# Patient Record
Sex: FEMALE | Race: WHITE | Employment: OTHER | URBAN - METROPOLITAN AREA
[De-identification: names, ages, dates, MRNs, and addresses within clinical notes are randomized per-mention and may not be internally consistent; named-entity substitution may affect disease eponyms.]

---

## 2018-02-26 ENCOUNTER — APPOINTMENT (OUTPATIENT)
Dept: CT IMAGING | Age: 73
DRG: 280 | End: 2018-02-26
Attending: FAMILY MEDICINE
Payer: MEDICARE

## 2018-02-26 ENCOUNTER — APPOINTMENT (OUTPATIENT)
Dept: NUCLEAR MEDICINE | Age: 73
DRG: 280 | End: 2018-02-26
Attending: EMERGENCY MEDICINE
Payer: MEDICARE

## 2018-02-26 ENCOUNTER — APPOINTMENT (OUTPATIENT)
Dept: CARDIAC CATH/INVASIVE PROCEDURES | Age: 73
DRG: 280 | End: 2018-02-26
Payer: MEDICARE

## 2018-02-26 ENCOUNTER — HOSPITAL ENCOUNTER (INPATIENT)
Age: 73
LOS: 3 days | Discharge: HOME | DRG: 280 | End: 2018-03-01
Attending: EMERGENCY MEDICINE | Admitting: FAMILY MEDICINE
Payer: MEDICARE

## 2018-02-26 ENCOUNTER — APPOINTMENT (OUTPATIENT)
Dept: GENERAL RADIOLOGY | Age: 73
DRG: 280 | End: 2018-02-26
Attending: EMERGENCY MEDICINE
Payer: MEDICARE

## 2018-02-26 ENCOUNTER — APPOINTMENT (OUTPATIENT)
Dept: CT IMAGING | Age: 73
DRG: 280 | End: 2018-02-26
Attending: EMERGENCY MEDICINE
Payer: MEDICARE

## 2018-02-26 DIAGNOSIS — I50.9 ACUTE CONGESTIVE HEART FAILURE, UNSPECIFIED CONGESTIVE HEART FAILURE TYPE: Primary | ICD-10-CM

## 2018-02-26 DIAGNOSIS — N28.9 RENAL INSUFFICIENCY: ICD-10-CM

## 2018-02-26 DIAGNOSIS — J96.01 ACUTE RESPIRATORY FAILURE WITH HYPOXIA (HCC): ICD-10-CM

## 2018-02-26 DIAGNOSIS — R77.8 ELEVATED TROPONIN: ICD-10-CM

## 2018-02-26 PROBLEM — R41.82 ALTERED MENTAL STATUS: Status: ACTIVE | Noted: 2018-02-26

## 2018-02-26 LAB
ALBUMIN SERPL-MCNC: 3.4 G/DL (ref 3.5–5)
ALBUMIN/GLOB SERPL: 0.7 {RATIO} (ref 1.1–2.2)
ALP SERPL-CCNC: 138 U/L (ref 45–117)
ALT SERPL-CCNC: 17 U/L (ref 12–78)
AMMONIA PLAS-SCNC: 20 UMOL/L
AMPHET UR QL SCN: NEGATIVE
ANION GAP BLD CALC-SCNC: 14 MMOL/L (ref 5–15)
ANION GAP SERPL CALC-SCNC: 12 MMOL/L (ref 5–15)
APAP SERPL-MCNC: <2 UG/ML (ref 10–30)
APTT PPP: 24.9 SEC (ref 22.1–32)
APTT PPP: 25.6 SEC (ref 22.1–32)
APTT PPP: 37.6 SEC (ref 22.1–32)
ARTERIAL PATENCY WRIST A: YES
AST SERPL-CCNC: 16 U/L (ref 15–37)
ATRIAL RATE: 102 BPM
BARBITURATES UR QL SCN: NEGATIVE
BASE DEFICIT BLD-SCNC: 6 MMOL/L
BASOPHILS # BLD: 0 K/UL (ref 0–0.1)
BASOPHILS # BLD: 0.1 K/UL (ref 0–0.1)
BASOPHILS NFR BLD: 0 % (ref 0–1)
BASOPHILS NFR BLD: 1 % (ref 0–1)
BDY SITE: ABNORMAL
BENZODIAZ UR QL: NEGATIVE
BILIRUB SERPL-MCNC: 0.3 MG/DL (ref 0.2–1)
BNP SERPL-MCNC: 1895 PG/ML (ref 0–125)
BUN BLD-MCNC: 21 MG/DL (ref 9–20)
BUN SERPL-MCNC: 20 MG/DL (ref 6–20)
BUN/CREAT SERPL: 13 (ref 12–20)
CA-I BLD-MCNC: 1.06 MMOL/L (ref 1.12–1.32)
CALCIUM SERPL-MCNC: 8.9 MG/DL (ref 8.5–10.1)
CALCULATED P AXIS, ECG09: 44 DEGREES
CALCULATED R AXIS, ECG10: -6 DEGREES
CALCULATED T AXIS, ECG11: 127 DEGREES
CANNABINOIDS UR QL SCN: NEGATIVE
CHLORIDE BLD-SCNC: 105 MMOL/L (ref 98–107)
CHLORIDE SERPL-SCNC: 104 MMOL/L (ref 97–108)
CHOLEST SERPL-MCNC: 286 MG/DL
CO2 BLD-SCNC: 25 MMOL/L (ref 21–32)
CO2 SERPL-SCNC: 22 MMOL/L (ref 21–32)
COCAINE UR QL SCN: NEGATIVE
COHGB MFR BLD: 1 % (ref 1–2)
CREAT BLD-MCNC: 1.2 MG/DL (ref 0.6–1.3)
CREAT SERPL-MCNC: 1.49 MG/DL (ref 0.55–1.02)
D DIMER PPP FEU-MCNC: 1.86 MG/L FEU (ref 0–0.65)
DIAGNOSIS, 93000: NORMAL
DIFFERENTIAL METHOD BLD: ABNORMAL
DIFFERENTIAL METHOD BLD: ABNORMAL
DRUG SCRN COMMENT,DRGCM: NORMAL
EOSINOPHIL # BLD: 0 K/UL (ref 0–0.4)
EOSINOPHIL # BLD: 0.3 K/UL (ref 0–0.4)
EOSINOPHIL NFR BLD: 0 % (ref 0–7)
EOSINOPHIL NFR BLD: 3 % (ref 0–7)
ERYTHROCYTE [DISTWIDTH] IN BLOOD BY AUTOMATED COUNT: 13.6 % (ref 11.5–14.5)
ERYTHROCYTE [DISTWIDTH] IN BLOOD BY AUTOMATED COUNT: 13.6 % (ref 11.5–14.5)
EST. AVERAGE GLUCOSE BLD GHB EST-MCNC: 137 MG/DL
GAS FLOW.O2 O2 DELIVERY SYS: ABNORMAL L/MIN
GLOBULIN SER CALC-MCNC: 4.8 G/DL (ref 2–4)
GLUCOSE BLD STRIP.AUTO-MCNC: 107 MG/DL (ref 65–100)
GLUCOSE BLD STRIP.AUTO-MCNC: 112 MG/DL (ref 65–100)
GLUCOSE BLD STRIP.AUTO-MCNC: 130 MG/DL (ref 65–100)
GLUCOSE BLD-MCNC: 263 MG/DL (ref 65–100)
GLUCOSE SERPL-MCNC: 263 MG/DL (ref 65–100)
HBA1C MFR BLD: 6.4 % (ref 4.2–6.3)
HCO3 BLD-SCNC: 20.1 MMOL/L (ref 22–26)
HCT VFR BLD AUTO: 36.5 % (ref 35–47)
HCT VFR BLD AUTO: 40.8 % (ref 35–47)
HCT VFR BLD CALC: 37 % (ref 35–47)
HDLC SERPL-MCNC: 76 MG/DL
HDLC SERPL: 3.8 {RATIO} (ref 0–5)
HGB BLD OXIMETRY-MCNC: 12.9 G/DL (ref 14–17)
HGB BLD-MCNC: 12.2 G/DL (ref 11.5–16)
HGB BLD-MCNC: 12.6 GM/DL (ref 11.5–16)
HGB BLD-MCNC: 13.1 G/DL (ref 11.5–16)
IMM GRANULOCYTES # BLD: 0 K/UL (ref 0–0.04)
IMM GRANULOCYTES # BLD: 0 K/UL (ref 0–0.04)
IMM GRANULOCYTES NFR BLD AUTO: 0 % (ref 0–0.5)
IMM GRANULOCYTES NFR BLD AUTO: 0 % (ref 0–0.5)
INR PPP: 1 (ref 0.9–1.1)
LACTATE SERPL-SCNC: 1.3 MMOL/L (ref 0.4–2)
LDLC SERPL CALC-MCNC: 185.4 MG/DL (ref 0–100)
LIPID PROFILE,FLP: ABNORMAL
LYMPHOCYTES # BLD: 1.8 K/UL (ref 0.8–3.5)
LYMPHOCYTES # BLD: 4.4 K/UL (ref 0.8–3.5)
LYMPHOCYTES NFR BLD: 17 % (ref 12–49)
LYMPHOCYTES NFR BLD: 43 % (ref 12–49)
MAGNESIUM SERPL-MCNC: 2 MG/DL (ref 1.6–2.4)
MCH RBC QN AUTO: 31.7 PG (ref 26–34)
MCH RBC QN AUTO: 31.9 PG (ref 26–34)
MCHC RBC AUTO-ENTMCNC: 32.1 G/DL (ref 30–36.5)
MCHC RBC AUTO-ENTMCNC: 33.4 G/DL (ref 30–36.5)
MCV RBC AUTO: 95.5 FL (ref 80–99)
MCV RBC AUTO: 98.8 FL (ref 80–99)
METHADONE UR QL: NEGATIVE
METHGB MFR BLD: 0.4 % (ref 0–1.4)
MONOCYTES # BLD: 0.5 K/UL (ref 0–1)
MONOCYTES # BLD: 0.6 K/UL (ref 0–1)
MONOCYTES NFR BLD: 5 % (ref 5–13)
MONOCYTES NFR BLD: 6 % (ref 5–13)
NEUTS SEG # BLD: 4.8 K/UL (ref 1.8–8)
NEUTS SEG # BLD: 7.9 K/UL (ref 1.8–8)
NEUTS SEG NFR BLD: 48 % (ref 32–75)
NEUTS SEG NFR BLD: 76 % (ref 32–75)
NRBC # BLD: 0 K/UL (ref 0–0.01)
NRBC # BLD: 0 K/UL (ref 0–0.01)
NRBC BLD-RTO: 0 PER 100 WBC
NRBC BLD-RTO: 0 PER 100 WBC
O2/TOTAL GAS SETTING VFR VENT: 40 %
OPIATES UR QL: NEGATIVE
OXYHGB MFR BLD: 81.2 % (ref 94–97)
P-R INTERVAL, ECG05: 152 MS
PCO2 BLD: 38.5 MMHG (ref 35–45)
PCP UR QL: NEGATIVE
PEEP RESPIRATORY: 6 CMH2O
PH BLD: 7.33 [PH] (ref 7.35–7.45)
PIP ISTAT,IPIP: 15
PLATELET # BLD AUTO: 317 K/UL (ref 150–400)
PLATELET # BLD AUTO: 332 K/UL (ref 150–400)
PMV BLD AUTO: 11 FL (ref 8.9–12.9)
PMV BLD AUTO: 11.1 FL (ref 8.9–12.9)
PO2 BLD: 65 MMHG (ref 80–100)
POTASSIUM BLD-SCNC: 4.5 MMOL/L (ref 3.5–5.1)
POTASSIUM SERPL-SCNC: 3.4 MMOL/L (ref 3.5–5.1)
PRESSURE SUPPORT SETTING VENT: 8 CMH2O
PROT SERPL-MCNC: 8.2 G/DL (ref 6.4–8.2)
PROTHROMBIN TIME: 10.4 SEC (ref 9–11.1)
Q-T INTERVAL, ECG07: 408 MS
QRS DURATION, ECG06: 156 MS
QTC CALCULATION (BEZET), ECG08: 531 MS
RBC # BLD AUTO: 3.82 M/UL (ref 3.8–5.2)
RBC # BLD AUTO: 4.13 M/UL (ref 3.8–5.2)
SALICYLATES SERPL-MCNC: <1.7 MG/DL (ref 2.8–20)
SAO2 % BLD: 82 % (ref 95–99)
SAO2 % BLD: 91 % (ref 92–97)
SERVICE CMNT-IMP: ABNORMAL
SODIUM BLD-SCNC: 139 MMOL/L (ref 136–145)
SODIUM SERPL-SCNC: 138 MMOL/L (ref 136–145)
SPECIMEN TYPE: ABNORMAL
SPONTANEOUS TIMED, IST: YES
T4 SERPL-MCNC: 9.8 UG/DL (ref 4.8–13.9)
THERAPEUTIC RANGE,PTTT: ABNORMAL SECS (ref 58–77)
THERAPEUTIC RANGE,PTTT: NORMAL SECS (ref 58–77)
THERAPEUTIC RANGE,PTTT: NORMAL SECS (ref 58–77)
TOTAL RESP. RATE, ITRR: 35
TRIGL SERPL-MCNC: 123 MG/DL (ref ?–150)
TROPONIN I SERPL-MCNC: 0.1 NG/ML
TROPONIN I SERPL-MCNC: 1.28 NG/ML
TROPONIN I SERPL-MCNC: 2.46 NG/ML
TSH SERPL DL<=0.05 MIU/L-ACNC: 5.36 UIU/ML (ref 0.36–3.74)
VENTRICULAR RATE, ECG03: 102 BPM
VLDLC SERPL CALC-MCNC: 24.6 MG/DL
WBC # BLD AUTO: 10.1 K/UL (ref 3.6–11)
WBC # BLD AUTO: 10.3 K/UL (ref 3.6–11)

## 2018-02-26 PROCEDURE — 74011250637 HC RX REV CODE- 250/637: Performed by: NURSE PRACTITIONER

## 2018-02-26 PROCEDURE — 82375 ASSAY CARBOXYHB QUANT: CPT | Performed by: FAMILY MEDICINE

## 2018-02-26 PROCEDURE — 85379 FIBRIN DEGRADATION QUANT: CPT | Performed by: EMERGENCY MEDICINE

## 2018-02-26 PROCEDURE — 94660 CPAP INITIATION&MGMT: CPT

## 2018-02-26 PROCEDURE — 74011250637 HC RX REV CODE- 250/637: Performed by: EMERGENCY MEDICINE

## 2018-02-26 PROCEDURE — 84443 ASSAY THYROID STIM HORMONE: CPT | Performed by: FAMILY MEDICINE

## 2018-02-26 PROCEDURE — 83735 ASSAY OF MAGNESIUM: CPT | Performed by: FAMILY MEDICINE

## 2018-02-26 PROCEDURE — 83605 ASSAY OF LACTIC ACID: CPT | Performed by: FAMILY MEDICINE

## 2018-02-26 PROCEDURE — 99283 EMERGENCY DEPT VISIT LOW MDM: CPT

## 2018-02-26 PROCEDURE — 74011250637 HC RX REV CODE- 250/637: Performed by: FAMILY MEDICINE

## 2018-02-26 PROCEDURE — 51702 INSERT TEMP BLADDER CATH: CPT

## 2018-02-26 PROCEDURE — 93970 EXTREMITY STUDY: CPT

## 2018-02-26 PROCEDURE — 93005 ELECTROCARDIOGRAM TRACING: CPT

## 2018-02-26 PROCEDURE — 80061 LIPID PANEL: CPT | Performed by: FAMILY MEDICINE

## 2018-02-26 PROCEDURE — 80307 DRUG TEST PRSMV CHEM ANLYZR: CPT | Performed by: FAMILY MEDICINE

## 2018-02-26 PROCEDURE — 74011000250 HC RX REV CODE- 250: Performed by: EMERGENCY MEDICINE

## 2018-02-26 PROCEDURE — 96375 TX/PRO/DX INJ NEW DRUG ADDON: CPT

## 2018-02-26 PROCEDURE — 80047 BASIC METABLC PNL IONIZED CA: CPT

## 2018-02-26 PROCEDURE — 71045 X-RAY EXAM CHEST 1 VIEW: CPT

## 2018-02-26 PROCEDURE — 80307 DRUG TEST PRSMV CHEM ANLYZR: CPT | Performed by: EMERGENCY MEDICINE

## 2018-02-26 PROCEDURE — 85025 COMPLETE CBC W/AUTO DIFF WBC: CPT | Performed by: EMERGENCY MEDICINE

## 2018-02-26 PROCEDURE — 96374 THER/PROPH/DIAG INJ IV PUSH: CPT

## 2018-02-26 PROCEDURE — 74011250636 HC RX REV CODE- 250/636: Performed by: INTERNAL MEDICINE

## 2018-02-26 PROCEDURE — 85730 THROMBOPLASTIN TIME PARTIAL: CPT | Performed by: NURSE PRACTITIONER

## 2018-02-26 PROCEDURE — 74011250636 HC RX REV CODE- 250/636: Performed by: NURSE PRACTITIONER

## 2018-02-26 PROCEDURE — 82962 GLUCOSE BLOOD TEST: CPT

## 2018-02-26 PROCEDURE — A9540 TC99M MAA: HCPCS

## 2018-02-26 PROCEDURE — 83880 ASSAY OF NATRIURETIC PEPTIDE: CPT | Performed by: EMERGENCY MEDICINE

## 2018-02-26 PROCEDURE — 70450 CT HEAD/BRAIN W/O DYE: CPT

## 2018-02-26 PROCEDURE — 82140 ASSAY OF AMMONIA: CPT | Performed by: FAMILY MEDICINE

## 2018-02-26 PROCEDURE — 74011250636 HC RX REV CODE- 250/636: Performed by: EMERGENCY MEDICINE

## 2018-02-26 PROCEDURE — 74011250636 HC RX REV CODE- 250/636: Performed by: SPECIALIST

## 2018-02-26 PROCEDURE — 80053 COMPREHEN METABOLIC PANEL: CPT | Performed by: EMERGENCY MEDICINE

## 2018-02-26 PROCEDURE — 85610 PROTHROMBIN TIME: CPT | Performed by: EMERGENCY MEDICINE

## 2018-02-26 PROCEDURE — 93306 TTE W/DOPPLER COMPLETE: CPT

## 2018-02-26 PROCEDURE — 65660000000 HC RM CCU STEPDOWN

## 2018-02-26 PROCEDURE — 77030005534 HC CATH URETH FOL TEMP SENS SIMS -B

## 2018-02-26 PROCEDURE — 84436 ASSAY OF TOTAL THYROXINE: CPT | Performed by: FAMILY MEDICINE

## 2018-02-26 PROCEDURE — 82803 BLOOD GASES ANY COMBINATION: CPT

## 2018-02-26 PROCEDURE — 77030013033 HC MSK BPAP/CPAP MMKA -B

## 2018-02-26 PROCEDURE — 36415 COLL VENOUS BLD VENIPUNCTURE: CPT | Performed by: NURSE PRACTITIONER

## 2018-02-26 PROCEDURE — 36600 WITHDRAWAL OF ARTERIAL BLOOD: CPT

## 2018-02-26 PROCEDURE — 84484 ASSAY OF TROPONIN QUANT: CPT | Performed by: EMERGENCY MEDICINE

## 2018-02-26 PROCEDURE — 85730 THROMBOPLASTIN TIME PARTIAL: CPT | Performed by: EMERGENCY MEDICINE

## 2018-02-26 PROCEDURE — 83036 HEMOGLOBIN GLYCOSYLATED A1C: CPT | Performed by: EMERGENCY MEDICINE

## 2018-02-26 RX ORDER — SODIUM CHLORIDE 0.9 % (FLUSH) 0.9 %
5-10 SYRINGE (ML) INJECTION AS NEEDED
Status: DISCONTINUED | OUTPATIENT
Start: 2018-02-26 | End: 2018-03-01 | Stop reason: HOSPADM

## 2018-02-26 RX ORDER — INSULIN LISPRO 100 [IU]/ML
INJECTION, SOLUTION INTRAVENOUS; SUBCUTANEOUS
Status: DISCONTINUED | OUTPATIENT
Start: 2018-02-26 | End: 2018-03-01 | Stop reason: HOSPADM

## 2018-02-26 RX ORDER — PRAVASTATIN SODIUM 20 MG/1
40 TABLET ORAL
Status: DISCONTINUED | OUTPATIENT
Start: 2018-02-26 | End: 2018-03-01 | Stop reason: HOSPADM

## 2018-02-26 RX ORDER — DEXTROSE 50 % IN WATER (D50W) INTRAVENOUS SYRINGE
12.5-25 AS NEEDED
Status: DISCONTINUED | OUTPATIENT
Start: 2018-02-26 | End: 2018-03-01 | Stop reason: HOSPADM

## 2018-02-26 RX ORDER — FENTANYL CITRATE 50 UG/ML
25-200 INJECTION, SOLUTION INTRAMUSCULAR; INTRAVENOUS
Status: DISCONTINUED | OUTPATIENT
Start: 2018-02-26 | End: 2018-02-26 | Stop reason: HOSPADM

## 2018-02-26 RX ORDER — SODIUM CHLORIDE 9 MG/ML
75 INJECTION, SOLUTION INTRAVENOUS CONTINUOUS
Status: DISCONTINUED | OUTPATIENT
Start: 2018-02-26 | End: 2018-02-26

## 2018-02-26 RX ORDER — MAGNESIUM SULFATE 100 %
4 CRYSTALS MISCELLANEOUS AS NEEDED
Status: DISCONTINUED | OUTPATIENT
Start: 2018-02-26 | End: 2018-03-01 | Stop reason: HOSPADM

## 2018-02-26 RX ORDER — METOPROLOL TARTRATE 25 MG/1
25 TABLET, FILM COATED ORAL EVERY 12 HOURS
Status: DISCONTINUED | OUTPATIENT
Start: 2018-02-26 | End: 2018-02-27

## 2018-02-26 RX ORDER — NITROGLYCERIN 0.4 MG/1
0.4 TABLET SUBLINGUAL
Status: DISCONTINUED | OUTPATIENT
Start: 2018-02-26 | End: 2018-02-26 | Stop reason: HOSPADM

## 2018-02-26 RX ORDER — SODIUM CHLORIDE 0.9 % (FLUSH) 0.9 %
5-10 SYRINGE (ML) INJECTION EVERY 8 HOURS
Status: DISCONTINUED | OUTPATIENT
Start: 2018-02-26 | End: 2018-03-01 | Stop reason: HOSPADM

## 2018-02-26 RX ORDER — POTASSIUM CHLORIDE 750 MG/1
40 TABLET, FILM COATED, EXTENDED RELEASE ORAL
Status: COMPLETED | OUTPATIENT
Start: 2018-02-26 | End: 2018-02-26

## 2018-02-26 RX ORDER — GUAIFENESIN 100 MG/5ML
162 LIQUID (ML) ORAL
Status: COMPLETED | OUTPATIENT
Start: 2018-02-26 | End: 2018-02-26

## 2018-02-26 RX ORDER — HEPARIN SODIUM 5000 [USP'U]/ML
4000 INJECTION, SOLUTION INTRAVENOUS; SUBCUTANEOUS ONCE
Status: COMPLETED | OUTPATIENT
Start: 2018-02-27 | End: 2018-02-27

## 2018-02-26 RX ORDER — SODIUM CHLORIDE 0.9 % (FLUSH) 0.9 %
10 SYRINGE (ML) INJECTION AS NEEDED
Status: DISCONTINUED | OUTPATIENT
Start: 2018-02-26 | End: 2018-02-26 | Stop reason: HOSPADM

## 2018-02-26 RX ORDER — FUROSEMIDE 10 MG/ML
40 INJECTION INTRAMUSCULAR; INTRAVENOUS
Status: COMPLETED | OUTPATIENT
Start: 2018-02-26 | End: 2018-02-26

## 2018-02-26 RX ORDER — HEPARIN SODIUM 10000 [USP'U]/100ML
11-25 INJECTION, SOLUTION INTRAVENOUS
Status: DISCONTINUED | OUTPATIENT
Start: 2018-02-26 | End: 2018-02-27

## 2018-02-26 RX ORDER — MIDAZOLAM HYDROCHLORIDE 1 MG/ML
.5-1 INJECTION, SOLUTION INTRAMUSCULAR; INTRAVENOUS
Status: DISCONTINUED | OUTPATIENT
Start: 2018-02-26 | End: 2018-02-26 | Stop reason: HOSPADM

## 2018-02-26 RX ORDER — HEPARIN SODIUM 200 [USP'U]/100ML
1000 INJECTION, SOLUTION INTRAVENOUS CONTINUOUS
Status: DISCONTINUED | OUTPATIENT
Start: 2018-02-26 | End: 2018-02-26

## 2018-02-26 RX ORDER — SODIUM CHLORIDE 9 MG/ML
1.5 INJECTION, SOLUTION INTRAVENOUS CONTINUOUS
Status: DISCONTINUED | OUTPATIENT
Start: 2018-02-26 | End: 2018-02-26

## 2018-02-26 RX ORDER — NITROGLYCERIN 20 MG/100ML
0-20 INJECTION INTRAVENOUS
Status: DISCONTINUED | OUTPATIENT
Start: 2018-02-26 | End: 2018-02-26

## 2018-02-26 RX ORDER — SODIUM CHLORIDE 9 MG/ML
3 INJECTION, SOLUTION INTRAVENOUS CONTINUOUS
Status: DISCONTINUED | OUTPATIENT
Start: 2018-02-26 | End: 2018-02-26

## 2018-02-26 RX ORDER — ASPIRIN 81 MG/1
81 TABLET ORAL DAILY
Status: DISCONTINUED | OUTPATIENT
Start: 2018-02-27 | End: 2018-03-01 | Stop reason: HOSPADM

## 2018-02-26 RX ORDER — FUROSEMIDE 10 MG/ML
40 INJECTION INTRAMUSCULAR; INTRAVENOUS 2 TIMES DAILY
Status: DISCONTINUED | OUTPATIENT
Start: 2018-02-26 | End: 2018-03-01

## 2018-02-26 RX ORDER — ATROPINE SULFATE 0.1 MG/ML
1 INJECTION INTRAVENOUS AS NEEDED
Status: DISCONTINUED | OUTPATIENT
Start: 2018-02-26 | End: 2018-02-26 | Stop reason: HOSPADM

## 2018-02-26 RX ORDER — LIDOCAINE HYDROCHLORIDE 10 MG/ML
10-30 INJECTION INFILTRATION; PERINEURAL
Status: DISCONTINUED | OUTPATIENT
Start: 2018-02-26 | End: 2018-02-26 | Stop reason: HOSPADM

## 2018-02-26 RX ORDER — HEPARIN SODIUM 1000 [USP'U]/ML
1000-5000 INJECTION, SOLUTION INTRAVENOUS; SUBCUTANEOUS
Status: DISCONTINUED | OUTPATIENT
Start: 2018-02-26 | End: 2018-02-26 | Stop reason: HOSPADM

## 2018-02-26 RX ADMIN — HEPARIN SODIUM AND DEXTROSE 11 UNITS/KG/HR: 10000; 5 INJECTION INTRAVENOUS at 14:18

## 2018-02-26 RX ADMIN — Medication 10 ML: at 07:22

## 2018-02-26 RX ADMIN — NITROGLYCERIN 10 MCG/MIN: 20 INJECTION INTRAVENOUS at 01:55

## 2018-02-26 RX ADMIN — PRAVASTATIN SODIUM 40 MG: 40 TABLET ORAL at 21:30

## 2018-02-26 RX ADMIN — FUROSEMIDE 40 MG: 10 INJECTION, SOLUTION INTRAMUSCULAR; INTRAVENOUS at 21:37

## 2018-02-26 RX ADMIN — METOPROLOL TARTRATE 25 MG: 25 TABLET ORAL at 10:14

## 2018-02-26 RX ADMIN — SODIUM CHLORIDE 75 ML/HR: 900 INJECTION, SOLUTION INTRAVENOUS at 10:11

## 2018-02-26 RX ADMIN — ASPIRIN 81 MG 162 MG: 81 TABLET ORAL at 07:21

## 2018-02-26 RX ADMIN — FUROSEMIDE 40 MG: 10 INJECTION, SOLUTION INTRAMUSCULAR; INTRAVENOUS at 14:28

## 2018-02-26 RX ADMIN — METOPROLOL TARTRATE 25 MG: 25 TABLET ORAL at 21:31

## 2018-02-26 RX ADMIN — FUROSEMIDE 40 MG: 10 INJECTION, SOLUTION INTRAMUSCULAR; INTRAVENOUS at 01:31

## 2018-02-26 RX ADMIN — POTASSIUM CHLORIDE 40 MEQ: 750 TABLET, EXTENDED RELEASE ORAL at 06:12

## 2018-02-26 RX ADMIN — SODIUM CHLORIDE 3 ML/KG/HR: 900 INJECTION, SOLUTION INTRAVENOUS at 09:55

## 2018-02-26 NOTE — CDMP QUERY
The medical record has dx. Of Altered Mental Status, Can you please clarify if this patient is (was) being treated/managed for:     =>  Acute Metabolic encephalopathy in the setting of Acute Resp. Failure, Metabolic Acidosis & NSTEMi, requiring Close monitoring, CT of head, Bipap, Labs, IV Nitro  => Other explanation of clinical findings  => Unable to determine (no explanation for clinical findings)    The medical record reflects the following clinical findings, treatment, and risk factors. Risk Factors:  NSTEMI, Resp. Failure, Metabolic Acidosis  Clinical Indicators:  Upon arrival to ER pt initally only responsive to stimulit, and gripping hands, tx with Bipap and later notes documents \"Pt much more alert at this time\"  Treatment: IV Nitro, BIpap, Ct head, Labs,     Please clarify and document your clinical opinion in the progress notes and discharge summary including the definitive and/or presumptive diagnosis, (suspected or probable), related to the above clinical findings. Please include clinical findings supporting your diagnosis.       Thank you,  Chip Nuha, BSN, RN, [address]  [phone]

## 2018-02-26 NOTE — PROGRESS NOTES
Stopped by to complete six minute walk with patient but patient was down in Cath Lab. Will check back later.

## 2018-02-26 NOTE — IP AVS SNAPSHOT
[address] [facility] [address] [place] [address] 
[phone] Patient: Te Fragoso MRN: [id number] WLA:[birth year]8 A check luke indicates which time of day the medication should be taken. My Medications START taking these medications Instructions Each Dose to Equal  
 Morning Noon Evening Bedtime  
 aspirin delayed-release 81 mg tablet Start taking on:  3/2/2018 Your last dose was: Your next dose is: Take 1 Tab by mouth daily. 81 mg  
    
   
   
   
  
 furosemide 40 mg tablet Commonly known as:  LASIX Your last dose was: Your next dose is: Take 1 Tab by mouth two (2) times a day. Indications: Pulmonary Edema due to Chronic Heart Failure 40 mg  
    
   
   
   
  
 losartan 25 mg tablet Commonly known as:  COZAAR Start taking on:  3/2/2018 Your last dose was: Your next dose is: Take 0.5 Tabs by mouth daily. 12.5 mg  
    
   
   
   
  
 metoprolol succinate 25 mg XL tablet Commonly known as:  TOPROL-XL Start taking on:  3/2/2018 Your last dose was: Your next dose is: Take 1 Tab by mouth daily for 30 days. 25 mg  
    
   
   
   
  
 potassium chloride SR 20 mEq tablet Commonly known as:  K-TAB Your last dose was: Your next dose is: Take 1 Tab by mouth daily. Indications: hypokalemia prevention 20 mEq  
    
   
   
   
  
 pravastatin 40 mg tablet Commonly known as:  PRAVACHOL Your last dose was: Your next dose is: Take 1 Tab by mouth nightly for 30 days. Indications: mixed hyperlipidemia 40 mg Where to Get Your Medications Information on where to get these meds will be given to you by the nurse or doctor. ! Ask your nurse or doctor about these medications  
  aspirin delayed-release 81 mg tablet furosemide 40 mg tablet  
 losartan 25 mg tablet  
 metoprolol succinate 25 mg XL tablet  
 potassium chloride SR 20 mEq tablet  
 pravastatin 40 mg tablet

## 2018-02-26 NOTE — PROGRESS NOTES
Hospitalist Progress Note  Ellie Oro NP  Answering service: [phone] from in house phone  Cell: [phone]      Date of Service:  2018  NAME:  Leonidas Raya  :    MRN:  [id number]      Admission Summary:   18-year-old white female with no significant past medical history presented to the Emergency Department via EMS from home with reported acute respiratory distress, altered mental status. Patient reports that she became acutely short of breath, which was severe, constant without specific alleviating factors. She notes that she has not seen a \"doctor\" in 9 years since the death of her . Troponin up to 2.46. Echo showing EF of 15-20%. Interval history / Subjective:     Patient resting in bed. Reports breathing is much better now. No chest pain. BLE edema improved. Cardiology unable to do cath today d/t volume overload.  Plan is to diuresis today and do cath tomorrow afternoon at 3:45 pm.      Assessment & Plan:     Acute Hypoxic respiratory failure   -requiring bipap on admission, currently on O2 via nasal cannula  -likely from volume overload.   -monitor o2 sat     Acute Systolic CHF NYHA class  -echo showing EF 15-20%, elevated BNP  -started on BB  -continue IV diuretic   -hold on ACE for now d/t GARCIA   -cardiology following   -strict I&O, HF educator    GARCIA   -unsure of baseline likely from uncontrolled HTN   -avoid nephrotoxic meds   -repeat labs in am     Uncontrolled HTN   -started on BB and diuretic   -monitor, may need further adjustments     NSTEMI   -troponin up to 2.46, EKG no acute changes  -cardiology following   -BB, heparin drip, statin  -cath tomorrow     Type 2 DM- new dx   -hgb a1c 6.4  -ssi, accuchecks   -DTC consult      Hypokalemia   -replenish  -monitor    Hypercholesterolemia   -CHol and LDL elevated   -started on statin    Elevated TSH   -tsh elevated, t4 normal  -will need labs repeats with a pcp in 4-6 weeks     Code status: Full  DVT prophylaxis: heparin    Care Plan discussed with: Patient/Family and Nurse  Disposition: Home w/Family and TBD     Hospital Problems  Date Reviewed: 2/26/2018          Codes Class Noted POA    Altered mental status ICD-10-CM: R41.82  ICD-9-CM: 780.97  2/26/2018 Unknown        Elevated troponin ICD-10-CM: R74.8  ICD-9-CM: 790.6  2/26/2018 Unknown        * (Principal)Acute respiratory failure with hypoxia [facility]) ICD-10-CM: J96.01  ICD-9-CM: 518.81  2/26/2018 Unknown                Review of Systems:   A comprehensive review of systems was negative except for that written in the HPI. Vital Signs:    Last 24hrs VS reviewed since prior progress note. Most recent are:  Visit Vitals    /80 (BP 1 Location: Left arm)    Pulse 75    Temp 98.5 °F (36.9 °C)    Resp 20    Wt 90.6 kg (199 lb 11.2 oz)    SpO2 96%    Breastfeeding No         Intake/Output Summary (Last 24 hours) at 02/26/18 1606  Last data filed at 02/26/18 1601   Gross per 24 hour   Intake                0 ml   Output             3925 ml   Net            -3925 ml        Physical Examination:             Constitutional:  No acute distress, cooperative, pleasant    ENT:  Oral mucous moist, oropharynx benign. Resp:  CTA bilaterally. No wheezing/rhonchi/rales. Crackles in BLL. No accessory muscle use. O2 via NC   CV:  Regular rhythm, normal rate, no murmurs, gallops, rubs    GI:  Soft, non distended, non tender. normoactive bowel sounds,      Musculoskeletal:  trace BLE edema, warm, 2+ pulses throughout    Neurologic:  Moves all extremities. AAOx3, CN II-XII reviewed     Psych:  Good insight, Not anxious nor agitated.    Lines: gomez catheter       Data Review:    Review and/or order of clinical lab test  Review and/or order of tests in the radiology section of CPT  Review and/or order of tests in the medicine section of CPT      Labs:     Recent Labs      02/26/18   1259  02/26/18   0127   WBC 10.3  10.1   HGB  12.2  13.1   HCT  36.5  40.8   PLT  317  332     Recent Labs      02/26/18   0613  02/26/18 0127   NA   --   138   K   --   3.4*   CL   --   104   CO2   --   22   BUN   --   20   CREA   --   1.49*   GLU   --   263*   CA   --   8.9   MG  2.0   --      Recent Labs      02/26/18 0127   SGOT  16   ALT  17   AP  138*   TBILI  0.3   TP  8.2   ALB  3.4*   GLOB  4.8*     Recent Labs      02/26/18   1259  02/26/18 0127   INR   --   1.0   PTP   --   10.4   APTT  25.6  24.9      No results for input(s): FE, TIBC, PSAT, FERR in the last 72 hours. No results found for: FOL, RBCF   No results for input(s): PH, PCO2, PO2 in the last 72 hours.   Recent Labs      02/26/18   1259  02/26/18 0613 02/26/18 0127   TROIQ  2.46*  1.28*  0.10*     Lab Results   Component Value Date/Time    Cholesterol, total 286 (H) 02/26/2018 01:27 AM    HDL Cholesterol 76 02/26/2018 01:27 AM    LDL, calculated 185.4 (H) 02/26/2018 01:27 AM    Triglyceride 123 02/26/2018 01:27 AM    CHOL/HDL Ratio 3.8 02/26/2018 01:27 AM     Lab Results   Component Value Date/Time    Glucose (POC) 112 (H) 02/26/2018 07:25 AM    Glucose (POC) 263 (H) 02/26/2018 01:50 AM     No results found for: COLOR, APPRN, SPGRU, REFSG, KAZ, PROTU, GLUCU, KETU, BILU, UROU, ROMEL, LEUKU, GLUKE, EPSU, BACTU, WBCU, RBCU, CASTS, UCRY      Medications Reviewed:     Current Facility-Administered Medications   Medication Dose Route Frequency    sodium chloride (NS) flush 5-10 mL  5-10 mL IntraVENous Q8H    sodium chloride (NS) flush 5-10 mL  5-10 mL IntraVENous PRN    glucose chewable tablet 16 g  4 Tab Oral PRN    dextrose (D50W) injection syrg 12.5-25 g  12.5-25 g IntraVENous PRN    glucagon (GLUCAGEN) injection 1 mg  1 mg IntraMUSCular PRN    insulin lispro (HUMALOG) injection   SubCUTAneous AC&HS    pravastatin (PRAVACHOL) tablet 40 mg  40 mg Oral QHS    [START ON 2/27/2018] aspirin delayed-release tablet 81 mg  81 mg Oral DAILY    metoprolol tartrate (LOPRESSOR) tablet 25 mg  25 mg Oral Q12H    furosemide (LASIX) injection 40 mg  40 mg IntraVENous BID    heparin 25,000 units in D5W 250 ml infusion  11-25 Units/kg/hr IntraVENous TITRATE     ______________________________________________________________________  EXPECTED LENGTH OF STAY: - - -  ACTUAL LENGTH OF STAY:          0                 Nieves Mcgowan NP

## 2018-02-26 NOTE — PROCEDURES
[facility]  *** FINAL REPORT ***    Name: Yue Teran  MRN: [id number]    Inpatient  : 16 Aug 1945  HIS Order #: [id number]  [id number] [facility] Visit #: [id number]  Date: 2018    TYPE OF TEST: Peripheral Venous Testing    REASON FOR TEST  Edema    Right Leg:-  Deep venous thrombosis:           No  Superficial venous thrombosis:    No  Deep venous insufficiency:        Not examined  Superficial venous insufficiency: Not examined    Left Leg:-  Deep venous thrombosis:           No  Superficial venous thrombosis:    No  Deep venous insufficiency:        Not examined  Superficial venous insufficiency: Not examined      INTERPRETATION/FINDINGS  PROCEDURE:  Color duplex ultrasound imaging of lower extremity veins. FINDINGS:       Right: The common femoral, deep femoral, femoral, popliteal,  posterior tibial, peroneal, and great saphenous are patent and without   evidence of thrombus;  each is fully compressible and there is no  narrowing of the flow channel on color Doppler imaging. Phasic flow  is observed in the common femoral vein. Left:   The common femoral, deep femoral, femoral, popliteal,  posterior tibial, peroneal, and great saphenous are patent and without   evidence of thrombus;  each is fully compressible and there is no  narrowing of the flow channel on color Doppler imaging. Phasic flow  is observed in the common femoral vein. IMPRESSION:  No evidence of right or left lower extremity vein  thrombosis. ADDITIONAL COMMENTS    I have personally reviewed the data relevant to the interpretation of  this  study. TECHNOLOGIST: Yeni Reradon.  Landry Roca  Signed: 2018 09:57 AM    PHYSICIAN: Damion Purcell MD  Signed: 2018 11:10 AM

## 2018-02-26 NOTE — H&P
[address] [facility]  ACUTE CARE HISTORY AND PHYSICAL    Maria Guadalupe Thayer  MR#: [id number]  : 1945  ACCOUNT #: [de-identified]   DATE OF SERVICE: 2018    CHIEF COMPLAINT:  Shortness of breath. HISTORY OF PRESENT ILLNESS:  This 81-year-old white female with no significant past medical history presented to the Emergency Department via EMS from home with reported acute respiratory distress, altered mental status. Patient reports that she became acutely short of breath, which was severe, constant without specific alleviating factors. She reportedly was able to apparently call EMS who responded to the scene noted the patient had called as she was gasping for air. She reportedly was ambu bag ventilated at the scene, administered oxygen. She reportedly was minimally responsive upon EMS arrival.  She was reportedly given 2 doses of nitroglycerin. There was reportedly concern for fluid overload. She remained short of breath. On arrival in the Emergency Department, initial recorded vital signs were blood pressure 184/116, heart rate of 104, respiratory rate of 32, O2 saturation 94%. She was placed on BiPAP. Patient underwent a workup including chest x-ray portable showing mild cardiomegaly and pulmonary edema. A 12-lead EKG shows sinus tachycardia, left bundle branch block and 104 beats a minute. Patient had an elevated proBNP at [address]. Troponin 0.10. Blood glucose equaled 263. The patient has no known history of diabetes mellitus. She notes that she has not seen a \"doctor\" in 9 years since the death of her . Gomez catheter had been placed per the ED and the patient had started diuresis. Patient is now seen for admission to the hospitalist service and continued evaluation and treatments.   She does not report any recent falls, trauma or injury, headache, neck pain, back pain, chest pain, palpitations, abdominal pain, nausea, vomiting, diarrhea, melena, dysuria, hematuria, calf pain, swelling, edema, fever, chills, rash, other constitutional symptoms except for those mentioned. She does not report having any recent respiratory symptoms prior to onset of shortness of breath tonight. She does not report any sick contacts. She is not reporting any recent prolonged travel, immobilization or surgeries. She, however, has reported exposure to an open gas burning heater in her home. PAST MEDICAL HISTORY:  None reported. PAST SURGICAL HISTORY:  None reported. MEDICATIONS:  None. ALLERGIES:  NO KNOWN DRUG ALLERGIES. SOCIAL HISTORY:  She denies smoking, alcohol or illicit drugs. . FAMILY HISTORY:  Myocardial infarction in father, paternal uncles. CHF in mother . REVIEW OF SYSTEMS:  All systems reviewed. Pertinent positives were as in HPI, otherwise negative. PHYSICAL EXAMINATION:  VITAL SIGNS:  Temperature 98.6 degrees Fahrenheit, blood pressure 151/83, heart rate of 97, respiratory rate of 17, O2 saturation 94% on 2 liters O2 nasal cannula, recorded weight 199 pounds (90.6 kg). GENERAL:  Overweight female in no acute respiratory distress. PSYCHIATRIC:  Patient is awake, alert and oriented x3. NEUROLOGIC:  GCS of 15. Moves extremities x4. Sensation grossly intact without slurred speech, facial droop. No pronator drift. HEENT:  Normocephalic, atraumatic. PERRLA is intact. Sclerae anicteric, conjunctivae not injected. Nares are patent, throat clear. Tongue is midline, nonedematous. NECK:  Supple, without lymphadenopathy, JVD, carotid bruits, thyromegaly. LYMPH:  Negative cervical or supraclavicular adenopathy. LUNGS:  Bilateral rales. HEART:  Regular rate and rhythm, normal S1, S2, without murmurs, rubs or gallops. ABDOMEN:  Soft, nontender, nondistended. Normoactive bowel sounds. No rebound, guarding, rigidity. No auscultated abdominal bruits. No pulsatile mass. BACK:  No CVA tenderness.   No step-off  MUSCULOSKELETAL:  No acute palpable bony deformity. Negative for calf tenderness. VASCULAR:  2+ radial, 1+ dorsalis pedis pulses without cyanosis, clubbing. Nonpitting edema bilateral lower extremities. SKIN:  Warm and dry. LABORATORIES:  I reviewed as follows:  Sodium 134, potassium 3.4, chloride 104, CO2 of 22, BUN of 20, creatinine 1.49, glucose 263, anion gap of 12, calcium is 8.9, GFR 34, total bilirubin 0.3, total protein 8.2, albumin 3.4, ALT 17, AST is 16, alkaline phosphatase 138, troponin I 0.10. ProBNP 1895. WBC 10.1, hemoglobin 13.1, hematocrit of 40.8, platelets 585, neutrophils of 48%. INR 1.0, PT of 10.4, PTT 24.9. D-dimer 1.86. ABG:  pH 7.326, pCO2 38.5, pO2 of 65, bicarbonate 20.1, base deficit -6, SaO2 of 91% on BiPAP, FiO2 of 40%, IPAP 15, EPAP 6. D-dimer 1.86. Chest x-ray, portable, mild cardiomegaly and pulmonary edema. 12- lead EKG:  Sinus tachycardia, left bundle branch block, 104 beats a minute. IMPRESSION AND PLAN:  1. Acute hypoxic respiratory failure. Admit patient to telemetry. The patient was on BiPAP. ED has already transitioned the patient to O2 via nasal cannula. She is currently oxygenating borderline at 94% on the same. Will be able to titrate O2 to keep a goal greater than 94%. Monitor closely with continuous pulse oximetry. 2.  Acute CHF -- unspecified. Awaiting results of the 2D echocardiogram, which will be performed today. Consult with cardiology, placed on strict I's and O's, daily weights. Continue on Lasix for diuresis. 3.  Elevated troponin. Currently, has no chest pain. Repeat serial troponin levels q.6 hours. Continue telemetry monitoring. Check 2D echocardiogram.  4.  Type 2 diabetes mellitus with hypoglycemia -- new onset. We will order hemoglobin A1c level. Place on Humalog insulin correction coverage, scheduled Accu-Cheks. 5.  Elevated creatinine. Uncertain of her baseline renal function.   Patient reportedly has not had any recent evaluation by physician/medical provider. We will continue with workup as noted above. 6.  Acute hypokalemia. Provide potassium replacement. Repeat potassium levels post-replacement. 7.  Lower extremity edema. Order venous duplex of bilateral lower extremities. 8.  Metabolic acidosis as noted from ABG which showed uncompensated primary metabolic acidosis. Have to monitor closely. The patient is currently in acute CHF. No aggressive IV fluids have been administered. 9.  Hypertensive urgency. The patient has been started on nitroglycerin IV infusion per the ED. May continue on the same. 10.  Tachycardia, currently resolved. 11.  Left bundle branch block. No other comparison EKG is available for review. Consult with cardiologist.  12.  Altered mental status. The etiology in this setting was unknown. However, I placed orders for additional work. I would also like to check for carbon monoxide level. She has an open heater in her home. Continue workup for the same. 13.  VT prophylaxis. SCDs to lower extremities. MD PIPO Rivera / Lulu Bravo  D: 02/26/2018 04:55     T: 02/26/2018 05:50  JOB #: [id number]

## 2018-02-26 NOTE — NURSE NAVIGATOR
Chart reviewed by Heart Failure Nurse Navigator. Heart Failure database completed. EF 15/20%. ACEi/ARB: will need documentation of contraindication or specific reason no ACE/ARB prescribed. BB: will need documentation of contraindication or specific reason no beta blocker prescribed. Ángel Lang CRT Not indicated  NYHA Functional Class Requested via provider message on [employer]. Heart Failure Teach Back in Patient Education. Heart Failure Avoiding Triggers on Discharge Instructions. Outpatient nurse navigator notified of admission.

## 2018-02-26 NOTE — ED TRIAGE NOTES
TRIAGE NOTE: Pt arrives via EMS from home. Pt called 911 for resp distress that began tonight. Pt found agonal by ems, arrives with BVM to full o2. Pt hypertensive, received 2 NTG via EMS. MD and respiratory at bedside.

## 2018-02-26 NOTE — CARDIO/PULMONARY
Cardiac Rehab: MI education folder, with catheterization brochure, to bedside of Diana Levi. Met with the pt. and her sister to provide education. Cardiac Cath was cancelled for today. Pt. is aware of her weak heart muscle and HF diagnosis but did not provide HF education at this encounter. Diana Levi has not seen a physician in 8 years. Educated using teach back method. Reviewed MI diagnosis definition and purpose of intervention. Discussed risk factors for CAD to include the following: family history, elevated BMI, hyperlipidemia, hypertension, diabetes, stress, and smoking. Discussed Heart Healthy/Low Sodium (2000 mg) diet. Reviewed the importance of medication compliance, the purpose of metoprolol, and potential side effects. Discussed follow up appointments with cardiologist,  and what to report to physician after discharge. Emphasized the value of cardiac rehab. Discussed Cardiac Rehab Program format, benefits, and encouraged enrollment to assist with risk modification and management. Will continue to follow and educate. Diana Levi verbalized understanding with questions answered.   Joseph Biggs RN

## 2018-02-26 NOTE — DIABETES MGMT
DTC Consult Note    Recommendations/ Comments: Reviewed pre-diabetes numbers and suggested lifestyle changes. Encouraged patient to follow up with PCP in 3 months for repeat A1C. Current hospital DM medication: Lispro correction, normal scale    Consult received for:       [x]             New diagnosis    Chart reviewed and initial evaluation complete on Caro Grace. Patient is a 71 y.o. female with newly diagnosed pre-diabetes. Assessed and instructed patient on the following:   ·  interpretation of lab results, blood sugar goals, complications of diabetes mellitus and nutrition    Encouraged the following:   · increased exercise once allowed, dietary modifications: eliminate juices    Provided patient with the following: [x]             Pre-Diabetes education materials               [x]             Outpatient DTC contact number    Discussed with patient and/or family need for follow up appointment for diabetes management after discharge. A1c:   Lab Results   Component Value Date/Time    Hemoglobin A1c 6.4 (H) 02/26/2018 01:27 AM       Recent Glucose Results:   Lab Results   Component Value Date/Time     (H) 02/26/2018 01:27 AM    GLUCPOC 112 (H) 02/26/2018 07:25 AM    GLUCPOC 263 (H) 02/26/2018 01:50 AM        Lab Results   Component Value Date/Time    Creatinine 1.49 (H) 02/26/2018 01:27 AM     CrCl cannot be calculated (Unknown ideal weight. ). Active Orders   Diet    DIET CARDIAC Regular; 2 GM NA (House Low NA); Consistent Carb 1800kcal    DIET NPO Except Meds        PO intake: No data found. Will continue to follow as needed. Thank you.   Alysha Katz, MS, RN, CDE

## 2018-02-26 NOTE — PROGRESS NOTES
Clinical Care Lead Arrival Summary        Name: Te Fragoso  MRN: [id number]  Date: 2018 7:00 AM  Admission Date: 2018  : 1945  Situation:  18 ER ADMIT FOR CHF/SOB    Background:   NOT SEEN BY DOCTOR FOR 9 YEARS      Altered mental status ICD-10-CM: R41.82  ICD-9-CM: 780.97   2018 - Present Unknown         Entered by Mike Quinteros MD       Elevated troponin ICD-10-CM: R74.8  ICD-9-CM: 790.6   2018 - Present Unknown        Entered by Mike Quinteros MD       Acute respiratory failure with hypoxia ([facility] [address] ICD-10-CM: J96.01  ICD-9-CM: 518.81            The Beta blocker the patient is taking is [unfilled], NONE          STAND: ON ADMISSION  Voice quality/secretions:    Hx of dysphagia:    O2 sat:    Alertness:      Flu vaccination received for current season: SCREEN       VTE Documentation-MECHANICAL  VTE Risk Assessment    Mechanical VTE orders:    Venous Foot Pump:    Graduated Compression Stockings:    Sequential Compression Devices:    Patient Refused VTE:        Diet: DIET NPO              Assessment:    Lines/Drains/Airways:   Peripheral IV 18 Left Arm (Active)       Peripheral IV 18 Right Antecubital (Active)       Urinary Catheter 18 Gomez - Temperature (Active)   Urine Output (mL) 1600 ml 2018  6:19 AM       Last 3 Weights:  Last 3 Recorded Weights in this Encounter    18 0201   Weight: 90.6 kg (199 lb 11.2 oz)     STANDING DAILY WEIGHT WHILE ON TELEMETRY    Recommendations: HEART FAILURE PATHWAY  Consult Orders: )IP CONSULT TO HOSPITALIST  IP CONSULT TO CARDIOLOGY  Recent orders:  XR CHEST PORT  NM LUNG VENT/PERF  CT HEAD WO CONT  DUPLEX LOWER EXT VENOUS BILAT  MEASURE RECTAL TEMPERATURE  NURSING-MISCELLANEOUS:  NURSING-MISCELLANEOUS:  NURSING-MISCELLANEOUS:  NURSING-MISCELLANEOUS:  NURSING-MISCELLANEOUS:  NURSING-MISCELLANEOUS:  NURSING-MISCELLANEOUS:  [address] ASSESSMENT  NOTIFY PROVIDER:  STATUS  NOTIFY PROVIDER: VITAL SIGNS CHANGES  INTAKE AND OUTPUT  BLADDER CHECKS  APPLY/MAINTAIN SEQUENTIAL COMPRESSION DEVICE  IP CONSULT TO HOSPITALIST  INITIAL PHYSICIAN ORDER: INPATIENT  IP CONSULT TO DIABETES MANAGEMENT  TRANSFER PATIENT  IP CONSULT TO CARDIOLOGY  SALINE LOCK IV  FULL CODE  DIET NPO    Core Measures Compliant   CHF:YES   Pneumonia:UNKNOWN   Vaccines:SCREEN   SCIP:NA-MEDICAL PATIENT   Gomez:NO ORDER  [facility]  AMI:UNKNOWN

## 2018-02-26 NOTE — ED NOTES
0345: Assumed care of patient from \[facility]\"". Patient resting comfortably in bed. Transitioned to NC by prior RN as patient has had fair amount of diuresis at this time. Patient very alert, with even respirations, sats 95-97%. Will transport patient to nuclear medicine on telemetry & 02 via NC. Dr. Clark Hand at bedside evaluating. 8444: Patient in nuc med with primary RN on NC 2L and monitor. Tolerating well.    0500: Patient returned from nuclear medicine, transport uneventful. Denies needs. VSS.    0600: Patient and present family updated on plan of care and treatment, verbalized understanding. No questions or needs expressed at this time. 0700: Dr. Carol Rocha notified of repeat critical troponin, cardiology consult paged. Patient with no CP at this time or during this visit.     Galen Matos: Report attempted. 2806: Report attempted. 1142: Patient left department for transportation to inpatient bed. Patient's VS at the time of transfer were /66, 95% on 2L, denies pain at this time, 98.6 orally, HR 85 sinus rhythm on the monitor. Patient was alert and oriented x 4 and denies further needs at time of transfer. TRANSFER - OUT REPORT:    Verbal report given to [facility] RN(name) on Reji Los  being transferred to [facility](unit) for routine progression of care       Report consisted of patients Situation, Background, Assessment and   Recommendations(SBAR). Information from the following report(s) SBAR, Kardex, ED Summary, [place] and Recent Results was reviewed with the receiving nurse. Opportunity for questions and clarification was provided.

## 2018-02-26 NOTE — PROGRESS NOTES
TRANSFER - IN REPORT:    Verbal report received from Kourtney(name) on Evita Holder  being received from Cath Lab(unit) for routine progression of care      Report consisted of patients Situation, Background, Assessment and   Recommendations(SBAR). Information from the following report(s) SBAR was reviewed with the receiving nurse. Opportunity for questions and clarification was provided. Assessment completed upon patients arrival to unit and care assumed.

## 2018-02-26 NOTE — CONSULTS
CARDIOLOGY CONSULT NOTE     [facility] [address], [address] [address], [facility]   [phone] fax [phone]    Name: Sandra Jan [birth year] [id number]  2/26/2018 7:41 AM     Assessment/Plan:      1. Acute respiratory failure - CXR with cardiomegaly and mild pulmonary edema, pro-BNP 1895, given Lasix 40mg IV once on arrival, off BiPAP now, D-dimer 8.6 but VQ scan with low probability for PE, troponin trending up, see treatment for NSTEMI as below, stat TTE ordered   2. NSTEMI - initial troponin 0.10 and now troponin 1.28, will repeat troponin at 9am, 12 lead ECG with NSR with LBBB, no previous ECG available for review, NPO for cardiac cath at 1045am today with Dr. Cait Kumari  -Discussed risks and benefits of cardiac cath +/- PCI and patient agrees to proceed  -Discussed risk is 1 in 100 for bleeding, prolonged hospital stay, groin complications, infection, tear in cardiac vessel, tamponade or deterioration in kidney function for patients with baseline kidney dysfunction prior to procedure  -Discussed risk is 1 in 1,000 of heart attack, stroke or death  -continue ASA 81mg daily (received ASA 162mg on arrival), will begin metoprolol 25mg BID and pravastatin 40mg at bedtime, no heparin/Lovenox due to impending cardiac cath  -off IV NTG infusion since 2am  -stat TTE ordered  3. Dyslipidemia - , , , HDL 76, will begin pravastatin 40mg at bedtime, will need fasting lipids and CMP checked in 6-8 weeks, taking Fish Oil PTA  4. HTN - /116 on arrival, initially on IV NTG infusion but that has been off since 2am, BP better controlled now, starting metoprolol 25mg BID as above  5. DM Type 2 - Hgb A1C 6.4%, management per IM  6. Elevated TSH 5.3 - will check T4 now  7. LBBB - initial ECG with LBBB, no previous ECG available for comparison, will repeat ECG now  8. Hypokalemia - K 3.4, got KCL 40meq PO once, recheck in AM  9.   GARCIA - creatinine 1.4, unsure of baseline, may have chronic renal insufficiency    Fam Hx: + early CAD, father passed from MI at age 62, mother passed at age 80 after MI with CHF, two brothers with CABG (age unknown)  Soc Hx: no tobacco use, no etoh use    Admit Date: 2/26/2018     Admit Diagnosis: Altered mental status;Acute respiratory failure with hypoxi*  [address], MD     Attending Provider: Ajay Chiu MD  Primary Cardiologist: None  Consulting Cardiologist: Dr. Gautam Boot: elevated troponin  Requesting Physician: Dr. Po Mercado    Subjective: Diana Levi is a 71 y.o. female admitted for acute respiratory failure. She presented from home to Emergency Department via EMS for acute respiratory distress and altered mental status. Patient reports that she became acutely short of breath while lying down in bed reading. Dyspnea was severe, constant without specific alleviating factors. She called EMS and when they arrived on scene she was gasping for air. She reportedly was ambu bag ventilated at the scene, administered oxygen. She was minimally responsive and during transit was given NTG SL tabs x 2. She denied any chest pain. On arrival in the Emergency Department, her blood pressure was 184/116 and ECG showed sinus tachycardia with LBBB. No ECG available for comparison. She was initially placed on BiPAP but is now on 2 lpm via nasal cannuula. CXR showed mild cardiomegaly and pulmonary edema and her proBNP was 1895. Troponin initially 0.10 but now 1.28. She was given ASA 162mg, Lasix 40mg IV and KCL 40meq PO. Patient had not seen MD in 9 years and aware of any past medical history. She reported having a brief episode of dyspnea at rest while lying in bed reading two days ago that was less severe. She denies any recent chest pain, palpitations, dizziness, syncope, LE edema. Denies episodes of PND or orthopnea.   She had a stress test about 9 years ago but no medical care since that time. Review of Symptoms:  A comprehensive review of systems was negative except for that written in the HPI. Previous treatment/evaluation includes none . Cardiac risk factors: family history of early CAD present on admission, patient not seen by MD in over 9 years     No past medical history on file. No past surgical history on file. Current Facility-Administered Medications   Medication Dose Route Frequency    sodium chloride (NS) flush 5-10 mL  5-10 mL IntraVENous Q8H    sodium chloride (NS) flush 5-10 mL  5-10 mL IntraVENous PRN    glucose chewable tablet 16 g  4 Tab Oral PRN    dextrose (D50W) injection syrg 12.5-25 g  12.5-25 g IntraVENous PRN    glucagon (GLUCAGEN) injection 1 mg  1 mg IntraMUSCular PRN    insulin lispro (HUMALOG) injection   SubCUTAneous AC&HS    pravastatin (PRAVACHOL) tablet 40 mg  40 mg Oral QHS    [START ON 2/27/2018] aspirin delayed-release tablet 81 mg  81 mg Oral DAILY    metoprolol tartrate (LOPRESSOR) tablet 25 mg  25 mg Oral Q12H    furosemide (LASIX) injection 40 mg  40 mg IntraVENous BID    heparin 25,000 units in D5W 250 ml infusion  11-25 Units/kg/hr IntraVENous TITRATE       No Known Allergies   No family history on file.    Social History     Social History    Marital status: SINGLE     Spouse name: N/A    Number of children: N/A    Years of education: N/A     Social History Main Topics    Smoking status: Not on file    Smokeless tobacco: Not on file    Alcohol use Not on file    Drug use: Not on file    Sexual activity: Not on file     Other Topics Concern    Not on file     Social History Narrative          Objective:      Physical Exam  Vitals:    02/26/18 0600 02/26/18 0700 02/26/18 0815 02/26/18 0957   BP: 146/67 147/66  170/86   Pulse: 86 85 85 89   Resp: 20 22 20 23   Temp: 98.6 °F (37 °C) 98.6 °F (37 °C) 97.8 °F (36.6 °C) 98.7 °F (37.1 °C)   SpO2: 93% 95% 96% 100%   Weight:           General:  Alert, cooperative, no distress, appears stated age. Eyes:  Conjunctivae/corneas clear. Ears:  Normal external ear canals both ears. Nose: Nares normal.    Mouth/Throat: Moist mucous membranes. Neck: Supple, symmetrical, trachea midline, no carotid bruit and no JVD. Back:   Symmetric, no curvature. ROM normal.    Lungs:   Crackles in bases bilaterally. Heart:  Tachycardic, regular rhythm, S1, S2 normal, no murmur, click, rub or gallop. Abdomen:   Soft, non-tender. Bowel sounds normal. No masses,  No organomegaly. Extremities: Extremities normal, atraumatic, no cyanosis or edema. Vascular: 2+ and symmetric all extremities. Skin: Skin color normal. No rashes or lesions   Lymph nodes: Not assessed   Neurologic: CNII-XII intact. Normal strength throughout. Telemetry: sinus tachycardia with LBBB  ECG: sinus tachycardia VR 104bpm, LBBB    Data Review:     Recent Labs      02/26/18   1259  02/26/18   0613  02/26/18   0127   TROIQ  2.46*  1.28*  0.10*     Recent Labs      02/26/18   0127   NA  138   K  3.4*   CL  104   CO2  22   BUN  20   CREA  1.49*   GLU  263*   CA  8.9     Recent Labs      02/26/18   1259  02/26/18   0127   WBC  10.3  10.1   HGB  12.2  13.1   HCT  36.5  40.8   PLT  317  332     Recent Labs      02/26/18   0127   PTP  10.4   INR  1.0   SGOT  16   AP  138*     Recent Labs      02/26/18   0127   CHOL  286*   LDLC  185.4*     Recent Labs      02/26/18   0127   TSH  5.36*     Thank you very much for this referral. I appreciate the opportunity to participate in this patient's care. I will follow along with above stated plan.     Diya Capellan MD  [facility] [address] [address], [address] [facility] [address]  [facility]  [phone]    Gasper Emerson MD

## 2018-02-26 NOTE — ED PROVIDER NOTES
HPI Comments: 71 y.o. Female who presents from home via EMS with chief complaint of respiratory distress. Per EMS, pt called \"gasping for air\". They were dispatched based on that symptom. On arrival, pt found in severe respiratory distress and minimally responsive. They began bagging the patient \"because she was full of fluid\". En route, she began responding via hand . She was given two doses of Nitro. While in ED, pt states that she is short of breath. There are no other acute medical concerns at this time. Full history, PMHx, physical exam, and ROS unable to be obtained due to:  Severe respiratory distress. PCP: No primary care provider on file. Note written by Silvana Turner, as dictated by Macy Bravo MD 1:30 AM    The history is provided by the EMS personnel. No  was used. No past medical history on file. No past surgical history on file. No family history on file. Social History     Social History    Marital status: N/A     Spouse name: N/A    Number of children: N/A    Years of education: N/A     Occupational History    Not on file. Social History Main Topics    Smoking status: Not on file    Smokeless tobacco: Not on file    Alcohol use Not on file    Drug use: Not on file    Sexual activity: Not on file     Other Topics Concern    Not on file     Social History Narrative         ALLERGIES: Review of patient's allergies indicates no known allergies. Review of Systems   Unable to perform ROS: Severe respiratory distress       Vitals:    02/26/18 0126   BP: (!) 184/116   Pulse: (!) 104   Resp: (!) 32   SpO2: 94%            Physical Exam   Constitutional: She appears well-developed and well-nourished. HENT:   Head: Normocephalic and atraumatic. Mouth/Throat: Oropharynx is clear and moist.   Eyes: Conjunctivae are normal.   Neck: Neck supple. Cardiovascular: Normal rate, regular rhythm and normal heart sounds. Pulmonary/Chest: Effort normal. She has rales in the right upper field, the right middle field, the left upper field, the left middle field and the left lower field. Abdominal: Soft. Bowel sounds are normal. There is no tenderness. Musculoskeletal: She exhibits no edema or tenderness. Neurological:   Responsive to stimuli   hands   Skin: Skin is warm and dry. Nursing note and vitals reviewed. Note written by Silvana Suarez, as dictated by Kimberli Koroma MD 1:38 AM    MDM  Number of Diagnoses or Management Options  Critical Care  Total time providing critical care: 30-74 minutes (40 minutes)        ED Course       Procedures    PROGRESS NOTE:  2:31 AM  Pt much more alert at this time. She denies any chest. No gross deficits on Neuro exam.    CONSULT NOTE:  2:38 AM Kimberli Koroma MD spoke with Dr. Abe Abraham, Consult for Cardiology. Discussed available diagnostic tests and clinical findings. Dr. Abe Abraham recommends obtaining a VQ scan. ED EKG interpretation:  Rhythm: sinus tachycardia; and regular . Rate (approx.): 102; Axis: normal; P wave: normal; QRS interval: LBBB; ST/T wave: non-specific changes; This EKG was interpreted by Kimberli Koroma MD,ED Provider. 7:06 AM  CONSULT:  Dr. Judge Omer - cards - will come to see. A/P:  1. CHF - Lasix given. 2. Acute respiratory failure - suspect flash pulmonary edema  3. Renal insufficiency  4.  NSTEMI

## 2018-02-26 NOTE — IP AVS SNAPSHOT
2700 [facility] [address] [place] [address] 
[phone] Patient: Miranda Naqvi MRN: [id number] XOK:[id number] About your hospitalization You were admitted on:  February 26, 2018 You last received care in the:  [facility] 5 OBSERVATION You were discharged on:  March 1, 2018 Why you were hospitalized Your primary diagnosis was:  Acute Respiratory Failure With Hypoxia (Hcc) Your diagnoses also included:  Acute Metabolic Encephalopathy, Nstemi (Non-St Elevated Myocardial Infarction) (Hcc), Acute Systolic Heart Failure (Hcc), Hypokalemia, Pre-Diabetes, Htn (Hypertension), Hypercholesteremia Follow-up Information Follow up With Details Comments Contact Info Godfrey Yan PA-C On 3/5/2018 3:20 PM  [address] [place] 
[phone] Eric Villavicencio NP On 3/21/2018 2:20 PM [address] [place] 
[phone] Macario Marcial MD On 4/17/2018 8:20 AM [address] [place] 
[phone] [address] [place] [address]  Referred for one time home health nursing visit. [address] [facility] [address] 
[phone] Sully Siu MD In 1 week hospital follow up 8864 225 [facility] [address] [place] 
[phone] Discharge Orders None A check luke indicates which time of day the medication should be taken. My Medications START taking these medications Instructions Each Dose to Equal  
 Morning Noon Evening Bedtime  
 aspirin delayed-release 81 mg tablet Start taking on:  3/2/2018 Your last dose was: Your next dose is: Take 1 Tab by mouth daily. 81 mg  
    
   
   
   
  
 furosemide 40 mg tablet Commonly known as:  LASIX Your last dose was: Your next dose is: Take 1 Tab by mouth two (2) times a day.  Indications: Pulmonary Edema due to Chronic Heart Failure 40 mg  
    
   
   
   
  
 losartan 25 mg tablet Commonly known as:  COZAAR Start taking on:  3/2/2018 Your last dose was: Your next dose is: Take 0.5 Tabs by mouth daily. 12.5 mg  
    
   
   
   
  
 metoprolol succinate 25 mg XL tablet Commonly known as:  TOPROL-XL Start taking on:  3/2/2018 Your last dose was: Your next dose is: Take 1 Tab by mouth daily for 30 days. 25 mg  
    
   
   
   
  
 potassium chloride SR 20 mEq tablet Commonly known as:  K-TAB Your last dose was: Your next dose is: Take 1 Tab by mouth daily. Indications: hypokalemia prevention 20 mEq  
    
   
   
   
  
 pravastatin 40 mg tablet Commonly known as:  PRAVACHOL Your last dose was: Your next dose is: Take 1 Tab by mouth nightly for 30 days. Indications: mixed hyperlipidemia 40 mg Where to Get Your Medications Information on where to get these meds will be given to you by the nurse or doctor. ! Ask your nurse or doctor about these medications  
  aspirin delayed-release 81 mg tablet  
 furosemide 40 mg tablet  
 losartan 25 mg tablet  
 metoprolol succinate 25 mg XL tablet  
 potassium chloride SR 20 mEq tablet  
 pravastatin 40 mg tablet Discharge Instructions Discharge Instructions PATIENT ID: Jerson Juarez MRN: [id number] YOB: 1945 DATE OF ADMISSION: 2/26/2018  1:24 AM   
DATE OF DISCHARGE: 3/1/2018 PRIMARY CARE PROVIDER: Mp Braun MD  
 
ATTENDING PHYSICIAN: Jovany Conley MD 
DISCHARGING PROVIDER: Oli Cabrera NP To contact this individual call [phone] and ask the  to page. If unavailable ask to be transferred the Adult Hospitalist Department.  
 
DISCHARGE DIAGNOSES  NSTEMI, Acute Systolic Heart Failure, Hypertension, Pre-Diabetes, Acute Respiratory failure, Hypercholesterolemia CONSULTATIONS: IP CONSULT TO HOSPITALIST 
IP CONSULT TO CARDIOLOGY PROCEDURES/SURGERIES: * No surgery found * PENDING TEST RESULTS:  
At the time of discharge the following test results are still pending: none FOLLOW UP APPOINTMENTS:  
Follow-up Information Follow up With Details Comments Contact Info Carolann Villatoro PA-C On 3/5/2018 3:20 PM  [address] [facility] 57 
[phone] Mauricio Leggett NP On 3/21/2018 2:20 PM [address] [facility] 57 
[phone] Kiah Alfaro MD On 4/17/2018 8:20 AM [address] [facility] 57 
[phone] [address] [place] [address]  Referred for one time home health nursing visit. 7007 [facility] [address] 
[phone] Erasto Fernandez MD In 1 week hospital follow up [address] [place] [address] [address] [facility] 
[phone] ADDITIONAL CARE RECOMMENDATIONS:  
1. You have been prescribed the following new medications: 
-Metoprolol and Cozaar these are blood pressure medications.  
-Lasix this is a fluid pill to help keep the fluid off your lungs. This medication can make your Potassium low therefore recommending taking a supplement daily 
-Pravastatin this is cholesterol lowering medication. See below for more information to include common side effects. 2. Follow up with cardiology as scheduled above 3. See below for pre-diabetes education as your hgb a1c was 6.4. Recommending trying lifestyle changes and exercise and getting your level rechecked in 3 months. Also recommending you have your thyroid level rechecked in 6 weeks as your tsh was elevated and T4 was normal. This was likely from acute illness. DIET: Cardiac ACTIVITY: as tolerated WOUND CARE: keep your groin site clean and dry. EQUIPMENT needed: none DISCHARGE MEDICATIONS: 
 Losartan (Cozaar) - (By mouth) Why this medicine is used:  
Treats high blood pressure. Reduces the risk of stroke in patients with high blood pressure and an enlarged heart. Contact a nurse or doctor right away if you have: · Sweating, trembling, shakiness, increased hunger · Change in how much or how often you urinate · Lightheadedness, dizziness, fainting Common side effects: 
· Diarrhea · Tiredness © 2017 [facility] Information is for End User's use only and may not be sold, redistributed or otherwise used for commercial purposes. Metoprolol (Lopressor, Toprol XL) - (By mouth) Why this medicine is used:  
Treats high blood pressure, chest pain, and heart failure. Contact a nurse or doctor right away if you have: · Lightheadedness, dizziness, fainting · Rapid weight gain; swelling in your hands, ankles, or feet Common side effects: · Mild dizziness · Tiredness © 2017 [facility] Information is for End User's use only and may not be sold, redistributed or otherwise used for commercial purposes. Pravastatin (Pravachol) - (By mouth) Why this medicine is used:  
Treats high cholesterol and triglyceride levels. May reduce the risk of heart attack, stroke, and related health conditions. Contact a nurse or doctor right away if you have: · Dark urine or pale stools, diarrhea, nausea, vomiting, loss of appetite · Yellow skin or eyes · Pain in your upper stomach · Muscle pain, tenderness, weakness · Unusual tiredness Common side effects: 
· Headache © 2017 [facility] Information is for End User's use only and may not be sold, redistributed or otherwise used for commercial purposes. Furosemide (Lasix) - (By mouth) Why this medicine is used:  
Treats fluid retention and high blood pressure. Contact a nurse or doctor right away if you have: · Blistering, peeling, red skin rash · Lightheadedness, dizziness, fainting · Dry mouth, increased thirst, muscle cramps, nausea or vomiting · Uneven heartbeat · Hearing loss, ringing in the ears Common side effects: 
· Loss of appetite, stomach cramps © 2017 [facility] Information is for End User's use only and may not be sold, redistributed or otherwise used for commercial purposes. See Medication Reconciliation Form · It is important that you take the medication exactly as they are prescribed. · Keep your medication in the bottles provided by the pharmacist and keep a list of the medication names, dosages, and times to be taken in your wallet. · Do not take other medications without consulting your doctor. NOTIFY YOUR PHYSICIAN FOR ANY OF THE FOLLOWING:  
Fever over 101 degrees for 24 hours. Chest pain, shortness of breath, fever, chills, nausea, vomiting, diarrhea, change in mentation, falling, weakness, bleeding. Severe pain or pain not relieved by medications. Or, any other signs or symptoms that you may have questions about. DISPOSITION: 
X  Home [address] [place] [address] visit OT  PT  [place]  RN  
  
 SNF/Inpatient Rehab/LTAC Independent/assisted living Hospice Other: PROBLEM LIST Updated: Yes X Signed:  
Giovanna Ramsay NP 
3/1/2018 
8:41 AM 
 
Panda Security Activation Thank you for requesting access to Panda Security. Please follow the instructions below to securely access and download your online medical record. Panda Security allows you to send messages to your doctor, view your test results, renew your prescriptions, schedule appointments, and more. How Do I Sign Up? 1. In your internet browser, go to [email or web address] 
2. Click on the First Time User? Click Here link in the Sign In box. You will be redirect to the New Member Sign Up page. 3. Enter your Panda Security Access Code exactly as it appears below. You will not need to use this code after youve completed the sign-up process.  If you do not sign up before the expiration date, you must request a new code. Archiverâ€™s Access Code: [email or web address] Expires: 2018 12:34 PM (This is the date your Archiverâ€™s access code will ) 4. Enter the last four digits of your Social Security Number (xxxx) and Date of Birth (mm/dd/yyyy) as indicated and click Submit. You will be taken to the next sign-up page. 5. Create a Archiverâ€™s ID. This will be your Archiverâ€™s login ID and cannot be changed, so think of one that is secure and easy to remember. 6. Create a Archiverâ€™s password. You can change your password at any time. 7. Enter your Password Reset Question and Answer. This can be used at a later time if you forget your password. 8. Enter your e-mail address. You will receive e-mail notification when new information is available in [address] 9. Click Sign Up. You can now view and download portions of your medical record. 10. Click the Download Summary menu link to download a portable copy of your medical information. Additional Information If you have questions, please visit the Frequently Asked Questions section of the Archiverâ€™s website at [email or web address] Remember, Archiverâ€™s is NOT to be used for urgent needs. For medical emergencies, dial 911. Archiverâ€™s Announcement We are excited to announce that we are making your provider's discharge notes available to you in Archiverâ€™s. You will see these notes when they are completed and signed by the physician that discharged you from your recent hospital stay. If you have any questions or concerns about any information you see in Archiverâ€™s, please call the Health Information Department where you were seen or reach out to your Primary Care Provider for more information about your plan of care. Introducing [facility]!    
 Talia Singh introduces Archiverâ€™s patient portal. Now you can access parts of your medical record, email your doctor's office, and request medication refills online. 1. In your internet browser, go to [email or web address]. [employer] 2. Click on the First Time User? Click Here link in the Sign In box. You will see the New Member Sign Up page. 3. Enter your Credible Access Code exactly as it appears below. You will not need to use this code after youve completed the sign-up process. If you do not sign up before the expiration date, you must request a new code. · Credible Access Code: [email or web address] Expires: 5/27/2018 12:34 PM 
 
4. Enter the last four digits of your Social Security Number (xxxx) and Date of Birth (mm/dd/yyyy) as indicated and click Submit. You will be taken to the next sign-up page. 5. Create a Credible ID. This will be your Credible login ID and cannot be changed, so think of one that is secure and easy to remember. 6. Create a Credible password. You can change your password at any time. 7. Enter your Password Reset Question and Answer. This can be used at a later time if you forget your password. 8. Enter your e-mail address. You will receive e-mail notification when new information is available in [address] 9. Click Sign Up. You can now view and download portions of your medical record. 10. Click the Download Summary menu link to download a portable copy of your medical information. If you have questions, please visit the Frequently Asked Questions section of the Credible website. Remember, Credible is NOT to be used for urgent needs. For medical emergencies, dial 911. Now available from your iPhone and Android! Providers Seen During Your Hospitalization Provider Specialty Primary office phone Darlene Peterson MD Emergency Medicine [phone] Homa Sims MD Family Practice [phone] Oleksandr Holbrook MD Hospitalist [phone] Mercedes Vaughn MD Internal Medicine [phone] Your Primary Care Physician (PCP) Primary Care Physician Office Phone Office Fax Sarah Hernandez [phone] You are allergic to the following No active allergies Recent Documentation Height Weight Breastfeeding? BMI  
  
 1.676 m 78.6 kg No 27.96 kg/m2 Emergency Contacts Name Discharge Info Relation Home Work Mobile Nura Desai [1] Sister [23] [phone] Patient Belongings The following personal items are in your possession at time of discharge: 
                             
 
  
  
Discharge Instructions Attachments/References HEART FAILURE: AVOIDING TRIGGERS (ENGLISH) PREDIABETES (ENGLISH) Patient Handouts Avoiding Triggers With Heart Failure: Care Instructions Your Care Instructions Triggers are anything that make your heart failure flare up. A flare-up is also called \"sudden heart failure\" or \"acute heart failure. \" When you have a flare-up, fluid builds up in your lungs, and you have problems breathing. You might need to go to the hospital. By watching for changes in your condition and avoiding triggers, you can prevent heart failure flare-ups. Follow-up care is a key part of your treatment and safety. Be sure to make and go to all appointments, and call your doctor if you are having problems. It's also a good idea to know your test results and keep a list of the medicines you take. How can you care for yourself at home? Watch for changes in your weight and condition · Weigh yourself without clothing at the same time each day. Record your weight. Call your doctor if you have sudden weight gain, such as more than 2 to 3 pounds in a day or 5 pounds in a week. (Your doctor may suggest a different range of weight gain.) A sudden weight gain may mean that your heart failure is getting worse. · Keep a daily record of your symptoms.  Write down any changes in how you feel, such as new shortness of breath, cough, or problems eating. Also record if your ankles are more swollen than usual and if you feel more tired than usual. Note anything that you ate or did that could have triggered these changes. Limit sodium Sodium causes your body to hold on to extra water. This may cause your heart failure symptoms to get worse. People get most of their sodium from processed foods. Fast food and restaurant meals also tend to be very high in sodium. · Your doctor may suggest that you limit sodium to 2,000 milligrams (mg) a day or less. That is less than 1 teaspoon of salt a day, including all the salt you eat in cooking or in packaged foods. · Read food labels on cans and food packages. They tell you how much sodium you get in one serving. Check the serving size. If you eat more than one serving, you are getting more sodium. · Be aware that sodium can come in forms other than salt, including monosodium glutamate (MSG), sodium citrate, and sodium bicarbonate (baking soda). MSG is often added to Asian food. You can sometimes ask for food without MSG or salt. · Slowly reducing salt will help you adjust to the taste. Take the salt shaker off the table. · Flavor your food with garlic, lemon juice, onion, vinegar, herbs, and spices instead of salt. Do not use soy sauce, steak sauce, onion salt, garlic salt, mustard, or ketchup on your food, unless it is labeled \"low-sodium\" or \"low-salt. \" 
· Make your own salad dressings, sauces, and ketchup without adding salt. · Use fresh or frozen ingredients, instead of canned ones, whenever you can. Choose low-sodium canned goods. · Eat less processed food and food from restaurants, including fast food. Exercise as directed Moderate, regular exercise is very good for your heart. It improves your blood flow and helps control your weight. But too much exercise can stress your heart and cause a heart failure flare-up. · Check with your doctor before you start an exercise program. 
· Walking is an easy way to get exercise. Start out slowly. Gradually increase the length and pace of your walk. Swimming, riding a bike, and using a treadmill are also good forms of exercise. · When you exercise, watch for signs that your heart is working too hard. You are pushing yourself too hard if you cannot talk while you are exercising. If you become short of breath or dizzy or have chest pain, stop, sit down, and rest. 
· Do not exercise when you do not feel well. Take medicines correctly · Take your medicines exactly as prescribed. Call your doctor if you think you are having a problem with your medicine. · Make a list of all the medicines you take. Include those prescribed to you by other doctors and any over-the-counter medicines, vitamins, or supplements you take. Take this list with you when you go to any doctor. · Take your medicines at the same time every day. It may help you to post a list of all the medicines you take every day and what time of day you take them. · Make taking your medicine as simple as you can. Plan times to take your medicines when you are doing other things, such as eating a meal or getting ready for bed. This will make it easier to remember to take your medicines. · Get organized. Use helpful tools, such as daily or weekly pill containers. When should you call for help? Call 911 if you have symptoms of sudden heart failure such as: 
? · You have severe trouble breathing. ? · You cough up pink, foamy mucus. ? · You have a new irregular or rapid heartbeat. ?Call your doctor now or seek immediate medical care if: 
? · You have new or increased shortness of breath. ? · You are dizzy or lightheaded, or you feel like you may faint. ? · You have sudden weight gain, such as more than 2 to 3 pounds in a day or 5 pounds in a week. (Your doctor may suggest a different range of weight gain.) ? · You have increased swelling in your legs, ankles, or feet. ? · You are suddenly so tired or weak that you cannot do your usual activities. ? Watch closely for changes in your health, and be sure to contact your doctor if you develop new symptoms. Where can you learn more? Go to http://dipesh-conrad.info/. Enter J807 in the search box to learn more about \"Avoiding Triggers With Heart Failure: Care Instructions. \" Current as of: September 21, 2016 Content Version: 11.4 © 9381-4724 [employer]. Care instructions adapted under license by [employer] (which disclaims liability or warranty for this information). If you have questions about a medical condition or this instruction, always ask your healthcare professional. [address] any warranty or liability for your use of this information. Prediabetes: Care Instructions Your Care Instructions Prediabetes is a warning sign that you are at risk for getting type 2 diabetes. It means that your blood sugar is higher than it should be. The food you eat turns into sugar, which your body uses for energy. Normally, an organ called the pancreas makes insulin, which allows the sugar in your blood to get into your body's cells. But when your body can't use insulin the right way, the sugar doesn't move into cells. It stays in your blood instead. This is called insulin resistance. The buildup of sugar in the blood causes prediabetes. The good news is that lifestyle changes may help you get your blood sugar back to normal and help you avoid or delay diabetes. Follow-up care is a key part of your treatment and safety. Be sure to make and go to all appointments, and call your doctor if you are having problems. It's also a good idea to know your test results and keep a list of the medicines you take. How can you care for yourself at home? · Watch your weight. A healthy weight helps your body use insulin properly. · Limit the amount of calories, sweets, and unhealthy fat you eat. Ask your doctor if you should see a dietitian. A registered dietitian can help you create meal plans that fit your lifestyle. · Get at least 30 minutes of exercise on most days of the week. Exercise helps control your blood sugar. It also helps you maintain a healthy weight. Walking is a good choice. You also may want to do other activities, such as running, swimming, cycling, or playing tennis or team sports. · Do not smoke. Smoking can make prediabetes worse. If you need help quitting, talk to your doctor about stop-smoking programs and medicines. These can increase your chances of quitting for good. · If your doctor prescribed medicines, take them exactly as prescribed. Call your doctor if you think you are having a problem with your medicine. You will get more details on the specific medicines your doctor prescribes. When should you call for help? Watch closely for changes in your health, and be sure to contact your doctor if: 
? · You have any symptoms of diabetes. These may include: ¨ Being thirsty more often. ¨ Urinating more. ¨ Being hungrier. ¨ Losing weight. ¨ Being very tired. ¨ Having blurry vision. ? · You have a wound that will not heal.  
? · You have an infection that will not go away. ? · You have problems with your blood pressure. ? · You want more information about diabetes and how you can keep from getting it. Where can you learn more? Go to http://dipesh-conrad.info/. Enter I222 in the search box to learn more about \"Prediabetes: Care Instructions. \" Current as of: March 13, 2017 Content Version: 11.4 © 4634-2526 [employer]. Care instructions adapted under license by [employer] (which disclaims liability or warranty for this information).  If you have questions about a medical condition or this instruction, always ask your healthcare professional. [address] any warranty or liability for your use of this information. Please provide this summary of care documentation to your next provider. Signatures-by signing, you are acknowledging that this After Visit Summary has been reviewed with you and you have received a copy. Patient Signature:  ____________________________________________________________ Date:  ____________________________________________________________  
  
[facility] Signature:  ____________________________________________________________ Date:  ____________________________________________________________

## 2018-02-26 NOTE — CDMP QUERY
The H&P notes pt with CHF Unspecified, can you please further specify the CHF as:      => Acute systolic (congestive) heart failure as evidenced by systolic function severely reduced. EF est. @ 15-20% requiring IV Lasix, Telemetry, Strict I&O and Emergent JOJO    => Other explanation of clinical findings  => Unable to determine (no explanation for clinical findings)    The medical record reflects the following clinical findings, treatment, and risk factors. Risk Factors:  NSTEMI  Clinical Indicators:  EF @ 15-20%  Treatment:  IV Lasix, BNP, Strict I&O, Emergent JOJO,     Please clarify and document your clinical opinion in the progress notes and discharge summary including the definitive and/or presumptive diagnosis, (suspected or probable), related to the above clinical findings. Please include clinical findings supporting your diagnosis.       Thank you  GINO LarsenN, RN, Ashley Covarrubias  [phone]

## 2018-02-27 ENCOUNTER — HOME HEALTH ADMISSION (OUTPATIENT)
Dept: HOME HEALTH SERVICES | Facility: HOME HEALTH | Age: 73
End: 2018-02-27

## 2018-02-27 PROBLEM — I50.21 ACUTE SYSTOLIC HEART FAILURE (HCC): Status: ACTIVE | Noted: 2018-02-27

## 2018-02-27 LAB
ANION GAP SERPL CALC-SCNC: 8 MMOL/L (ref 5–15)
APTT PPP: 99.3 SEC (ref 22.1–32)
BASOPHILS # BLD: 0.1 K/UL (ref 0–0.1)
BASOPHILS NFR BLD: 1 % (ref 0–1)
BUN SERPL-MCNC: 25 MG/DL (ref 6–20)
BUN/CREAT SERPL: 20 (ref 12–20)
CALCIUM SERPL-MCNC: 9.4 MG/DL (ref 8.5–10.1)
CHLORIDE SERPL-SCNC: 101 MMOL/L (ref 97–108)
CO2 SERPL-SCNC: 30 MMOL/L (ref 21–32)
CREAT SERPL-MCNC: 1.26 MG/DL (ref 0.55–1.02)
DIFFERENTIAL METHOD BLD: NORMAL
EOSINOPHIL # BLD: 0.1 K/UL (ref 0–0.4)
EOSINOPHIL NFR BLD: 1 % (ref 0–7)
ERYTHROCYTE [DISTWIDTH] IN BLOOD BY AUTOMATED COUNT: 13.8 % (ref 11.5–14.5)
GLUCOSE BLD STRIP.AUTO-MCNC: 109 MG/DL (ref 65–100)
GLUCOSE BLD STRIP.AUTO-MCNC: 109 MG/DL (ref 65–100)
GLUCOSE BLD STRIP.AUTO-MCNC: 110 MG/DL (ref 65–100)
GLUCOSE BLD STRIP.AUTO-MCNC: 126 MG/DL (ref 65–100)
GLUCOSE SERPL-MCNC: 119 MG/DL (ref 65–100)
HCT VFR BLD AUTO: 39 % (ref 35–47)
HGB BLD-MCNC: 13 G/DL (ref 11.5–16)
IMM GRANULOCYTES # BLD: 0 K/UL (ref 0–0.04)
IMM GRANULOCYTES NFR BLD AUTO: 0 % (ref 0–0.5)
LYMPHOCYTES # BLD: 3.4 K/UL (ref 0.8–3.5)
LYMPHOCYTES NFR BLD: 37 % (ref 12–49)
MAGNESIUM SERPL-MCNC: 2.2 MG/DL (ref 1.6–2.4)
MCH RBC QN AUTO: 31.8 PG (ref 26–34)
MCHC RBC AUTO-ENTMCNC: 33.3 G/DL (ref 30–36.5)
MCV RBC AUTO: 95.4 FL (ref 80–99)
MONOCYTES # BLD: 0.7 K/UL (ref 0–1)
MONOCYTES NFR BLD: 8 % (ref 5–13)
NEUTS SEG # BLD: 4.8 K/UL (ref 1.8–8)
NEUTS SEG NFR BLD: 53 % (ref 32–75)
NRBC # BLD: 0 K/UL (ref 0–0.01)
NRBC BLD-RTO: 0 PER 100 WBC
PHOSPHATE SERPL-MCNC: 4.2 MG/DL (ref 2.6–4.7)
PLATELET # BLD AUTO: 326 K/UL (ref 150–400)
PMV BLD AUTO: 10.9 FL (ref 8.9–12.9)
POTASSIUM SERPL-SCNC: 3.5 MMOL/L (ref 3.5–5.1)
RBC # BLD AUTO: 4.09 M/UL (ref 3.8–5.2)
SERVICE CMNT-IMP: ABNORMAL
SODIUM SERPL-SCNC: 139 MMOL/L (ref 136–145)
THERAPEUTIC RANGE,PTTT: ABNORMAL SECS (ref 58–77)
TROPONIN I SERPL-MCNC: 1.77 NG/ML
WBC # BLD AUTO: 9.1 K/UL (ref 3.6–11)

## 2018-02-27 PROCEDURE — 84484 ASSAY OF TROPONIN QUANT: CPT | Performed by: FAMILY MEDICINE

## 2018-02-27 PROCEDURE — 83735 ASSAY OF MAGNESIUM: CPT | Performed by: FAMILY MEDICINE

## 2018-02-27 PROCEDURE — C1894 INTRO/SHEATH, NON-LASER: HCPCS

## 2018-02-27 PROCEDURE — 77030013744

## 2018-02-27 PROCEDURE — 80048 BASIC METABOLIC PNL TOTAL CA: CPT | Performed by: FAMILY MEDICINE

## 2018-02-27 PROCEDURE — 85025 COMPLETE CBC W/AUTO DIFF WBC: CPT | Performed by: FAMILY MEDICINE

## 2018-02-27 PROCEDURE — C1760 CLOSURE DEV, VASC: HCPCS

## 2018-02-27 PROCEDURE — 74011250637 HC RX REV CODE- 250/637: Performed by: NURSE PRACTITIONER

## 2018-02-27 PROCEDURE — 82962 GLUCOSE BLOOD TEST: CPT

## 2018-02-27 PROCEDURE — 36415 COLL VENOUS BLD VENIPUNCTURE: CPT | Performed by: FAMILY MEDICINE

## 2018-02-27 PROCEDURE — 74011636320 HC RX REV CODE- 636/320: Performed by: SPECIALIST

## 2018-02-27 PROCEDURE — 77030004533 HC CATH ANGI DX IMP BSC -B

## 2018-02-27 PROCEDURE — 74011000250 HC RX REV CODE- 250: Performed by: SPECIALIST

## 2018-02-27 PROCEDURE — B2151ZZ FLUOROSCOPY OF LEFT HEART USING LOW OSMOLAR CONTRAST: ICD-10-PCS | Performed by: SURGERY

## 2018-02-27 PROCEDURE — 74011250636 HC RX REV CODE- 250/636: Performed by: NURSE PRACTITIONER

## 2018-02-27 PROCEDURE — 4A023N7 MEASUREMENT OF CARDIAC SAMPLING AND PRESSURE, LEFT HEART, PERCUTANEOUS APPROACH: ICD-10-PCS | Performed by: SURGERY

## 2018-02-27 PROCEDURE — 77030029065 HC DRSG HEMO QCLOT ZMED -B

## 2018-02-27 PROCEDURE — 85730 THROMBOPLASTIN TIME PARTIAL: CPT | Performed by: NURSE PRACTITIONER

## 2018-02-27 PROCEDURE — 74011250636 HC RX REV CODE- 250/636: Performed by: HOSPITALIST

## 2018-02-27 PROCEDURE — 65660000000 HC RM CCU STEPDOWN

## 2018-02-27 PROCEDURE — 84100 ASSAY OF PHOSPHORUS: CPT | Performed by: FAMILY MEDICINE

## 2018-02-27 PROCEDURE — 74011250636 HC RX REV CODE- 250/636: Performed by: SPECIALIST

## 2018-02-27 PROCEDURE — 99153 MOD SED SAME PHYS/QHP EA: CPT

## 2018-02-27 PROCEDURE — B2111ZZ FLUOROSCOPY OF MULTIPLE CORONARY ARTERIES USING LOW OSMOLAR CONTRAST: ICD-10-PCS | Performed by: SURGERY

## 2018-02-27 RX ORDER — SODIUM CHLORIDE 0.9 % (FLUSH) 0.9 %
5-10 SYRINGE (ML) INJECTION EVERY 8 HOURS
Status: DISCONTINUED | OUTPATIENT
Start: 2018-02-27 | End: 2018-03-01 | Stop reason: HOSPADM

## 2018-02-27 RX ORDER — HEPARIN SODIUM 200 [USP'U]/100ML
2000 INJECTION, SOLUTION INTRAVENOUS ONCE
Status: COMPLETED | OUTPATIENT
Start: 2018-02-27 | End: 2018-02-27

## 2018-02-27 RX ORDER — CLOPIDOGREL 300 MG/1
600 TABLET, FILM COATED ORAL AS NEEDED
Status: DISCONTINUED | OUTPATIENT
Start: 2018-02-27 | End: 2018-02-27

## 2018-02-27 RX ORDER — FENTANYL CITRATE 50 UG/ML
25-200 INJECTION, SOLUTION INTRAMUSCULAR; INTRAVENOUS
Status: DISCONTINUED | OUTPATIENT
Start: 2018-02-27 | End: 2018-02-27

## 2018-02-27 RX ORDER — LIDOCAINE HYDROCHLORIDE 10 MG/ML
5-30 INJECTION INFILTRATION; PERINEURAL AS NEEDED
Status: DISCONTINUED | OUTPATIENT
Start: 2018-02-27 | End: 2018-02-27

## 2018-02-27 RX ORDER — ATROPINE SULFATE 0.1 MG/ML
0.4 INJECTION INTRAVENOUS AS NEEDED
Status: DISCONTINUED | OUTPATIENT
Start: 2018-02-27 | End: 2018-02-27

## 2018-02-27 RX ORDER — SODIUM CHLORIDE 0.9 % (FLUSH) 0.9 %
5-10 SYRINGE (ML) INJECTION AS NEEDED
Status: DISCONTINUED | OUTPATIENT
Start: 2018-02-27 | End: 2018-03-01 | Stop reason: HOSPADM

## 2018-02-27 RX ORDER — HEPARIN SODIUM 1000 [USP'U]/ML
5000 INJECTION, SOLUTION INTRAVENOUS; SUBCUTANEOUS AS NEEDED
Status: DISCONTINUED | OUTPATIENT
Start: 2018-02-27 | End: 2018-02-27

## 2018-02-27 RX ORDER — POTASSIUM CHLORIDE 750 MG/1
40 TABLET, FILM COATED, EXTENDED RELEASE ORAL
Status: COMPLETED | OUTPATIENT
Start: 2018-02-27 | End: 2018-02-27

## 2018-02-27 RX ORDER — MIDAZOLAM HYDROCHLORIDE 1 MG/ML
1-10 INJECTION, SOLUTION INTRAMUSCULAR; INTRAVENOUS
Status: DISCONTINUED | OUTPATIENT
Start: 2018-02-27 | End: 2018-02-27

## 2018-02-27 RX ORDER — METOPROLOL SUCCINATE 25 MG/1
25 TABLET, EXTENDED RELEASE ORAL DAILY
Status: DISCONTINUED | OUTPATIENT
Start: 2018-02-27 | End: 2018-03-01 | Stop reason: HOSPADM

## 2018-02-27 RX ORDER — SODIUM CHLORIDE 0.9 % (FLUSH) 0.9 %
10 SYRINGE (ML) INJECTION AS NEEDED
Status: DISCONTINUED | OUTPATIENT
Start: 2018-02-27 | End: 2018-02-27 | Stop reason: HOSPADM

## 2018-02-27 RX ADMIN — POTASSIUM CHLORIDE 40 MEQ: 750 TABLET, EXTENDED RELEASE ORAL at 08:59

## 2018-02-27 RX ADMIN — LIDOCAINE HYDROCHLORIDE 10 ML: 10 INJECTION, SOLUTION INFILTRATION; PERINEURAL at 13:13

## 2018-02-27 RX ADMIN — Medication 10 ML: at 21:18

## 2018-02-27 RX ADMIN — NITROGLYCERIN 1 INCH: 20 OINTMENT TOPICAL at 21:17

## 2018-02-27 RX ADMIN — FENTANYL CITRATE 25 MCG: 50 INJECTION, SOLUTION INTRAMUSCULAR; INTRAVENOUS at 12:59

## 2018-02-27 RX ADMIN — MIDAZOLAM HYDROCHLORIDE 2 MG: 1 INJECTION, SOLUTION INTRAMUSCULAR; INTRAVENOUS at 12:59

## 2018-02-27 RX ADMIN — IOPAMIDOL 89 ML: 755 INJECTION, SOLUTION INTRAVENOUS at 13:32

## 2018-02-27 RX ADMIN — HEPARIN SODIUM AND DEXTROSE 12 UNITS/KG/HR: 10000; 5 INJECTION INTRAVENOUS at 10:01

## 2018-02-27 RX ADMIN — HEPARIN SODIUM 4000 UNITS: 5000 INJECTION, SOLUTION INTRAVENOUS; SUBCUTANEOUS at 00:08

## 2018-02-27 RX ADMIN — METOPROLOL SUCCINATE 25 MG: 25 TABLET, EXTENDED RELEASE ORAL at 08:59

## 2018-02-27 RX ADMIN — HEPARIN SODIUM 2000 UNITS: 200 INJECTION, SOLUTION INTRAVENOUS at 12:54

## 2018-02-27 RX ADMIN — PRAVASTATIN SODIUM 40 MG: 40 TABLET ORAL at 21:17

## 2018-02-27 RX ADMIN — ASPIRIN 81 MG: 81 TABLET, COATED ORAL at 09:00

## 2018-02-27 RX ADMIN — FUROSEMIDE 40 MG: 10 INJECTION, SOLUTION INTRAMUSCULAR; INTRAVENOUS at 09:01

## 2018-02-27 NOTE — PROGRESS NOTES
1349:  TRANSFER - IN REPORT:    Verbal report received from Yumiko Espino 1313 on Ai Trinidad , from the Cardiac Cath lab, for routine progression of care. Report consisted of patients Situation, Background, Assessment and Recommendations(SBAR). Information from the following report(s) Procedure Summary, Intake/Output, MAR and Recent Results was reviewed with the receiving clinician. Opportunity for questions and clarification was provided. Assessment completed upon patients arrival to [address] [place] [address] and care assumed. Cardiac Cath Lab Recovery Arrival Note:     Ai Trinidad arrived to [facility] recovery area. Patient procedure= LHC. Patient on cardiac monitor, non-invasive blood pressure, Patient status doing well without problems. Patient is A&Ox 4. Patient reports no complaints. Procedure site without any bleeding and no hematoma.

## 2018-02-27 NOTE — PROGRESS NOTES
Care Management: reviewed chart independently and demographics with patient. Has not seen PCP in 5 years on no meds up until this admission. Dr. Andrew Bowen / [phone]. On no medications, will use [facility]. Daughter, sister are support and transportation. No DME equipment. Emergency Contact: sister: Jose Rush : [phone]. Plan is Cardiac Cath 1530 today. To follow transition of care. 1x Home Health visit referral sent to [facility]. [facility] accepted patient for 1x visit. Care Management Interventions  PCP Verified by CM: Yes (5 years ago \" i am on no medecine. \")  Palliative Care Criteria Met (RRAT>21 & CHF Dx)?: No  Mode of Transport at Discharge: Other (see comment) (private auto with daughter)  Transition of Care Consult (CM Consult):  Other (home with daughter ans sister lives near by)  [address] Devontemitreyes Rodney: No  Discharge Durable Medical Equipment: No  Physical Therapy Consult: No  Occupational Therapy Consult: No  Speech Therapy Consult: No  Current Support Network: Lives Alone (daughter and sister near by)  Confirm Follow Up Transport: Family  Plan discussed with Pt/Family/Caregiver: Yes   Resource Information Provided?: No  Discharge Location  Discharge Placement: Spring Solano RN BSN CM

## 2018-02-27 NOTE — DIABETES MGMT
DTC Progress Note    Recommendations/ Comments: Chart review for BG control. BG on arrival 263 mg/dL at 1AM on 2-. BG came down to 112 mg/dl without insulin  Since then results have ranged from 110 mg/dL - 130 mg/dL  In cardiac cath lab at this time. NPO. Current hospital DM medication: lispro normal sensitivity correction scale    Chart reviewed on Leonidas Dorota. Patient is a 71 y.o. female with no hx DM. A1c indicative of Pre DM. Addendum - DTC saw pt for consult for new diagnosis on 2-. A1c:   Lab Results   Component Value Date/Time    Hemoglobin A1c 6.4 (H) 02/26/2018 01:27 AM       Recent Glucose Results:   Lab Results   Component Value Date/Time     (H) 02/27/2018 06:57 AM    GLUCPOC 110 (H) 02/27/2018 11:33 AM    GLUCPOC 126 (H) 02/27/2018 06:52 AM    GLUCPOC 107 (H) 02/26/2018 09:38 PM        Lab Results   Component Value Date/Time    Creatinine 1.26 (H) 02/27/2018 06:57 AM     CrCl cannot be calculated (Unknown ideal weight. ). Active Orders   Diet    DIET NPO Except Meds        PO intake: No data found. Will continue to follow as needed.     Thank you  Ryan Mares RN, CDE

## 2018-02-27 NOTE — PROGRESS NOTES
TRANSFER - OUT REPORT:    Verbal report given to Gina(name) on Les Case  being transferred to OBS(unit) for routine progression of care       Report consisted of patients Situation, Background, Assessment and   Recommendations(SBAR). Information from the following report(s) SBAR was reviewed with the receiving nurse. Lines:   Peripheral IV 02/26/18 Left Arm (Active)   Site Assessment Clean, dry, & intact 2/27/2018 10:01 AM   Phlebitis Assessment 0 2/27/2018 10:01 AM   Infiltration Assessment 0 2/27/2018 10:01 AM   Dressing Status Clean, dry, & intact 2/27/2018 10:01 AM   Dressing Type Transparent;Tape 2/27/2018 10:01 AM   Hub Color/Line Status Blue;End cap changed 2/27/2018 10:01 AM   Action Taken Open ports on tubing capped 2/27/2018 10:01 AM   Alcohol Cap Used Yes 2/27/2018 10:01 AM       Peripheral IV 02/27/18 Right Arm (Active)        Opportunity for questions and clarification was provided.       Patient transported with:   Registered Nurse  Tech

## 2018-02-27 NOTE — PROGRESS NOTES
1250 -   Cardiac Cath Lab Procedure Area Arrival Note:    Keri Bowman arrived to Cardiac Cath Lab, Procedure Area. Patient identifiers verified with NAME and DATE OF BIRTH. Procedure verified with patient. Consent forms verified. Allergies verified. Patient informed of procedure and plan of care. Questions answered with review. Patient voiced understanding of procedure and plan of care. Patient on cardiac monitor, non-invasive blood pressure, SPO2 monitor. Placed on O2 @ 2 lpm via NC.  IV of NS on pump at 25 ml/hr. Patient status doing well without problems. Patient is A&Ox 4. Patient reports no discomfort at this time. Patient medicated during procedure with orders obtained and verified by Dr. Keon Kwon. Refer to patients Cardiac Cath Lab PROCEDURE REPORT for vital signs, assessment, status, and response during procedure, printed at end of case. Printed report on chart or scanned into chart. 0056 - TRANSFER - OUT REPORT:    Verbal report given to [facility] on Keri Bowman  being transferred to (unit) for routine post - op       Report consisted of patients Situation, Background, Assessment and   Recommendations(SBAR). Information from the following report(s) Procedure Summary and MAR was reviewed with the receiving nurse. Lines:   Peripheral IV 02/26/18 Left Arm (Active)   Site Assessment Clean, dry, & intact 2/27/2018 10:01 AM   Phlebitis Assessment 0 2/27/2018 10:01 AM   Infiltration Assessment 0 2/27/2018 10:01 AM   Dressing Status Clean, dry, & intact 2/27/2018 10:01 AM   Dressing Type Transparent;Tape 2/27/2018 10:01 AM   Hub Color/Line Status Blue;End cap changed 2/27/2018 10:01 AM   Action Taken Open ports on tubing capped 2/27/2018 10:01 AM   Alcohol Cap Used Yes 2/27/2018 10:01 AM       Peripheral IV 02/27/18 Right Arm (Active)        Opportunity for questions and clarification was provided.       Patient transported with:   Xiaoi Robert

## 2018-02-27 NOTE — PROGRESS NOTES
Patient arrived from Cath Lab via stretcher  See assessment.  Groin site without hematoma or bleeding

## 2018-02-27 NOTE — PROGRESS NOTES
1505:  TRANSFER - OUT REPORT:    Verbal report given to CLAIR Otero(name) [facility](unit) for routine progression of care       Report consisted of patients Situation, Background, Assessment and   Recommendations(SBAR). Information from the following report(s) Procedure Summary, Intake/Output, MAR and Recent Results was reviewed with the receiving nurse. Lines:   Peripheral IV 02/26/18 Left Arm (Active)   Site Assessment Clean, dry, & intact 2/27/2018 10:01 AM   Phlebitis Assessment 0 2/27/2018 10:01 AM   Infiltration Assessment 0 2/27/2018 10:01 AM   Dressing Status Clean, dry, & intact 2/27/2018 10:01 AM   Dressing Type Transparent;Tape 2/27/2018 10:01 AM   Hub Color/Line Status Blue;End cap changed 2/27/2018 10:01 AM   Action Taken Open ports on tubing capped 2/27/2018 10:01 AM   Alcohol Cap Used Yes 2/27/2018 10:01 AM       Peripheral IV 02/27/18 Right Arm (Active)        Opportunity for questions and clarification was provided. Patient transported with:  Patient transport    Shadow ooze noted of right groin earlier on assessment is unchanged. Dressing removed and a new dressing placed with a quick clot and transparent dressing. Will continue to monitor.

## 2018-02-27 NOTE — PROGRESS NOTES
1815 pedal pulses 1+ right faint. left 1+, previous documentation 2+ bilat. Notified Dr. Lisa Buchanan of assessment. She came to reassess. new orders received.  Reassess in 2 hours

## 2018-02-27 NOTE — PROGRESS NOTES
TRANSFER - IN REPORT:    Verbal report received from terell(name) on Leonidas aRya  being received from [facility](unit) for routine progression of care      Report consisted of patients Situation, Background, Assessment and   Recommendations(SBAR). Information from the following report(s) Kardex, ED Summary, Procedure Summary, Intake/Output and Recent Results was reviewed with the receiving nurse. Opportunity for questions and clarification was provided. Assessment completed upon patients arrival to unit and care assumed.

## 2018-02-27 NOTE — PROGRESS NOTES
Hospitalist Progress Note  Evan Guzman NP  Answering service: [phone] from in house phone  Cell: [phone]      Date of Service:  2018  NAME:  Feliberto Phillips  :    MRN:  [id number]      Admission Summary:   81-year-old white female with no significant past medical history presented to the Emergency Department via EMS from home with reported acute respiratory distress, altered mental status. Patient reports that she became acutely short of breath, which was severe, constant without specific alleviating factors. She notes that she has not seen a \"doctor\" in 9 years since the death of her . Troponin up to 2.46. Echo showing EF of 15-20%. Interval history / Subjective:     Cardiac cath today showed significant RCA disease with good collaterals, no PCI needed   Patient resting in bed with no acute complaints to include any shortness of breath, lower extremity edema, or chest pain.      Assessment & Plan:     Acute Hypoxic respiratory failure   -requiring bipap on admission, currently on O2 via nasal cannula  -likely from volume overload.   -monitor o2 sat, wean o2 to keep sats >37%     Acute Systolic CHF NYHA class  -echo showing EF 15-20%, elevated BNP  -started on BB  -continue IV Lasix BID  -hold on ACE for now d/t GARCIA   -cardiology following, recommending 1-2 more days of IV diuretics  -strict I&O-> remove gomez today patient typically voids without issues  -HF educator    GARCIA-improving   -unsure of baseline likely from uncontrolled HTN   -avoid nephrotoxic meds   -repeat labs in am     Uncontrolled HTN -improved  -started on BB and diuretic   -monitor, may need further adjustments     NSTEMI with CAD  -cath showing RCA disease no PCI  -troponin up to 2.46, EKG no acute changes  -cardiology following   -BB, statin, ASA    Type 2 DM- new dx   -hgb a1c 6.4  -ssi, accuchecks   -DTC consult      Hypokalemia -replenish  -monitor    Hypercholesterolemia   -CHol and LDL elevated   -started on statin    Elevated TSH   -tsh elevated, t4 normal  -will need labs repeats with a pcp in 4-6 weeks     Acute Metabolic Encephalopathy in the setting acute respiratory failure, metabolic acidosis, NSTEMI  -resolved   -CT head normal    Code status: Full  DVT prophylaxis: SCDs    Care Plan discussed with: Patient/Family and Nurse  Disposition: Home w/Family and TBD, hopefully home Thursday     Hospital Problems  Date Reviewed: 2/26/2018          Codes Class Noted POA    Acute systolic heart failure ([facility] [address] ICD-10-CM: I50.21  ICD-9-CM: 428.21  2/27/2018 Unknown        Altered mental status ICD-10-CM: R41.82  ICD-9-CM: 780.97  2/26/2018 Unknown        Elevated troponin ICD-10-CM: R74.8  ICD-9-CM: 790.6  2/26/2018 Unknown        * (Principal)Acute respiratory failure with hypoxia [facility]) ICD-10-CM: J96.01  ICD-9-CM: 518.81  2/26/2018 Unknown                Review of Systems:   A comprehensive review of systems was negative except for that written in the HPI. Vital Signs:    Last 24hrs VS reviewed since prior progress note. Most recent are:  Visit Vitals    /61 (BP 1 Location: Right arm, BP Patient Position: At rest)    Pulse 73    Temp 98 °F (36.7 °C)    Resp 20    Wt 89.9 kg (198 lb 3.1 oz)    SpO2 98%    Breastfeeding No         Intake/Output Summary (Last 24 hours) at 02/27/18 1617  Last data filed at 02/27/18 0327   Gross per 24 hour   Intake                0 ml   Output             2100 ml   Net            -2100 ml        Physical Examination:             Constitutional:  No acute distress, cooperative, pleasant    ENT:  Oral mucous moist, oropharynx benign. Resp:  CTA bilaterally. No wheezing/rhonchi/rales. No accessory muscle use. O2 via NC   CV:  Regular rhythm, normal rate, no murmurs, gallops, rubs. R groin site c/d/i, good pulses    GI:  Soft, non distended, non tender.  normoactive bowel sounds, Musculoskeletal:  trace BLE edema, warm, 2+ pulses throughout    Neurologic:  Moves all extremities. AAOx3, CN II-XII reviewed     Psych:  Good insight, Not anxious nor agitated. Lines: gomez catheter clear yellow urine       Data Review:    Review and/or order of clinical lab test  Review and/or order of tests in the radiology section of CPT  Review and/or order of tests in the medicine section of [facility]      Labs:     Recent Labs      02/27/18   0657  02/26/18   1259   WBC  9.1  10.3   HGB  13.0  12.2   HCT  39.0  36.5   PLT  326  317     Recent Labs      02/27/18   0657  02/26/18   0613  02/26/18   0127   NA  139   --   138   K  3.5   --   3.4*   CL  101   --   104   CO2  30   --   22   BUN  25*   --   20   CREA  1.26*   --   1.49*   GLU  119*   --   263*   CA  9.4   --   8.9   MG  2.2  2.0   --    PHOS  4.2   --    --      Recent Labs      02/26/18   0127   SGOT  16   ALT  17   AP  138*   TBILI  0.3   TP  8.2   ALB  3.4*   GLOB  4.8*     Recent Labs      02/27/18   0657  02/26/18   2122  02/26/18   1259  02/26/18   0127   INR   --    --    --   1.0   PTP   --    --    --   10.4   APTT  99.3*  37.6*  25.6  24.9      No results for input(s): FE, TIBC, PSAT, FERR in the last 72 hours. No results found for: FOL, RBCF   No results for input(s): PH, PCO2, PO2 in the last 72 hours.   Recent Labs      02/27/18   0657  02/26/18   1259  02/26/18 0613   TROIQ  1.77*  2.46*  1.28*     Lab Results   Component Value Date/Time    Cholesterol, total 286 (H) 02/26/2018 01:27 AM    HDL Cholesterol 76 02/26/2018 01:27 AM    LDL, calculated 185.4 (H) 02/26/2018 01:27 AM    Triglyceride 123 02/26/2018 01:27 AM    CHOL/HDL Ratio 3.8 02/26/2018 01:27 AM     Lab Results   Component Value Date/Time    Glucose (POC) 110 (H) 02/27/2018 11:33 AM    Glucose (POC) 126 (H) 02/27/2018 06:52 AM    Glucose (POC) 107 (H) 02/26/2018 09:38 PM    Glucose (POC) 130 (H) 02/26/2018 06:59 PM    Glucose (POC) 112 (H) 02/26/2018 07:25 AM     No results found for: COLOR, APPRN, SPGRU, REFSG, KAZ, PROTU, GLUCU, KETU, BILU, UROU, ROMEL, LEUKU, GLUKE, EPSU, BACTU, WBCU, RBCU, CASTS, UCRY      Medications Reviewed:     Current Facility-Administered Medications   Medication Dose Route Frequency    metoprolol succinate (TOPROL-XL) XL tablet 25 mg  25 mg Oral DAILY    saline peripheral flush soln 10 mL  10 mL InterCATHeter PRN    sodium chloride (NS) flush 5-10 mL  5-10 mL IntraVENous Q8H    sodium chloride (NS) flush 5-10 mL  5-10 mL IntraVENous PRN    glucose chewable tablet 16 g  4 Tab Oral PRN    dextrose (D50W) injection syrg 12.5-25 g  12.5-25 g IntraVENous PRN    glucagon (GLUCAGEN) injection 1 mg  1 mg IntraMUSCular PRN    insulin lispro (HUMALOG) injection   SubCUTAneous AC&HS    pravastatin (PRAVACHOL) tablet 40 mg  40 mg Oral QHS    aspirin delayed-release tablet 81 mg  81 mg Oral DAILY    furosemide (LASIX) injection 40 mg  40 mg IntraVENous BID     ______________________________________________________________________  EXPECTED LENGTH OF STAY: 4d 7h  ACTUAL LENGTH OF STAY:          1                 Tho Sahu NP

## 2018-02-27 NOTE — PROGRESS NOTES
TRANSFER - OUT REPORT:    Verbal report given to Christina(name) on Te Fragoso  being transferred to Cath Lab(unit) for ordered procedure       Report consisted of patients Situation, Background, Assessment and   Recommendations(SBAR). Information from the following report(s) SBAR and Intake/Output was reviewed with the receiving nurse. Lines:   Peripheral IV 02/26/18 Left Arm (Active)   Site Assessment Clean, dry, & intact 2/27/2018  3:27 AM   Phlebitis Assessment 0 2/27/2018  3:27 AM   Infiltration Assessment 0 2/27/2018  3:27 AM   Dressing Status Clean, dry, & intact 2/27/2018  3:27 AM   Dressing Type Transparent;Tape 2/27/2018  3:27 AM   Hub Color/Line Status Blue; Infusing 2/27/2018  3:27 AM   Action Taken Open ports on tubing capped 2/27/2018  3:27 AM   Alcohol Cap Used Yes 2/27/2018  3:27 AM       Peripheral IV 02/26/18 Right Antecubital (Active)   Site Assessment Clean, dry, & intact 2/27/2018  3:27 AM   Phlebitis Assessment 0 2/27/2018  3:27 AM   Infiltration Assessment 0 2/27/2018  3:27 AM   Dressing Status Clean, dry, & intact 2/27/2018  3:27 AM   Dressing Type Transparent;Tape 2/27/2018  3:27 AM   Hub Color/Line Status Pink;Capped 2/27/2018  3:27 AM   Action Taken Open ports on tubing capped 2/27/2018  3:27 AM   Alcohol Cap Used Yes 2/27/2018  3:27 AM        Opportunity for questions and clarification was provided.       Patient transported with:   Registered Nurse  Tech

## 2018-02-27 NOTE — CARDIO/PULMONARY
Cardiac Rehab: Heart Failure education folder, with Low Sodium brochure, given to Keri Bowman. Visited today to follow up with education post  NSTEMI.and to begin HF education. She is for cardiac cath today and procedure time has been moved up. Educated using teach back method. Discussed diagnosis definition and assessed patient understanding. Reviewed importance of daily weight monitoring and Low Sodium diet (4948-1547 mg. Daily). At this time education was ended as she is going down now for the cardiac cath. Keri Gracie is aware that we  plan to see her again to educate her on HF and her MI as well as enroll in Cardiac Wellness.     Georgette Ramirez RN

## 2018-02-27 NOTE — ADVANCED PRACTICE NURSE
Cardiac cath showed significant RCA disease with good collaterals, no PCI needed  Will continue ASA, statin and Toprol XL  Will benefit from another 1-2 days of diuresis so will tentatively plan for discharge on Thursday  May try to start ARB for CHF tomorrow if BP tolerates    Laura Bowman NP

## 2018-02-27 NOTE — PROGRESS NOTES
TRANSFER - IN REPORT:    Verbal report received from Chuy Mendoza 5 on Caro Grace  being received from Cath Lab for ordered procedure      Report consisted of patients Situation, Background, Assessment and   Recommendations(SBAR). Information from the following report(s) SBAR and Procedure Summary was reviewed with the receiving nurse. Opportunity for questions and clarification was provided. Assessment completed upon patients arrival to unit and care assumed.

## 2018-02-27 NOTE — PROGRESS NOTES
Bedside and Verbal shift change report given to luiza (oncoming nurse) by Gian Keenan (offgoing nurse). Report included the following information SBAR, Kardex, Intake/Output, Recent Results and Cardiac Rhythm nsr.

## 2018-02-27 NOTE — PROGRESS NOTES
Cardiac Cath Lab Recovery Arrival Note:      Chloé Arias arrived to Cardiac Cath Lab, Recovery Area. Staff introduced to patient. Patient identifiers verified with NAME and DATE OF BIRTH. Procedure verified with patient. Consent forms reviewed and signed by patient or authorized representative and verified. Allergies verified. Patient informed of procedure and plan of care. Questions answered with review. Patient prepped for procedure, per orders from physician, prior to arrival.    Patient on cardiac monitor, non-invasive blood pressure, SPO2 monitor. Patient is A&Ox 4. Patient reports no complaints. Patient in stretcher, in low position, with side rails up, call bell within reach, patient instructed to call of assistance as needed. Patient prep in: [facility] Recovery Area, Bed# 3. Family in: waiting room. Prep by: TAYLOR Humphries

## 2018-02-27 NOTE — PROGRESS NOTES
[facility] Progress Note    2/27/2018 7:45 AM  Admit Date: 2/26/2018  Admit Diagnosis: Altered mental status;Acute respiratory failure with hypoxi*    Clled back up to reassess distal pulses at 6pm, 1+Dp right, 2+left no change from my exam earlier, nursing reassessment later this evening. Toes cool normal color, good cap refill. Assessment/Plan     1. Acute respiratory failure - CXR with cardiomegaly and mild pulmonary edema, pro-BNP 1895, off BiPAP now, D-dimer 8.6 but VQ scan with low probability for PE, troponin trending up, see treatment for NSTEMI as below  -diuresing with IV Lasix  -TTE showed acute systolic heart failure - see treatment for this below   2. NSTEMI - troponin trending up, last troponin 2.4 yesterday afternoon, awaiting AM troponin results, 12 lead ECG with NSR with LBBB, no previous ECG available for review, NPO after 9am for cardiac cath today at 345pm with Dr. Juan Francisco Hernandez  -continue ASA 81mg daily, metoprolol 25mg BID and pravastatin 40mg at bedtime, currently on IV heparin   -TTE showed LVEF 15-20%  3. Dyslipidemia - , , , HDL 76, started on pravastatin 40mg at bedtime, will need fasting lipids and CMP checked in 6-8 weeks, taking Fish Oil PTA  4. HTN - better controlled on metoprolol  5. DM Type 2 - Hgb A1C 6.4%, seen by DTC, management per IM  6. Elevated TSH 5.3 - but T4 ok at 5.8  7. LBBB - initial ECG with LBBB, no previous ECG available for comparison  8. Hypokalemia - awaiting BMP results from this AM   9. GARCIA - creatinine 1.4 on admission, unsure of baseline, may have chronic renal insufficiency, awaiting BMP results from this AM  10.   Acute systolic heart failure - LVEF 15-20% by TTE, NYHA Class IV on admission, NYHA Class III today, continue diuresis with IV Lasix, will transition metoprolol to Toprol XL today, no ACEi or ARB due to GARCIA   -6 minute walk ordered prior to discharge, ordered one time home health visit prior to discharge for CHF  -has follow up with FABIOLA Lin on 3/5/18     Fam Hx: + early CAD, father passed from MI at age 62, mother passed at age 80 after MI with CHF, two brothers with CABG (age unknown)  Soc Hx: no tobacco use, no etoh use    Cardiac Cath 2/18 - Normal LM and LAD, 30% proximal LCX, RCA proximal 80% then mid 100%, fills well distally via left to right collaterals. LVEF 20%. Echo 2/18 - LV mildly dilated. LVEF 15-20%, severe diffuse hypokinesis, wall thickness was mildly increased, mild to mod MR, mild TR    Subjective: Ai Trinidad denies chest pain, palpitations. Reports her orthopnea has improved, no dyspnea at rest on 2 lpm via nasal cannula. Has not walked around the room at all due to gomez catheter and IV heparin. Understands diagnosis of CHF and plan for cardiac cath this afternoon. No family at bedside yet. Objective:      Physical Exam:  Visit Vitals    /79 (BP 1 Location: Right arm)    Pulse 66    Temp 98.2 °F (36.8 °C)    Resp 18    Wt 198 lb 3.1 oz (89.9 kg)    SpO2 98%    Breastfeeding No     General Appearance:  Well developed, well nourished, alert and oriented x 3, in no acute distress. Ears/Nose/Mouth/Throat:   Hearing grossly normal.         Neck: Supple. Chest:   Bibasilar crackles noted    Cardiovascular:  Regular rate and rhythm, S1, S2 normal, no murmur. Abdomen:   Soft, non-tender, bowel sounds are active. Extremities: No edema bilaterally. Skin: Warm and dry.            Telemetry: normal sinus rhythm with PVCs    Data Review:   Labs:    Recent Results (from the past 24 hour(s))   PTT    Collection Time: 02/26/18  9:22 PM   Result Value Ref Range    aPTT 37.6 (H) 22.1 - 32.0 sec    aPTT, therapeutic range     58.0 - 77.0 SECS   GLUCOSE, POC    Collection Time: 02/26/18  9:38 PM   Result Value Ref Range    Glucose (POC) 107 (H) 65 - 100 mg/dL    Performed by Roberto rCuz, POC    Collection Time: 02/27/18  6:52 AM   Result Value Ref Range    Glucose (POC) 126 (H) 65 - 100 mg/dL    Performed by Keyla Ceron    METABOLIC PANEL, BASIC    Collection Time: 02/27/18  6:57 AM   Result Value Ref Range    Sodium 139 136 - 145 mmol/L    Potassium 3.5 3.5 - 5.1 mmol/L    Chloride 101 97 - 108 mmol/L    CO2 30 21 - 32 mmol/L    Anion gap 8 5 - 15 mmol/L    Glucose 119 (H) 65 - 100 mg/dL    BUN 25 (H) 6 - 20 MG/DL    Creatinine 1.26 (H) 0.55 - 1.02 MG/DL    BUN/Creatinine ratio 20 12 - 20      GFR est AA 51 (L) >60 ml/min/1.73m2    GFR est non-AA 42 (L) >60 ml/min/1.73m2    Calcium 9.4 8.5 - 10.1 MG/DL   MAGNESIUM    Collection Time: 02/27/18  6:57 AM   Result Value Ref Range    Magnesium 2.2 1.6 - 2.4 mg/dL   PHOSPHORUS    Collection Time: 02/27/18  6:57 AM   Result Value Ref Range    Phosphorus 4.2 2.6 - 4.7 MG/DL   CBC WITH AUTOMATED DIFF    Collection Time: 02/27/18  6:57 AM   Result Value Ref Range    WBC 9.1 3.6 - 11.0 K/uL    RBC 4.09 3.80 - 5.20 M/uL    HGB 13.0 11.5 - 16.0 g/dL    HCT 39.0 35.0 - 47.0 %    MCV 95.4 80.0 - 99.0 FL    MCH 31.8 26.0 - 34.0 PG    MCHC 33.3 30.0 - 36.5 g/dL    RDW 13.8 11.5 - 14.5 %    PLATELET 615 765 - 421 K/uL    MPV 10.9 8.9 - 12.9 FL    NRBC 0.0 0  WBC    ABSOLUTE NRBC 0.00 0.00 - 0.01 K/uL    NEUTROPHILS 53 32 - 75 %    LYMPHOCYTES 37 12 - 49 %    MONOCYTES 8 5 - 13 %    EOSINOPHILS 1 0 - 7 %    BASOPHILS 1 0 - 1 %    IMMATURE GRANULOCYTES 0 0.0 - 0.5 %    ABS. NEUTROPHILS 4.8 1.8 - 8.0 K/UL    ABS. LYMPHOCYTES 3.4 0.8 - 3.5 K/UL    ABS. MONOCYTES 0.7 0.0 - 1.0 K/UL    ABS. EOSINOPHILS 0.1 0.0 - 0.4 K/UL    ABS. BASOPHILS 0.1 0.0 - 0.1 K/UL    ABS. IMM.  GRANS. 0.0 0.00 - 0.04 K/UL    DF AUTOMATED     TROPONIN I    Collection Time: 02/27/18  6:57 AM   Result Value Ref Range    Troponin-I, Qt. 1.77 (H) <0.05 ng/mL   PTT    Collection Time: 02/27/18  6:57 AM   Result Value Ref Range    aPTT 99.3 (HH) 22.1 - 32.0 sec    aPTT, therapeutic range     58.0 - 77.0 SECS   GLUCOSE, POC    Collection Time: 02/27/18 11:33 AM   Result Value Ref Range    Glucose (POC) 110 (H) 65 - 100 mg/dL    Performed by Lexi Horn    GLUCOSE, POC    Collection Time: 02/27/18  4:37 PM   Result Value Ref Range    Glucose (POC) 109 (H) 65 - 100 mg/dL    Performed by JOE MCCORMICK            Current Facility-Administered Medications   Medication Dose Route Frequency    nitroglycerin (NITROBID) 2 % ointment 1 Inch  1 Inch Topical Q6H    metoprolol succinate (TOPROL-XL) XL tablet 25 mg  25 mg Oral DAILY    sodium chloride (NS) flush 5-10 mL  5-10 mL IntraVENous Q8H    sodium chloride (NS) flush 5-10 mL  5-10 mL IntraVENous PRN    sodium chloride (NS) flush 5-10 mL  5-10 mL IntraVENous Q8H    sodium chloride (NS) flush 5-10 mL  5-10 mL IntraVENous PRN    glucose chewable tablet 16 g  4 Tab Oral PRN    dextrose (D50W) injection syrg 12.5-25 g  12.5-25 g IntraVENous PRN    glucagon (GLUCAGEN) injection 1 mg  1 mg IntraMUSCular PRN    insulin lispro (HUMALOG) injection   SubCUTAneous AC&HS    pravastatin (PRAVACHOL) tablet 40 mg  40 mg Oral QHS    aspirin delayed-release tablet 81 mg  81 mg Oral DAILY    furosemide (LASIX) injection 40 mg  40 mg IntraVENous BID       Clotilde Stanley MD  [facility] [address] [place] [address] 13 [address] [facility] [address] 030  [address], [address] [facility] [address]  [phone]

## 2018-02-27 NOTE — PROCEDURES
Cardiac Catheterization Procedure Note   Patient: Diana Levi  MRN: [id number]  SSN: [id number]   YOB: 1945 Age: 71 y.o. Sex: female    Date of Procedure: 2/27/2018   Pre-procedure Diagnosis: Congestive Heart Failure  Post-procedure Diagnosis: Congestive Heart Failure and Coronary Artery Disease  Procedure: Left Heart Cath  :  Dr. London Fitch MD    Assistant(s):  None  Anesthesia: Moderate Sedation   Estimated Blood Loss: Less than 10 mL   Specimens Removed: None  Findings: Normal LM and LAD, 30% proximal LCX, RCA proximal 80% then mid 100%, fills well distally via left to right collaterals. LVEF 20%. Mynx to groin.   Complications: None   Implants:  None  Signed by:  London Fitch MD  2/27/2018  1:36 PM

## 2018-02-28 LAB
ANION GAP SERPL CALC-SCNC: 9 MMOL/L (ref 5–15)
BNP SERPL-MCNC: 1265 PG/ML (ref 0–125)
BUN SERPL-MCNC: 29 MG/DL (ref 6–20)
BUN/CREAT SERPL: 22 (ref 12–20)
CALCIUM SERPL-MCNC: 9.2 MG/DL (ref 8.5–10.1)
CHLORIDE SERPL-SCNC: 101 MMOL/L (ref 97–108)
CO2 SERPL-SCNC: 27 MMOL/L (ref 21–32)
CREAT SERPL-MCNC: 1.33 MG/DL (ref 0.55–1.02)
GLUCOSE BLD STRIP.AUTO-MCNC: 110 MG/DL (ref 65–100)
GLUCOSE BLD STRIP.AUTO-MCNC: 115 MG/DL (ref 65–100)
GLUCOSE BLD STRIP.AUTO-MCNC: 121 MG/DL (ref 65–100)
GLUCOSE BLD STRIP.AUTO-MCNC: 126 MG/DL (ref 65–100)
GLUCOSE SERPL-MCNC: 121 MG/DL (ref 65–100)
MAGNESIUM SERPL-MCNC: 2.2 MG/DL (ref 1.6–2.4)
POTASSIUM SERPL-SCNC: 4.2 MMOL/L (ref 3.5–5.1)
SERVICE CMNT-IMP: ABNORMAL
SODIUM SERPL-SCNC: 137 MMOL/L (ref 136–145)

## 2018-02-28 PROCEDURE — 82962 GLUCOSE BLOOD TEST: CPT

## 2018-02-28 PROCEDURE — 80048 BASIC METABOLIC PNL TOTAL CA: CPT | Performed by: NURSE PRACTITIONER

## 2018-02-28 PROCEDURE — 65660000000 HC RM CCU STEPDOWN

## 2018-02-28 PROCEDURE — 83735 ASSAY OF MAGNESIUM: CPT | Performed by: NURSE PRACTITIONER

## 2018-02-28 PROCEDURE — 83880 ASSAY OF NATRIURETIC PEPTIDE: CPT | Performed by: NURSE PRACTITIONER

## 2018-02-28 PROCEDURE — 36415 COLL VENOUS BLD VENIPUNCTURE: CPT | Performed by: NURSE PRACTITIONER

## 2018-02-28 PROCEDURE — 97161 PT EVAL LOW COMPLEX 20 MIN: CPT

## 2018-02-28 PROCEDURE — 74011250637 HC RX REV CODE- 250/637: Performed by: NURSE PRACTITIONER

## 2018-02-28 PROCEDURE — 74011250636 HC RX REV CODE- 250/636: Performed by: NURSE PRACTITIONER

## 2018-02-28 RX ORDER — LOSARTAN POTASSIUM 25 MG/1
12.5 TABLET ORAL DAILY
Status: DISCONTINUED | OUTPATIENT
Start: 2018-02-28 | End: 2018-03-01 | Stop reason: HOSPADM

## 2018-02-28 RX ADMIN — Medication 10 ML: at 14:00

## 2018-02-28 RX ADMIN — LOSARTAN POTASSIUM 12.5 MG: 25 TABLET ORAL at 09:10

## 2018-02-28 RX ADMIN — FUROSEMIDE 40 MG: 10 INJECTION, SOLUTION INTRAMUSCULAR; INTRAVENOUS at 17:26

## 2018-02-28 RX ADMIN — ASPIRIN 81 MG: 81 TABLET, COATED ORAL at 09:10

## 2018-02-28 RX ADMIN — NITROGLYCERIN 1 INCH: 20 OINTMENT TOPICAL at 05:17

## 2018-02-28 RX ADMIN — Medication 10 ML: at 05:19

## 2018-02-28 RX ADMIN — METOPROLOL SUCCINATE 25 MG: 25 TABLET, EXTENDED RELEASE ORAL at 09:10

## 2018-02-28 RX ADMIN — FUROSEMIDE 40 MG: 10 INJECTION, SOLUTION INTRAMUSCULAR; INTRAVENOUS at 09:10

## 2018-02-28 RX ADMIN — PRAVASTATIN SODIUM 40 MG: 40 TABLET ORAL at 21:11

## 2018-02-28 RX ADMIN — Medication 10 ML: at 21:11

## 2018-02-28 NOTE — PROGRESS NOTES
[facility] Progress Note    2/28/2018 8:30 AM  Admit Date: 2/26/2018  Admit Diagnosis: Altered mental status;Acute respiratory failure with hypoxi*    Assessment/Plan     1. Acute respiratory failure - CXR with cardiomegaly and mild pulmonary edema, pro-BNP 1895 on admission  -off BiPAP now, D-dimer 8.6 but VQ scan with low probability for PE  -see treatment for NSTEMI and CHF as below  -diuresing with IV Lasix  2. NSTEMI - troponin peaked at 2.4 and trended down, 12 lead ECG with NSR with LBBB, no previous ECG available for review, cardiac cath revealed significant RCA disease with collaterals and other non-obstructive CAD, no PCI needed, will manage CAD medically  -continue ASA 81mg daily, Toprol XL, pravastatin 40mg at bedtime, will stop NTG paste today  -seen by cardiac rehab post NSTEMI and will follow up with OP cardiac rehab post discharge  3. Dyslipidemia - , , , HDL 76, started on pravastatin 40mg at bedtime, will need fasting lipids and CMP checked in 6-8 weeks, taking Fish Oil PTA  4. HTN - better controlled on Toprol XL  5.  DM Type 2 - Hgb A1C 6.4%, seen by DTC, management per IM  6. Elevated TSH 5.3 - but T4 ok at 5.8  7. LBBB - initial ECG with LBBB, no previous ECG available for comparison  8. Hypokalemia - resolved   9. GARCIA - creatinine 1.4 on admission, unsure of baseline, may have chronic renal insufficiency, creatinine 1.3 today  -likely has CKD stage 2 at baseline  10.   Acute systolic heart failure - LVEF 15-20% by TTE, NYHA Class IV on admission, NYHA Class III today, continue diuresis with IV Lasix today  -will plan to transition to PO Lasix tomorrow with possible discharge  -continue Toprol XL and will begin losartan 12.5mg daily today   -6 minute walk ordered prior to discharge, ordered one time home health visit prior to discharge for CHF  -has follow up with FABIOLA Lin on 3/5/18     Fam Hx: + early CAD, father passed from MI at age 62, mother passed at age 80 after MI with CHF, two brothers with CABG (age unknown)  Soc Hx: no tobacco use, no etoh use    Cardiac Cath 2/18 - Normal LM and LAD, 30% proximal LCX, RCA proximal 80% then mid 100%, fills well distally via left to right collaterals. LVEF 20%. Echo 2/18 - LV mildly dilated. LVEF 15-20%, severe diffuse hypokinesis, wall thickness was mildly increased, mild to mod MR, mild TR    Subjective: Talita Daniels denies chest pain, palpitations. Reports her orthopnea has improved, no dyspnea at rest.  She is off oxygen now. Has not walked around much, will get PT evaluation today. Understands diagnosis of CHF and cardiac cath results. No family at bedside yet. Objective:      Physical Exam:  Visit Vitals    /64    Pulse 74    Temp 98 °F (36.7 °C)    Resp 18    Wt 173 lb 15.1 oz (78.9 kg)    SpO2 95%    Breastfeeding No     General Appearance:  Well developed, well nourished, alert and oriented x 3, in no acute distress. Ears/Nose/Mouth/Throat:   Hearing grossly normal.         Neck: Supple. Chest:   Fewer bibasilar crackles noted    Cardiovascular:  Regular rate and rhythm, S1, S2 normal, no murmur. Abdomen:   Soft, non-tender, bowel sounds are active. Extremities: No edema bilaterally. Right groin without hematoma or bruit   Skin: Warm and dry.            Telemetry: normal sinus rhythm with PVCs    Data Review:   Labs:    Recent Results (from the past 24 hour(s))   GLUCOSE, POC    Collection Time: 02/27/18 11:33 AM   Result Value Ref Range    Glucose (POC) 110 (H) 65 - 100 mg/dL    Performed by [address] [facility] [place], POC    Collection Time: 02/27/18  4:37 PM   Result Value Ref Range    Glucose (POC) 109 (H) 65 - 100 mg/dL    Performed by [address] [facility] [place], POC    Collection Time: 02/27/18 10:06 PM   Result Value Ref Range    Glucose (POC) 109 (H) 65 - 100 mg/dL    Performed by [facility], BASIC    Collection Time: 02/28/18  4:16 AM   Result Value Ref Range    Sodium 137 136 - 145 mmol/L    Potassium 4.2 3.5 - 5.1 mmol/L    Chloride 101 97 - 108 mmol/L    CO2 27 21 - 32 mmol/L    Anion gap 9 5 - 15 mmol/L    Glucose 121 (H) 65 - 100 mg/dL    BUN 29 (H) 6 - 20 MG/DL    Creatinine 1.33 (H) 0.55 - 1.02 MG/DL    BUN/Creatinine ratio 22 (H) 12 - 20      GFR est AA 48 (L) >60 ml/min/1.73m2    GFR est non-AA 39 (L) >60 ml/min/1.73m2    Calcium 9.2 8.5 - 10.1 MG/DL   MAGNESIUM    Collection Time: 02/28/18  4:16 AM   Result Value Ref Range    Magnesium 2.2 1.6 - 2.4 mg/dL   NT-PRO BNP    Collection Time: 02/28/18  5:10 AM   Result Value Ref Range    NT pro-BNP 1265 (H) 0 - 125 PG/ML   GLUCOSE, POC    Collection Time: 02/28/18  5:25 AM   Result Value Ref Range    Glucose (POC) 115 (H) 65 - 100 mg/dL    Performed by Homero Joyce            Current Facility-Administered Medications   Medication Dose Route Frequency    losartan (COZAAR) tablet 12.5 mg  12.5 mg Oral DAILY    metoprolol succinate (TOPROL-XL) XL tablet 25 mg  25 mg Oral DAILY    sodium chloride (NS) flush 5-10 mL  5-10 mL IntraVENous Q8H    sodium chloride (NS) flush 5-10 mL  5-10 mL IntraVENous PRN    sodium chloride (NS) flush 5-10 mL  5-10 mL IntraVENous Q8H    sodium chloride (NS) flush 5-10 mL  5-10 mL IntraVENous PRN    glucose chewable tablet 16 g  4 Tab Oral PRN    dextrose (D50W) injection syrg 12.5-25 g  12.5-25 g IntraVENous PRN    glucagon (GLUCAGEN) injection 1 mg  1 mg IntraMUSCular PRN    insulin lispro (HUMALOG) injection   SubCUTAneous AC&HS    pravastatin (PRAVACHOL) tablet 40 mg  40 mg Oral QHS    aspirin delayed-release tablet 81 mg  81 mg Oral DAILY    furosemide (LASIX) injection 40 mg  40 mg IntraVENous BID       Bogdan Levi MD  [facility] [address] [place] [address] 13, [address] [facility] [address] 064  [facility], [facility]  [phone]

## 2018-02-28 NOTE — PROGRESS NOTES
Hospitalist Progress Note  Janette Villegas NP  Answering service: [phone] from in house phone  Cell: [phone]      Date of Service:  2018  NAME:  Shaista Rasmussen  :    MRN:  [id number]      Admission Summary:   80-year-old white female with no significant past medical history presented to the Emergency Department via EMS from home with reported acute respiratory distress, altered mental status. Patient reports that she became acutely short of breath, which was severe, constant without specific alleviating factors. She notes that she has not seen a \"doctor\" in 9 years since the death of her . Troponin up to 2.46. Echo showing EF of 15-20%. Interval history / Subjective:      Cardiac cath showed significant RCA disease with good collaterals, no PCI needed   Patient resting in bed. Reports she feels much better since the gomez catheter was removed. Denies any difficulties with urinating. No chest pain or shortness of breath. Assessment & Plan:     Acute Hypoxic respiratory failure-resolved   -requiring bipap on admission, currently on RA  -likely from volume overload.      Acute Systolic CHF NYHA class 4 on admission  -echo showing EF 15-20%, elevated BNP  -Metoprolol, Cozaar started this am  -continue IV Lasix BID-> should be able to transition to po tomorrow  -cardiology following,   -strict I&O  -HF educator  -[facility]   -6 minute walk   -cardiac rehab at discharge    GARCIA-improving   -unsure of baseline, suspect underlying CKD likely from uncontrolled HTN   -avoid nephrotoxic meds   -will monitor with starting Cozaar today and diuretics    Uncontrolled HTN -improved  -started on BB and diuretic, Cozaar started today   -monitor, may need further adjustments     NSTEMI with CAD  -cath showing RCA disease no PCI  -troponin up to 2.46, EKG no acute changes  -cardiology following   -BB, statin, ASA    Pre- DM- new dx -hgb a1c 6.4  -ssi, accuchecks   -DTC consult      Hypokalemia-resolved   -replenish  -monitor    Hypercholesterolemia   -CHol and LDL elevated   -started on statin    Elevated TSH   -tsh elevated, t4 normal  -will need labs repeats with a pcp in 4-6 weeks     Acute Metabolic Encephalopathy in the setting acute respiratory failure, metabolic acidosis, NSTEMI  -resolved   -CT head normal    Code status: Full  DVT prophylaxis: SCDs    Care Plan discussed with: Patient/Family and Nurse  Disposition: Home w/Family and TBD, hopefully home tomorrow     Hospital Problems  Date Reviewed: 2/26/2018          Codes Class Noted POA    Acute systolic heart failure ([facility] [address] ICD-10-CM: I50.21  ICD-9-CM: 428.21  2/27/2018 Unknown        Altered mental status ICD-10-CM: R41.82  ICD-9-CM: 780.97  2/26/2018 Unknown        Elevated troponin ICD-10-CM: R74.8  ICD-9-CM: 790.6  2/26/2018 Unknown        * (Principal)Acute respiratory failure with hypoxia [facility]) ICD-10-CM: J96.01  ICD-9-CM: 518.81  2/26/2018 Unknown                Review of Systems:   A comprehensive review of systems was negative except for that written in the HPI. Vital Signs:    Last 24hrs VS reviewed since prior progress note. Most recent are:  Visit Vitals    /64    Pulse 74    Temp 98 °F (36.7 °C)    Resp 18    Wt 78.9 kg (173 lb 15.1 oz)    SpO2 95%    Breastfeeding No         Intake/Output Summary (Last 24 hours) at 02/28/18 1018  Last data filed at 02/27/18 1720   Gross per 24 hour   Intake                0 ml   Output              300 ml   Net             -300 ml        Physical Examination:             Constitutional:  No acute distress, cooperative, pleasant    ENT:  Oral mucous moist, oropharynx benign. Resp:  CTA bilaterally. No wheezing/rhonchi/rales. No accessory muscle use. RA   CV:  Regular rhythm, normal rate, no murmurs, gallops, rubs. R groin site c/d/i,     GI:  Soft, non distended, non tender.  normoactive bowel sounds, Musculoskeletal:  no edema, warm, 1 baltazar dorsalis pedis pulses. Extremities warm to touch    Neurologic:  Moves all extremities. AAOx3, CN II-XII reviewed     Psych:  Good insight, Not anxious nor agitated. Data Review:    Review and/or order of clinical lab test  Review and/or order of tests in the medicine section of [facility]      Labs:     Recent Labs      02/27/18   0657  02/26/18   1259   WBC  9.1  10.3   HGB  13.0  12.2   HCT  39.0  36.5   PLT  326  317     Recent Labs      02/28/18   0416  02/27/18   0657  02/26/18 0613 02/26/18   0127   NA  137  139   --   138   K  4.2  3.5   --   3.4*   CL  101  101   --   104   CO2  27  30   --   22   BUN  29*  25*   --   20   CREA  1.33*  1.26*   --   1.49*   GLU  121*  119*   --   263*   CA  9.2  9.4   --   8.9   MG  2.2  2.2  2.0   --    PHOS   --   4.2   --    --      Recent Labs      02/26/18 0127   SGOT  16   ALT  17   AP  138*   TBILI  0.3   TP  8.2   ALB  3.4*   GLOB  4.8*     Recent Labs      02/27/18   0657  02/26/18 2122  02/26/18   1259  02/26/18   0127   INR   --    --    --   1.0   PTP   --    --    --   10.4   APTT  99.3*  37.6*  25.6  24.9      No results for input(s): FE, TIBC, PSAT, FERR in the last 72 hours. No results found for: FOL, RBCF   No results for input(s): PH, PCO2, PO2 in the last 72 hours.   Recent Labs      02/27/18   0657  02/26/18   1259  02/26/18 0613   TROIQ  1.77*  2.46*  1.28*     Lab Results   Component Value Date/Time    Cholesterol, total 286 (H) 02/26/2018 01:27 AM    HDL Cholesterol 76 02/26/2018 01:27 AM    LDL, calculated 185.4 (H) 02/26/2018 01:27 AM    Triglyceride 123 02/26/2018 01:27 AM    CHOL/HDL Ratio 3.8 02/26/2018 01:27 AM     Lab Results   Component Value Date/Time    Glucose (POC) 115 (H) 02/28/2018 05:25 AM    Glucose (POC) 109 (H) 02/27/2018 10:06 PM    Glucose (POC) 109 (H) 02/27/2018 04:37 PM    Glucose (POC) 110 (H) 02/27/2018 11:33 AM    Glucose (POC) 126 (H) 02/27/2018 06:52 AM     No results found for:  COLOR, APPRN, SPGRU, REFSG, KAZ, PROTU, GLUCU, KETU, BILU, UROU, ROMEL, LEUKU, GLUKE, EPSU, BACTU, WBCU, RBCU, CASTS, UCRY      Medications Reviewed:     Current Facility-Administered Medications   Medication Dose Route Frequency    losartan (COZAAR) tablet 12.5 mg  12.5 mg Oral DAILY    metoprolol succinate (TOPROL-XL) XL tablet 25 mg  25 mg Oral DAILY    sodium chloride (NS) flush 5-10 mL  5-10 mL IntraVENous Q8H    sodium chloride (NS) flush 5-10 mL  5-10 mL IntraVENous PRN    sodium chloride (NS) flush 5-10 mL  5-10 mL IntraVENous Q8H    sodium chloride (NS) flush 5-10 mL  5-10 mL IntraVENous PRN    glucose chewable tablet 16 g  4 Tab Oral PRN    dextrose (D50W) injection syrg 12.5-25 g  12.5-25 g IntraVENous PRN    glucagon (GLUCAGEN) injection 1 mg  1 mg IntraMUSCular PRN    insulin lispro (HUMALOG) injection   SubCUTAneous AC&HS    pravastatin (PRAVACHOL) tablet 40 mg  40 mg Oral QHS    aspirin delayed-release tablet 81 mg  81 mg Oral DAILY    furosemide (LASIX) injection 40 mg  40 mg IntraVENous BID     ______________________________________________________________________  EXPECTED LENGTH OF STAY: 4d 7h  ACTUAL LENGTH OF STAY:          2                 Rona Owen NP

## 2018-02-28 NOTE — PROGRESS NOTES
Bedside shift change report given to [address] [place] [address] (oncoming nurse) by Abigail Rodriguez (offgoing nurse). Report included the following information SBAR.

## 2018-02-28 NOTE — PROGRESS NOTES
Bedside shift change report given to [address] [place] [address] (oncoming nurse) by Eleanor Henderson (offgoing nurse). Report included the following information SBAR.

## 2018-02-28 NOTE — CARDIO/PULMONARY
Cardiac Rehab: Heart Failure education folder, with Low Sodium brochure, given to Jerson Juarez. Educated using teach back method. Discussed diagnosis definition and assessed patient understanding. Reviewed importance of daily weight monitoring and Low Sodium diet (3527-4580 mg. Daily). Weights documented on HF calender. Encouraged activity and rest periods within symptom limitations and as ordered by physician. Reviewed toprol, purpose of medication, potential side effects, compliance, and what to do if dose is missed. Discussed importance of reporting signs and symptoms of exacerbation, and when to report them to the doctor, to prevent re-hospitalization. Jerson Juarez was encouraged to keep all appointments with doctor. Discussed the Cardiac Rehab Program, benefits, format, and encouraged enrollment, when eligible. Patient is interested and we will follow up, by phone, once eligible.   Homero Garcia RN

## 2018-02-28 NOTE — NURSE NAVIGATOR
HF Nurse Navigator consult noted. Cardiac Wellness/Rehab RN saw patient today and provided HF education. HF NN will visit with patient tomorrow.

## 2018-02-28 NOTE — PROGRESS NOTES
physical Therapy EVALUATION/discharge including 6 minute walk test results  Patient: Matthew South (11 y.o. female)  Date: 2/28/2018  Primary Diagnosis: Altered mental status  Acute respiratory failure with hypoxia (HCC)  Elevated troponin  Precautions:    (PALACIO)    ASSESSMENT :  Based on the objective data described below, the patient presents with generally good functional mobility following arrival for assessment following unresponsive episode. Pt planned for D/C to OPPT for cardiac rehab. Pt completed 6MWT today with good balance without DME and near baseline mobility status. Pt lives alone and no concerns noted at this time for returning home alone. VSS and results below for testing. Discussed with RN. Recommend OOB to chair for all meals and majority of day during her stay here at [address] [place] [address] and walking with staff in [address] [place] [address]. Skilled physical therapy is not indicated at this time. PLAN :  Discharge Recommendations: cardiac rehab  Further Equipment Recommendations for Discharge: None       SUBJECTIVE:   Patient stated It feels good to walk. ..    OBJECTIVE DATA SUMMARY:   HISTORY:    No past medical history on file. No past surgical history on file. Prior Level of Function/Home Situation: retired, indep, no DME used  Personal factors and/or comorbidities impacting plan of care: lives alone but German get help if I need it. \"    Home Situation  Home Environment: Private residence  # Steps to Enter: 3  Rails to Enter: Yes  Hand Rails : Bilateral  Wheelchair Ramp: No  One/Two Story Residence: One story  Living Alone: Yes  Patient Expects to be Discharged to[de-identified] Private residence  Current DME Used/Available at Home: None    EXAMINATION/PRESENTATION/DECISION MAKING:   Critical Behavior:     Orientation Level: Appropriate for age  Cognition: Follows commands  Safety/Judgement: Fall prevention  Hearing:   Auditory  Auditory Impairment: None  Range Of Motion:  AROM: Within functional limits Strength:    Strength: Within functional limits                    Tone & Sensation:   Tone: Normal                              Coordination:  Coordination: Within functional limits   Functional Mobility:  Bed Mobility:     Supine to Sit: Additional time;Supervision  Sit to Supine: Independent; Additional time  Scooting: Independent  Transfers:  Sit to Stand: Supervision  Stand to Sit: Independent  Balance:   Sitting: Intact  Standing: Intact  Ambulation/Gait Training:  Gait Description (WDL): Within defined limits    6 MWT results:  Distance Walked in Feet (ft): 750 ft. Rosa Maria Rating of Perceived exertion (0-10 point scale):   Pre Heart Rate: 83 Beginnin   Pre O2 Saturation: 94     Mid walk: 2   Post Heart Rate: 98 End: 2   Post O2 Saturation: 98    Assistive device used:          Normative data: 30-57 years old = 0 feet; Men 39-80 years old = 1889 feet; Women 3680 years rtv=6030 feet  Modified 10 point Rosa Maria RPE scale utilized where 0 = no breathlessness at all; 10 = maximum exertion  Please refer to the flowsheet for any additional vital signs taken during this treatment. Functional Measure:  Barthel: 100/100    G codes: In compliance with CMSs Claims Based Outcome Reporting, the following G-code set was chosen for this patient based on their primary functional limitation being treated: The outcome measure chosen to determine the severity of the functional limitation was the Barthel with a score of 100/100 which was correlated with the impairment scale. ? Mobility - Walking and Moving Around:     - CURRENT STATUS: CH - 0% impaired, limited or restricted    - GOAL STATUS: CH - 0% impaired, limited or restricted    - D/C STATUS:  CH - 0% impaired, limited or restricted     Pain:  Pain Scale 1: Numeric (0 - 10)  Pain Intensity 1: 0              Activity Tolerance:   VSS  Please refer to the flowsheet for vital signs taken during this treatment.   After treatment:   [] Patient left in no apparent distress sitting up in chair  [x]         Patient left in no apparent distress in bed  [x]         Call bell left within reach  [x]         Nursing notified  []         Caregiver present  []         Bed alarm activated    COMMUNICATION/EDUCATION:   Communication/Collaboration:  [x]   Fall prevention education was provided and the patient/caregiver indicated understanding. [x]   Patient/family have participated as able and agree with findings and recommendations. []   Patient is unable to participate in plan of care at this time.   Findings and recommendations were discussed with: Registered Nurse    Thank you for this referral.  Elizabeth Persaud   Time Calculation: 15 mins

## 2018-03-01 VITALS
TEMPERATURE: 98.1 F | SYSTOLIC BLOOD PRESSURE: 116 MMHG | DIASTOLIC BLOOD PRESSURE: 59 MMHG | BODY MASS INDEX: 27.83 KG/M2 | WEIGHT: 173.2 LBS | RESPIRATION RATE: 18 BRPM | HEART RATE: 70 BPM | OXYGEN SATURATION: 93 % | HEIGHT: 66 IN

## 2018-03-01 PROBLEM — I21.4 NSTEMI (NON-ST ELEVATED MYOCARDIAL INFARCTION) (HCC): Status: ACTIVE | Noted: 2018-02-26

## 2018-03-01 PROBLEM — G93.41 ACUTE METABOLIC ENCEPHALOPATHY: Status: ACTIVE | Noted: 2018-02-26

## 2018-03-01 PROBLEM — E78.00 HYPERCHOLESTEREMIA: Status: ACTIVE | Noted: 2018-03-01

## 2018-03-01 PROBLEM — I10 HTN (HYPERTENSION): Status: ACTIVE | Noted: 2018-03-01

## 2018-03-01 PROBLEM — E87.6 HYPOKALEMIA: Status: ACTIVE | Noted: 2018-03-01

## 2018-03-01 PROBLEM — R73.03 PRE-DIABETES: Status: ACTIVE | Noted: 2018-03-01

## 2018-03-01 LAB
ANION GAP SERPL CALC-SCNC: 9 MMOL/L (ref 5–15)
BUN SERPL-MCNC: 34 MG/DL (ref 6–20)
BUN/CREAT SERPL: 26 (ref 12–20)
CALCIUM SERPL-MCNC: 9.2 MG/DL (ref 8.5–10.1)
CHLORIDE SERPL-SCNC: 98 MMOL/L (ref 97–108)
CO2 SERPL-SCNC: 30 MMOL/L (ref 21–32)
CREAT SERPL-MCNC: 1.32 MG/DL (ref 0.55–1.02)
GLUCOSE BLD STRIP.AUTO-MCNC: 114 MG/DL (ref 65–100)
GLUCOSE SERPL-MCNC: 101 MG/DL (ref 65–100)
MAGNESIUM SERPL-MCNC: 2.3 MG/DL (ref 1.6–2.4)
POTASSIUM SERPL-SCNC: 3.6 MMOL/L (ref 3.5–5.1)
SERVICE CMNT-IMP: ABNORMAL
SODIUM SERPL-SCNC: 137 MMOL/L (ref 136–145)

## 2018-03-01 PROCEDURE — 83735 ASSAY OF MAGNESIUM: CPT | Performed by: NURSE PRACTITIONER

## 2018-03-01 PROCEDURE — 74011250637 HC RX REV CODE- 250/637: Performed by: NURSE PRACTITIONER

## 2018-03-01 PROCEDURE — 36415 COLL VENOUS BLD VENIPUNCTURE: CPT | Performed by: NURSE PRACTITIONER

## 2018-03-01 PROCEDURE — 82962 GLUCOSE BLOOD TEST: CPT

## 2018-03-01 PROCEDURE — 80048 BASIC METABOLIC PNL TOTAL CA: CPT | Performed by: NURSE PRACTITIONER

## 2018-03-01 RX ORDER — POTASSIUM CHLORIDE 750 MG/1
20 TABLET, FILM COATED, EXTENDED RELEASE ORAL
Status: COMPLETED | OUTPATIENT
Start: 2018-03-01 | End: 2018-03-01

## 2018-03-01 RX ORDER — FUROSEMIDE 40 MG/1
40 TABLET ORAL 2 TIMES DAILY
Status: DISCONTINUED | OUTPATIENT
Start: 2018-03-01 | End: 2018-03-01 | Stop reason: HOSPADM

## 2018-03-01 RX ORDER — POTASSIUM CHLORIDE 1500 MG/1
20 TABLET, FILM COATED, EXTENDED RELEASE ORAL DAILY
Qty: 1 TAB | Refills: 0 | Status: SHIPPED | OUTPATIENT
Start: 2018-03-01 | End: 2018-03-21 | Stop reason: SDUPTHER

## 2018-03-01 RX ORDER — LOSARTAN POTASSIUM 25 MG/1
12.5 TABLET ORAL DAILY
Qty: 30 TAB | Refills: 0 | Status: SHIPPED | OUTPATIENT
Start: 2018-03-02 | End: 2018-03-21 | Stop reason: SDUPTHER

## 2018-03-01 RX ORDER — FUROSEMIDE 40 MG/1
40 TABLET ORAL 2 TIMES DAILY
Qty: 60 TAB | Refills: 0 | Status: SHIPPED | OUTPATIENT
Start: 2018-03-01 | End: 2018-03-21 | Stop reason: SDUPTHER

## 2018-03-01 RX ORDER — ASPIRIN 81 MG/1
81 TABLET ORAL DAILY
Qty: 30 TAB | Refills: 0 | Status: SHIPPED | OUTPATIENT
Start: 2018-03-02 | End: 2018-03-21 | Stop reason: SDUPTHER

## 2018-03-01 RX ORDER — PRAVASTATIN SODIUM 40 MG/1
40 TABLET ORAL
Qty: 30 TAB | Refills: 0 | Status: SHIPPED | OUTPATIENT
Start: 2018-03-01 | End: 2018-03-21 | Stop reason: SDUPTHER

## 2018-03-01 RX ORDER — POTASSIUM CHLORIDE 750 MG/1
20 TABLET, FILM COATED, EXTENDED RELEASE ORAL DAILY
Status: DISCONTINUED | OUTPATIENT
Start: 2018-03-01 | End: 2018-03-01

## 2018-03-01 RX ORDER — METOPROLOL SUCCINATE 25 MG/1
25 TABLET, EXTENDED RELEASE ORAL DAILY
Qty: 30 TAB | Refills: 0 | Status: SHIPPED | OUTPATIENT
Start: 2018-03-02 | End: 2018-03-21 | Stop reason: SDUPTHER

## 2018-03-01 RX ADMIN — POTASSIUM CHLORIDE 20 MEQ: 750 TABLET, EXTENDED RELEASE ORAL at 09:01

## 2018-03-01 RX ADMIN — LOSARTAN POTASSIUM 12.5 MG: 25 TABLET ORAL at 08:19

## 2018-03-01 RX ADMIN — POTASSIUM CHLORIDE 20 MEQ: 750 TABLET, EXTENDED RELEASE ORAL at 08:28

## 2018-03-01 RX ADMIN — ASPIRIN 81 MG: 81 TABLET, COATED ORAL at 08:19

## 2018-03-01 RX ADMIN — METOPROLOL SUCCINATE 25 MG: 25 TABLET, EXTENDED RELEASE ORAL at 08:19

## 2018-03-01 RX ADMIN — FUROSEMIDE 40 MG: 40 TABLET ORAL at 09:01

## 2018-03-01 RX ADMIN — Medication 10 ML: at 07:07

## 2018-03-01 NOTE — PROGRESS NOTES
Bedside shift change report given to [address] [place] [address] (oncoming nurse) by Blas Gallegos (offgoing nurse). Report included the following information SBAR, Kardex, Intake/Output, Recent Results and Cardiac Rhythm.

## 2018-03-01 NOTE — CARDIO/PULMONARY
Cardiac Wellness; Visited briefly with Evita Holder who is discharging to remind about [facility] appointment, follow up with cardiology and enrollment for Cardiac Wellness. Discussed the [facility] and she verified that they called and she will have a one time visit. She is aware that her f/u appt. is on 3/5/2018 with Ivery Habermann PA. We agreed to call in a few weeks to enroll in Cardiac Wellness. Oumou Hickey RN

## 2018-03-01 NOTE — PROGRESS NOTES
Patient will discharge home today. Hospital to Home visit has been arranged with [facility]. This  called to make a hospital follow up appointment with Dr. Homero De La Cruz and faxed the discharge summary to D#[phone] per their request.      [address] [place] [address]  In 1 day  [facility] [id number]   Lana Frances  In 1 week  Hospital Follow Up Appointment: Thursday, March 15, 2018 at 1:15PM.   [address]  [address] [place] [address]  [phone]     No other discharge needs identified at this time.    JASPAL Ken

## 2018-03-01 NOTE — PROGRESS NOTES
[facility] Progress Note    3/1/2018 8:00 AM  Admit Date: 2/26/2018  Admit Diagnosis: Altered mental status  Acute respiratory failure with hypoxia (HCC)  Elevated troponin    Assessment/Plan     1. Acute respiratory failure - CXR with cardiomegaly and mild pulmonary edema, pro-BNP 1895 on admission  -D-dimer 8.6 but VQ scan with low probability for PE  -improved - treating for NSTEMI and CHF as below  2. NSTEMI - troponin peaked at 2.4 and trended down, 12 lead ECG with NSR with LBBB, no previous ECG available for review, cardiac cath revealed significant RCA disease with collaterals and other non-obstructive CAD, no PCI needed, managing CAD medically  -continue ASA 81mg daily, Toprol XL, pravastatin 40mg at bedtime  -seen by cardiac rehab post NSTEMI and will follow up with OP cardiac rehab post discharge  -ok for discharge today, has follow up with FABIOLA Yanes on 3/5/18  3. Dyslipidemia - , , , HDL 76, started on pravastatin 40mg at bedtime, will need fasting lipids and CMP checked in 6-8 weeks, taking Fish Oil PTA  4. HTN - better controlled on Toprol XL  5.  DM Type 2 - Hgb A1C 6.4%, seen by DTC, management per IM  6. Elevated TSH 5.3 - but T4 ok at 5.8  7. LBBB - initial ECG with LBBB, no previous ECG available for comparison  8. Hypokalemia - K 3.6 today, will begin KCL 20meq PO daily and may consider addition of spironolactone as OP   9. GARCIA - creatinine 1.4 on admission and 1.3 today, this may be her baseline with chronic renal insufficiency  -likely has CKD stage 2 at baseline  10.   Acute systolic heart failure - LVEF 15-20% by TTE, NYHA Class IV on admission, NYHA Class II today, will transition Lasix to 40mg BID for discharge  -continue Toprol XL and losartan 12.5mg daily    -6 minute walk ordered prior to discharge, ordered one time home health visit prior to discharge for CHF  -has follow up with FABIOLA Yanes on 3/5/18     Fam Hx: + early CAD, father passed from MI at age 62, mother passed at age 80 after MI with CHF, two brothers with CABG (age unknown)  Soc Hx: no tobacco use, no etoh use    Cardiac Cath 2/18 - Normal LM and LAD, 30% proximal LCX, RCA proximal 80% then mid 100%, fills well distally via left to right collaterals. LVEF 20%. Echo 2/18 - LV mildly dilated. LVEF 15-20%, severe diffuse hypokinesis, wall thickness was mildly increased, mild to mod MR, mild TR    Subjective: Evelene Mountain denies chest pain, palpitations. Her dyspnea is much better, no dyspnea with exertion. No LE edema. She is excited about going home. Discussed plan for follow up. Objective:      Physical Exam:  Visit Vitals    /59 (BP 1 Location: Left arm, BP Patient Position: At rest;Supine)    Pulse 70    Temp 98.1 °F (36.7 °C)    Resp 18    Ht 5' 6\" (1.676 m)    Wt 173 lb 3.2 oz (78.6 kg)    SpO2 93%    Breastfeeding No    BMI 27.96 kg/m2     General Appearance:  Well developed, well nourished, alert and oriented x 3, in no acute distress. Ears/Nose/Mouth/Throat:   Hearing grossly normal.         Neck: Supple. Chest:   No crackles bilaterally     Cardiovascular:  Regular rate and rhythm, S1, S2 normal, no murmur. Abdomen:   Soft, non-tender, bowel sounds are active. Extremities: No edema bilaterally. Skin: Warm and dry.            Telemetry: normal sinus rhythm with PVCs    Data Review:   Labs:    Recent Results (from the past 24 hour(s))   GLUCOSE, POC    Collection Time: 02/28/18 11:56 AM   Result Value Ref Range    Glucose (POC) 110 (H) 65 - 100 mg/dL    Performed by [employer]    GLUCOSE, POC    Collection Time: 02/28/18  4:40 PM   Result Value Ref Range    Glucose (POC) 121 (H) 65 - 100 mg/dL    Performed by [employer]    GLUCOSE, POC    Collection Time: 02/28/18  9:01 PM   Result Value Ref Range    Glucose (POC) 126 (H) 65 - 100 mg/dL    Performed by [employer],2Nd Floor,2Nd Floor, BASIC    Collection Time: 03/01/18  4:34 AM   Result Value Ref Range    Sodium 137 136 - 145 mmol/L    Potassium 3.6 3.5 - 5.1 mmol/L    Chloride 98 97 - 108 mmol/L    CO2 30 21 - 32 mmol/L    Anion gap 9 5 - 15 mmol/L    Glucose 101 (H) 65 - 100 mg/dL    BUN 34 (H) 6 - 20 MG/DL    Creatinine 1.32 (H) 0.55 - 1.02 MG/DL    BUN/Creatinine ratio 26 (H) 12 - 20      GFR est AA 48 (L) >60 ml/min/1.73m2    GFR est non-AA 40 (L) >60 ml/min/1.73m2    Calcium 9.2 8.5 - 10.1 MG/DL   MAGNESIUM    Collection Time: 03/01/18  4:34 AM   Result Value Ref Range    Magnesium 2.3 1.6 - 2.4 mg/dL   GLUCOSE, POC    Collection Time: 03/01/18  7:06 AM   Result Value Ref Range    Glucose (POC) 114 (H) 65 - 100 mg/dL    Performed by Mk Marcelino            Current Facility-Administered Medications   Medication Dose Route Frequency    potassium chloride SR (KLOR-CON 10) tablet 20 mEq  20 mEq Oral NOW    losartan (COZAAR) tablet 12.5 mg  12.5 mg Oral DAILY    metoprolol succinate (TOPROL-XL) XL tablet 25 mg  25 mg Oral DAILY    sodium chloride (NS) flush 5-10 mL  5-10 mL IntraVENous Q8H    sodium chloride (NS) flush 5-10 mL  5-10 mL IntraVENous PRN    sodium chloride (NS) flush 5-10 mL  5-10 mL IntraVENous Q8H    sodium chloride (NS) flush 5-10 mL  5-10 mL IntraVENous PRN    glucose chewable tablet 16 g  4 Tab Oral PRN    dextrose (D50W) injection syrg 12.5-25 g  12.5-25 g IntraVENous PRN    glucagon (GLUCAGEN) injection 1 mg  1 mg IntraMUSCular PRN    insulin lispro (HUMALOG) injection   SubCUTAneous AC&HS    pravastatin (PRAVACHOL) tablet 40 mg  40 mg Oral QHS    aspirin delayed-release tablet 81 mg  81 mg Oral DAILY    furosemide (LASIX) injection 40 mg  40 mg IntraVENous BID       Preeti Hobbs NP  [facility] [address] [place] [address] [address], [address] [facility] [address]  [facility]  [phone]

## 2018-03-01 NOTE — DISCHARGE INSTRUCTIONS
Discharge Instructions       PATIENT ID: Dotty Adkins  MRN: [id number]   YOB: 1945    DATE OF ADMISSION: 2/26/2018  1:24 AM    DATE OF DISCHARGE: 3/1/2018    PRIMARY CARE PROVIDER: Debbie Weems MD     ATTENDING PHYSICIAN: Dorita Amaro MD  DISCHARGING PROVIDER: Rona Owen NP    To contact this individual call [phone] and ask the  to page. If unavailable ask to be transferred the Adult Hospitalist Department. DISCHARGE DIAGNOSES  NSTEMI, Acute Systolic Heart Failure, Hypertension, Pre-Diabetes, Acute Respiratory failure, Hypercholesterolemia    CONSULTATIONS: IP CONSULT TO HOSPITALIST  IP CONSULT TO CARDIOLOGY    PROCEDURES/SURGERIES: * No surgery found *    PENDING TEST RESULTS:   At the time of discharge the following test results are still pending: none    FOLLOW UP APPOINTMENTS:   Follow-up Information     Follow up With Details Comments Contact JOVANNA Adam On 3/5/2018 3:20 PM  [address]      Mitchel Madrid NP On 3/21/2018 2:20 PM [address]      Nir Bond MD On 4/17/2018 8:20 AM [address]      [address] [place] [address]  Referred for one time home health nursing visit.  [facility] [address]    Lester Ferro MD In 1 week hospital follow up [phone] [facility]  [address] [facility] [address]           ADDITIONAL CARE RECOMMENDATIONS:   1. You have been prescribed the following new medications:  -Metoprolol and Cozaar these are blood pressure medications.   -Lasix this is a fluid pill to help keep the fluid off your lungs. This medication can make your Potassium low therefore recommending taking a supplement daily  -Pravastatin this is cholesterol lowering medication. See below for more information to include common side effects. 2. Follow up with cardiology as scheduled above   3. See below for pre-diabetes education as your hgb a1c was 6.4. Recommending trying lifestyle changes and exercise and getting your level rechecked in 3 months. Also recommending you have your thyroid level rechecked in 6 weeks as your tsh was elevated and T4 was normal. This was likely from acute illness. DIET: Cardiac    ACTIVITY: as tolerated    WOUND CARE: keep your groin site clean and dry. EQUIPMENT needed: none    DISCHARGE MEDICATIONS:   Losartan (Cozaar) - (By mouth)   Why this medicine is used:   Treats high blood pressure. Reduces the risk of stroke in patients with high blood pressure and an enlarged heart. Contact a nurse or doctor right away if you have:  · Sweating, trembling, shakiness, increased hunger  · Change in how much or how often you urinate  · Lightheadedness, dizziness, fainting     Common side effects:  · Diarrhea  · Tiredness  © 2017 [address] Information is for End User's use only and may not be sold, redistributed or otherwise used for commercial purposes. Metoprolol (Lopressor, Toprol XL) - (By mouth)   Why this medicine is used:   Treats high blood pressure, chest pain, and heart failure. Contact a nurse or doctor right away if you have:  · Lightheadedness, dizziness, fainting  · Rapid weight gain; swelling in your hands, ankles, or feet     Common side effects:  · Mild dizziness  · Tiredness  © 2017 [address] Information is for End User's use only and may not be sold, redistributed or otherwise used for commercial purposes. Pravastatin (Pravachol) - (By mouth)   Why this medicine is used:   Treats high cholesterol and triglyceride levels. May reduce the risk of heart attack, stroke, and related health conditions.   Contact a nurse or doctor right away if you have:  · Dark urine or pale stools, diarrhea, nausea, vomiting, loss of appetite  · Yellow skin or eyes  · Pain in your upper stomach  · Muscle pain, tenderness, weakness  · Unusual tiredness     Common side effects:  · Headache  © 2017 [facility] Information is for End User's use only and may not be sold, redistributed or otherwise used for commercial purposes. Furosemide (Lasix) - (By mouth)   Why this medicine is used:   Treats fluid retention and high blood pressure. Contact a nurse or doctor right away if you have:  · Blistering, peeling, red skin rash  · Lightheadedness, dizziness, fainting  · Dry mouth, increased thirst, muscle cramps, nausea or vomiting  · Uneven heartbeat  · Hearing loss, ringing in the ears     Common side effects:  · Loss of appetite, stomach cramps  © 2017 [facility] Information is for End User's use only and may not be sold, redistributed or otherwise used for commercial purposes. See Medication Reconciliation Form    · It is important that you take the medication exactly as they are prescribed. · Keep your medication in the bottles provided by the pharmacist and keep a list of the medication names, dosages, and times to be taken in your wallet. · Do not take other medications without consulting your doctor. NOTIFY YOUR PHYSICIAN FOR ANY OF THE FOLLOWING:   Fever over 101 degrees for 24 hours. Chest pain, shortness of breath, fever, chills, nausea, vomiting, diarrhea, change in mentation, falling, weakness, bleeding. Severe pain or pain not relieved by medications. Or, any other signs or symptoms that you may have questions about. DISPOSITION:  X  Home [address] [place] [address] visit   OT  PT  [place]  RN       SNF/Inpatient Rehab/LTAC    Independent/assisted living    Hospice    Other:       PROBLEM LIST Updated: Yes X       Signed:   Evan Guzman NP  3/1/2018  8:41 AM    3Leaf Activation    Thank you for requesting access to 3Leaf. Please follow the instructions below to securely access and download your online medical record.  3Leaf allows you to send messages to your doctor, view your test results, renew your prescriptions, schedule appointments, and more. How Do I Sign Up? 1. In your internet browser, go to [email or web address]  2. Click on the First Time User? Click Here link in the Sign In box. You will be redirect to the New Member Sign Up page. 3. Enter your Friendsee Access Code exactly as it appears below. You will not need to use this code after youve completed the sign-up process. If you do not sign up before the expiration date, you must request a new code. Friendsee Access Code: [email or web address]  Expires: 2018 12:34 PM (This is the date your Friendsee access code will )    4. Enter the last four digits of your Social Security Number (xxxx) and Date of Birth (mm/dd/yyyy) as indicated and click Submit. You will be taken to the next sign-up page. 5. Create a Friendsee ID. This will be your Friendsee login ID and cannot be changed, so think of one that is secure and easy to remember. 6. Create a Friendsee password. You can change your password at any time. 7. Enter your Password Reset Question and Answer. This can be used at a later time if you forget your password. 8. Enter your e-mail address. You will receive e-mail notification when new information is available in [address] 9. Click Sign Up. You can now view and download portions of your medical record. 10. Click the Download Summary menu link to download a portable copy of your medical information. Additional Information    If you have questions, please visit the Frequently Asked Questions section of the Friendsee website at [email or web address] Remember, Friendsee is NOT to be used for urgent needs. For medical emergencies, dial 911.

## 2018-03-01 NOTE — DISCHARGE SUMMARY
Discharge Summary       PATIENT ID: Sandra Godfrey  MRN: [id number]   YOB: 1945    DATE OF ADMISSION: 2/26/2018  1:24 AM    DATE OF DISCHARGE: 3/1/2018   PRIMARY CARE PROVIDER: Kristy Alexis MD     ATTENDING PHYSICIAN: Sloan Mueller  DISCHARGING PROVIDER: Zoila Porter NP    To contact this individual call [phone] and ask the  to page. If unavailable ask to be transferred the Adult Hospitalist Department. CONSULTATIONS: IP CONSULT TO HOSPITALIST  IP CONSULT TO CARDIOLOGY    PROCEDURES/SURGERIES: * No surgery found *    ADMITTING DIAGNOSES & HOSPITAL COURSE:   Dx: NSTEMI, Acute Systolic Heart Failure, Hypertension, Pre-Diabetes, Acute Respiratory failure, Hypercholesterolemia    71-year-old white female with no significant past medical history presented to the Emergency Department via EMS from home with reported acute respiratory distress, altered mental status.  Patient reports that she became acutely short of breath, which was severe, constant without specific alleviating factors. She notes that she has not seen a \"doctor\" in 9 years since the death of her . Troponin up to 2.46. Echo showing EF of 15-20%. 2/27 cardiac cath showed significant RCA disease with good collaterals, no PCI needed. Patient s/p IV diureses. Currently on RA with no shortness of breath. No recurrent chest pain. Discussed new diagnoses and medications and importance of outpatient follow up. Patient voices understanding. Patient stable for discharge. [facility] and cardiology follow up scheduled.      DISCHARGE DIAGNOSES / PLAN:      Acute Hypoxic respiratory failure-resolved   -requiring bipap on admission, currently on RA  -likely from volume overload.      Acute Systolic CHF NYHA class 4 on admission, class 2-3 on discharge  -echo showing EF 15-20%, elevated BNP  -Metoprolol, Cozaar started this am  -continue IV Lasix BID-> should be able to transition to po tomorrow  -cardiology following,   -strict I&O  -HF educator  -H2H   -6 minute walk   -cardiac rehab at discharge     GARCIA   -unsure of baseline, suspect underlying CKD stage 3 likely from uncontrolled HTN   -stable, continue to monitor OP      Uncontrolled HTN -improved  -started on BB and diuretic, Cozaar started today   -monitor, may need further adjustments      NSTEMI with CAD  -cath showing RCA disease no PCI  -troponin up to 2.46, EKG no acute changes  -cardiology following   -BB, statin, ASA     Pre- DM- new dx   -hgb a1c 6.4  -ssi, accuchecks   -DTC consult       Hypokalemia-resolved   -replenish  -monitor     Hypercholesterolemia   -CHol and LDL elevated   -started on statin     Elevated TSH   -tsh elevated, t4 normal  -will need labs repeats with a pcp in 4-6 weeks      Acute Metabolic Encephalopathy in the setting acute respiratory failure, metabolic acidosis, NSTEMI  -resolved   -CT head normal       PENDING TEST RESULTS:   At the time of discharge the following test results are still pending: none    FOLLOW UP APPOINTMENTS:    Follow-up Information     Follow up With Details Comments Contact Info    Fredrick Jackson PA-C On 3/5/2018 3:20 PM  [address]  [place] [address]  [phone]      Jean Bello NP On 3/21/2018 2:20 PM [address]  [address] [facility]      Atilio Reed MD On 4/17/2018 8:20 AM [address]  [address]  [phone]      [address] [place] [address]  Referred for one time home health nursing visit.  [address]    Helen Ayon MD In 1 week hospital follow up 2612 [facility]  [address] Faby Bettencourt             ADDITIONAL CARE RECOMMENDATIONS:   1. You have been prescribed the following new medications:  -Metoprolol and Cozaar these are blood pressure medications.   -Lasix this is a fluid pill to help keep the fluid off your lungs.  This medication can make your Potassium low therefore recommending taking a supplement daily  -Pravastatin this is cholesterol lowering medication. See below for more information to include common side effects. 2. Follow up with cardiology as scheduled above   3. See below for pre-diabetes education as your hgb a1c was 6.4. Recommending trying lifestyle changes and exercise and getting your level rechecked in 3 months. Also recommending you have your thyroid level rechecked in 6 weeks as your tsh was elevated and T4 was normal. This was likely from acute illness. DIET: Cardiac    ACTIVITY: as tolerated    WOUND CARE: keep your groin site clean and dry. EQUIPMENT needed: none      DISCHARGE MEDICATIONS:  Current Discharge Medication List      START taking these medications    Details   aspirin delayed-release 81 mg tablet Take 1 Tab by mouth daily. Qty: 30 Tab, Refills: 0      losartan (COZAAR) 25 mg tablet Take 0.5 Tabs by mouth daily. Qty: 30 Tab, Refills: 0      furosemide (LASIX) 40 mg tablet Take 1 Tab by mouth two (2) times a day. Indications: Pulmonary Edema due to Chronic Heart Failure  Qty: 60 Tab, Refills: 0      metoprolol succinate (TOPROL-XL) 25 mg XL tablet Take 1 Tab by mouth daily for 30 days. Qty: 30 Tab, Refills: 0      potassium chloride SR (K-TAB) 20 mEq tablet Take 1 Tab by mouth daily. Indications: hypokalemia prevention  Qty: 1 Tab, Refills: 0      pravastatin (PRAVACHOL) 40 mg tablet Take 1 Tab by mouth nightly for 30 days. Indications: mixed hyperlipidemia  Qty: 30 Tab, Refills: 0               NOTIFY YOUR PHYSICIAN FOR ANY OF THE FOLLOWING:   Fever over 101 degrees for 24 hours. Chest pain, shortness of breath, fever, chills, nausea, vomiting, diarrhea, change in mentation, falling, weakness, bleeding. Severe pain or pain not relieved by medications. Or, any other signs or symptoms that you may have questions about.     DISPOSITION:   X Home [address] [place] [address] visit   OT  PT  HH  RN       Long term SNF/Inpatient Rehab    Independent/assisted living    Hospice    Other:       PATIENT CONDITION AT DISCHARGE:     Functional status    Poor     Deconditioned    X Independent      Cognition    X Lucid     Forgetful     Dementia      Catheters/lines (plus indication)    Gomez     PICC     PEG    X None      Code status   X  Full code     DNR      PHYSICAL EXAMINATION AT DISCHARGE:  General: No acute distress, cooperative, pleasant   EENT: EOMI. Anicteric sclerae. Oral mucous moist, oropharynx benign  Resp: CTA bilaterally. No wheezing/rhonchi/rales. No accessory muscle use  CV: Regular rhythm, normal rate, no murmurs, gallops, rubs  GI: Soft, non distended, non tender. normoactive bowel sounds,   Extremities: No edema, warm, 2+ pulses throughout  Neurologic: Moves all extremities. AAOx3, CN II-XII grossly intact  Psych: Good insight. Not anxious nor agitated. Skin: Good Turgor, no rashes or ulcers.  R groin incision c/d/i      CHRONIC MEDICAL DIAGNOSES:  Problem List as of 3/1/2018  Date Reviewed: 3/1/2018          Codes Class Noted - Resolved    Hypokalemia ICD-10-CM: E87.6  ICD-9-CM: 276.8  3/1/2018 - Present        Pre-diabetes ICD-10-CM: R73.03  ICD-9-CM: 790.29  3/1/2018 - Present        HTN (hypertension) ICD-10-CM: I10  ICD-9-CM: 401.9  3/1/2018 - Present        Hypercholesteremia ICD-10-CM: E78.00  ICD-9-CM: 272.0  3/1/2018 - Present        Acute systolic heart failure ([facility] ICD-10-CM: I50.21  ICD-9-CM: 428.21  2/27/2018 - Present        Acute metabolic encephalopathy QUO-69-QF: G93.41  ICD-9-CM: 348.31  2/26/2018 - Present        NSTEMI (non-ST elevated myocardial infarction) ([facility] ICD-10-CM: I21.4  ICD-9-CM: 410.70  2/26/2018 - Present        * (Principal)Acute respiratory failure with hypoxia [facility]) ICD-10-CM: J96.01  ICD-9-CM: 518.81  2/26/2018 - Present              Greater than 40 minutes were spent with the patient on counseling and coordination of care    Signed:   Francisco Santacruz NP  3/1/2018  8:24 AM

## 2018-03-02 ENCOUNTER — TELEPHONE (OUTPATIENT)
Dept: CARDIOLOGY CLINIC | Age: 73
End: 2018-03-02

## 2018-03-02 ENCOUNTER — TELEPHONE (OUTPATIENT)
Dept: CASE MANAGEMENT | Age: 73
End: 2018-03-02

## 2018-03-02 ENCOUNTER — HOME CARE VISIT (OUTPATIENT)
Dept: HOME HEALTH SERVICES | Facility: HOME HEALTH | Age: 73
End: 2018-03-02

## 2018-03-02 NOTE — TELEPHONE ENCOUNTER
Pt's daughter called to see if pt's appointment on 3/5 can be moveed to an earlier time with Jonathan Cole. Please give her a call back @ [phone]. Thanks!   Madhuri Mendez

## 2018-03-02 NOTE — TELEPHONE ENCOUNTER
Spoke with Mrs. Harmon. She states \" I am doing fine. \"  Reviewed daily weights and low sodium diet. Reviewed new prescriptions. Patient has purchased a new digital scale. By her old scale her weight was 163,  Ten lbs less then the hospital.   She will do daily weights. Her daughter has contacted the [facility] office to try to schedule an appointment earlier in the day on the 5th.

## 2018-03-05 ENCOUNTER — OFFICE VISIT (OUTPATIENT)
Dept: CARDIOLOGY CLINIC | Age: 73
End: 2018-03-05

## 2018-03-05 ENCOUNTER — PATIENT OUTREACH (OUTPATIENT)
Dept: CARDIOLOGY CLINIC | Age: 73
End: 2018-03-05

## 2018-03-05 VITALS
BODY MASS INDEX: 30.49 KG/M2 | WEIGHT: 172.1 LBS | SYSTOLIC BLOOD PRESSURE: 130 MMHG | HEIGHT: 63 IN | HEART RATE: 76 BPM | OXYGEN SATURATION: 95 % | RESPIRATION RATE: 16 BRPM | DIASTOLIC BLOOD PRESSURE: 80 MMHG

## 2018-03-05 DIAGNOSIS — I10 ESSENTIAL HYPERTENSION: ICD-10-CM

## 2018-03-05 DIAGNOSIS — N18.2 CKD (CHRONIC KIDNEY DISEASE) STAGE 2, GFR 60-89 ML/MIN: ICD-10-CM

## 2018-03-05 DIAGNOSIS — E78.5 DYSLIPIDEMIA: ICD-10-CM

## 2018-03-05 DIAGNOSIS — I21.4 NSTEMI (NON-ST ELEVATED MYOCARDIAL INFARCTION) (HCC): ICD-10-CM

## 2018-03-05 DIAGNOSIS — I50.22 CHRONIC SYSTOLIC CONGESTIVE HEART FAILURE (HCC): Primary | ICD-10-CM

## 2018-03-05 RX ORDER — PRAVASTATIN SODIUM 40 MG/1
40 TABLET ORAL
COMMUNITY
End: 2018-03-21 | Stop reason: SDUPTHER

## 2018-03-05 RX ORDER — METOPROLOL SUCCINATE 25 MG/1
TABLET, EXTENDED RELEASE ORAL DAILY
COMMUNITY
End: 2018-03-21 | Stop reason: SDUPTHER

## 2018-03-05 RX ORDER — POTASSIUM CHLORIDE 20 MEQ/1
20 TABLET, EXTENDED RELEASE ORAL DAILY
COMMUNITY
End: 2018-03-21 | Stop reason: SDUPTHER

## 2018-03-05 RX ORDER — ASPIRIN 81 MG/1
TABLET ORAL DAILY
COMMUNITY
End: 2018-03-21 | Stop reason: SDUPTHER

## 2018-03-05 RX ORDER — LOSARTAN POTASSIUM 25 MG/1
12.5 TABLET ORAL DAILY
COMMUNITY
End: 2018-03-21 | Stop reason: SDUPTHER

## 2018-03-05 RX ORDER — BISMUTH SUBSALICYLATE 262 MG
1 TABLET,CHEWABLE ORAL DAILY
COMMUNITY

## 2018-03-05 RX ORDER — FUROSEMIDE 40 MG/1
40 TABLET ORAL 2 TIMES DAILY
COMMUNITY
End: 2018-03-21

## 2018-03-05 NOTE — MR AVS SNAPSHOT
[address] [facility] [address] [place] [address] 
[phone] Patient: Gracie Salvador MRN: [id number] Visit Information Date & Time Provider Department Dept. Phone Encounter #  
 3/5/2018  3:20 PM Jagjit Camp, [address] [place] [address] Drive [id number] Your Appointments 3/21/2018  2:20 PM  
ESTABLISHED PATIENT with Arsenio Zimmerman NP  
[facility]) Appt Note: appt reqd by Kole for CHF km[address]  [address] [place] [address] [facility] [address]  
[address] [facility]  
  
    
 2018  8:20 AM  
ESTABLISHED PATIENT with Radha Guzman MD  
[facility] Appt Note: appt reqd by Kole w/Dr. Ulysses Galloway for CHF kmr [address] [place] [address]  
[facility] [address] [facility],[address] [address] Upcoming Health Maintenance Date Due DTaP/Tdap/Td series (1 - Tdap) 1967 BREAST CANCER SCRN MAMMOGRAM 1996 FOBT Q 1 YEAR AGE 50-75 1996 ZOSTER VACCINE AGE 60> 2006 GLAUCOMA SCREENING Q2Y 2011 OSTEOPOROSIS SCREENING (DEXA) 2011 Pneumococcal 65+ Low/Medium Risk (1 of 2 - PCV13) 2011 MEDICARE YEARLY EXAM 2011 Influenza Age 5 to Adult 2017 Allergies as of 3/5/2018  Review Complete On: 3/5/2018 By: Hi Freeman RN Severity Noted Reaction Type Reactions Adhesive Tape-silicones      Rash Current Immunizations  Never Reviewed No immunizations on file. Not reviewed this visit You Were Diagnosed With   
  
 Codes Comments Chronic systolic congestive heart failure (HCC)    -  Primary ICD-10-CM: J14.86 ICD-9-CM: 428.22, 428.0   
 NSTEMI (non-ST elevated myocardial infarction) ([facility]     ICD-10-CM: I21.4 ICD-9-CM: 410.70 Essential hypertension     ICD-10-CM: I10 
ICD-9-CM: 401.9 Dyslipidemia     ICD-10-CM: E78.5 ICD-9-CM: 272.4 CKD (chronic kidney disease) stage 2, GFR 60-89 ml/min     ICD-10-CM: N18.2 ICD-9-CM: 345. 2 Vitals BP Pulse Resp Height(growth percentile) Weight(growth percentile) SpO2  
 130/80 (BP 1 Location: Right arm, BP Patient Position: Sitting) 76 16 5' 3\" (1.6 m) 172 lb 1.6 oz (78.1 kg) 95% BMI Smoking Status 30.49 kg/m2 Never Smoker Vitals History BMI and BSA Data Body Mass Index Body Surface Area  
 30.49 kg/m 2 1.86 m 2 Preferred Pharmacy Pharmacy Name Phone [id number] [place], [place] [phone] Your Updated Medication List  
  
   
This list is accurate as of 3/5/18  4:11 PM.  Always use your most recent med list.  
  
  
  
  
 aspirin delayed-release 81 mg tablet Take  by mouth daily. FISH OIL PO Take  by mouth daily. LASIX 40 mg tablet Generic drug:  furosemide Take 40 mg by mouth two (2) times a day. losartan 25 mg tablet Commonly known as:  COZAAR Take 12.5 mg by mouth daily. metoprolol succinate 25 mg XL tablet Commonly known as:  TOPROL-XL Take  by mouth daily. multivitamin tablet Commonly known as:  ONE A DAY Take 1 Tab by mouth daily. potassium chloride 20 mEq tablet Commonly known as:  K-DUR, KLOR-CON Take 20 mEq by mouth daily. pravastatin 40 mg tablet Commonly known as:  PRAVACHOL Take 40 mg by mouth nightly. Introducing [facility]! [facility] introduces "[employer] patient portal. Now you can access parts of your medical record, email your doctor's office, and request medication refills online. 1. In your internet browser, go to [email or web address] 2. Click on the First Time User? Click Here link in the Sign In box.  You will see the New Member Sign Up page. 3. Enter your [employer] Access Code exactly as it appears below. You will not need to use this code after youve completed the sign-up process. If you do not sign up before the expiration date, you must request a new code. · [employer] Access Code: [email or web address] Expires: 6/3/2018  3:21 PM 
 
4. Enter the last four digits of your Social Security Number (xxxx) and Date of Birth (mm/dd/yyyy) as indicated and click Submit. You will be taken to the next sign-up page. 5. Create a [employer] ID. This will be your [employer] login ID and cannot be changed, so think of one that is secure and easy to remember. 6. Create a [employer] password. You can change your password at any time. 7. Enter your Password Reset Question and Answer. This can be used at a later time if you forget your password. 8. Enter your e-mail address. You will receive e-mail notification when new information is available in [address] 9. Click Sign Up. You can now view and download portions of your medical record. 10. Click the Download Summary menu link to download a portable copy of your medical information. If you have questions, please visit the Frequently Asked Questions section of the [employer] website. Remember, [employer] is NOT to be used for urgent needs. For medical emergencies, dial 911. Now available from your iPhone and Android! Please provide this summary of care documentation to your next provider. Your primary care clinician is listed as Mariana Bello. If you have any questions after today's visit, please call [phone].

## 2018-03-05 NOTE — PROGRESS NOTES
Cardiology Office Progress Note            Patient: Bernadine Bonilla  Diagnosis:     ICD-10-CM ICD-9-CM    1. Chronic systolic congestive heart failure (HCC) I50.22 428.22      428.0    2. NSTEMI (non-ST elevated myocardial infarction) ([facility] I21.4 410.70    3. Essential hypertension I10 401.9    4. Dyslipidemia E78.5 272.4    5. CKD (chronic kidney disease) stage 2, GFR 60-89 ml/min N18.2 585. 2      Date: 3/5/2018     Time: 4:12 PM     Assessment and Plan     1. Systolic heart failure    - LVEF 15-20% by TTE   - NYHA Class II today   - Continue Toprol XL, Losartan and Lasix  2.  NSTEMI    - troponin peaked at 2.4 in hospital   - 12 lead ECG with NSR with LBBB   - cardiac cath:    --  Proximal circumflex: There was a tubular 25 % stenosis. --  Proximal RCA: There was a discrete 80 % stenosis. --  Mid RCA: There was a 100 % stenosis   - continue ASA 81mg daily, Toprol XL, pravastatin 40mg at bedtime   - Cardiac rehab post NSTEMI and will follow up at the 6 week luke  3.  Dyslipidemia    - , , , HDL 76, started on pravastatin 40mg at bedtime   - will need fasting lipids and CMP checked in 6-8 weeks, taking Fish Oil PTA  4.  HTN    - better controlled on Toprol XL and Losartan  5.  DM Type 2    - Hgb A1C 6.4%   - Diet controlled. 6.  Elevated TSH 5.3    - but T4 ok at 5.8 - Patient will follow up with PCP for further check  7.  CKD -    - creatinine 1.4     - Stage 2 at baseline     She is doing well since discharge. She is keeping daily weights and compliant with her medications. I reviewed medications, anatomy and disease process of heart failure. Also discussed dietary changes and the need for reduced sodium intake. She is to follow up with Cardiac Rehab at the 6 week luke following discharge. She was given educational information today in her heart failure packet.   Finally discussed repeating echo in the future to check for EF recovery. current treatment plan is effective, no change in therapy  reviewed diet, exercise and weight control    Future Appointments  Date Time Provider Lorin Pillai   3/21/2018 2:20 PM Lisa Kennedy  [address] 4/17/2018 8:20 AM Pepe Silverman  [address]       Subjective: Sanaz Kenyon denies chest pain, chest pressure/discomfort, dyspnea, fatigue, orthopnea, exertional chest pressure/discomfort, lower extremity edema. ROS:     GENERAL   Recent weight loss - no   Fever -----------------   no   Chills -----------------   no     EYES, VISION   Visual Changes - no     EARS, NOSE, THROAT   Hearing loss ----------- no   Swallowing difficulties - no     CARDIOVASCULAR   Chest pain/pressure ---- no   Arrhythmia/palpitations - no       RESPIRATORY   Cough ------------------ no   Shortness of breath - no   Wheezing -------------- no   GASTROINTESTINAL   Abdominal pain - no   Heartburn -------- no   Bloody stool ----- no     GENITOURINARY   Frequent urination - no   Urgency -------------- no     MUSCULOSKELETAL   Joint pain/swelling ---- no   Musculoskeletal pain - no     SKIN & INTEGUMENTARY   Rashes - no   Sores --- no         NEUROLOGICAL   Numbness/tingling - no   Sensation loss ------ no     PSYCHIATRIC   Nervousness/anxiety - no   Depression -------------- no     ENDOCRINE   Heat/cold intolerance - no   Excessive thirst -------- no     HEMATOLOGIC/LYMPHATIC   Abnormal bleeding - no     ALL/IMMUN   Allergic reaction ------ no   Recurrent infections - no         Objective:      Physical Exam:                Visit Vitals    /80 (BP 1 Location: Right arm, BP Patient Position: Sitting)    Pulse 76    Resp 16    Ht 5' 3\" (1.6 m)    Wt 172 lb 1.6 oz (78.1 kg)    SpO2 95%    BMI 30.49 kg/m2        General Appearance:   Well developed, well nourished,alert and oriented x 3, and   individual in no acute distress.    Ears/Nose/Mouth/Throat: Hearing grossly normal.         Neck:  Supple. Chest:    Lungs clear to auscultation bilaterally. Cardiovascular:   Regular rate and rhythm, S1, S2 normal, no murmur. Abdomen:    Soft, non-tender, bowel sounds are active. Extremities:  No edema bilaterally. Skin:  Warm and dry. Data Review:    Labs:  No results found for this or any previous visit (from the past 24 hour(s)). Radiology:        Current Outpatient Prescriptions   Medication Sig    aspirin delayed-release 81 mg tablet Take  by mouth daily.  furosemide (LASIX) 40 mg tablet Take 40 mg by mouth two (2) times a day.  losartan (COZAAR) 25 mg tablet Take 12.5 mg by mouth daily.  metoprolol succinate (TOPROL-XL) 25 mg XL tablet Take  by mouth daily.  potassium chloride (K-DUR, KLOR-CON) 20 mEq tablet Take 20 mEq by mouth daily.  pravastatin (PRAVACHOL) 40 mg tablet Take 40 mg by mouth nightly.  DOCOSAHEXANOIC ACID/EPA (FISH OIL PO) Take  by mouth daily.  multivitamin (ONE A DAY) tablet Take 1 Tab by mouth daily. No current facility-administered medications for this visit.            Ashlee Reyez PA-C     [facility] [address] [place] [address] Drive, [address] [facility] [place] [address]   [facility], [address]   [phone]

## 2018-03-05 NOTE — PROGRESS NOTES
Heart Failure Initial Call    NN contacted the patient by telephone to perform CHF Follow Up. Verified  and address as identifiers. She has a visit with P.A. Today in the office - Ms. Amauri Hernandez admits to not seeing a doctor for a long time - but she is compliant with the discharge instructions - Home health nurse visit scheduled for 3/9/ and 3/16. Noted Priorities/Plan:  New diagnosis HF - HFrEF -    Daily weights/ daily bp checks to evaluate meds - pt got a bp cuff - home health nurse reviewed it with her. Daily Weight (document daily weights in flowsheets):   Decrease   Amount:            Zone:(Pt Reported)  Green    Goals     None         Needs addressed from pathway: (add in the appropriate pathway)     PCP: Brant Frey - pt has seen since discharge. Cardiologist: Aruna Phillip    Follow up appointment with Cardiology (1-3 days): 3/5/18  Follow up appointment with PCP (within 14 days): yes       Provider Department Center   3/21/2018 2:20 PM Bary Libman, NP [facility] [address]   2018 8:20 AM Mateo Conn MD [facility]        Cardiologist consult while IP: yes     Palliative consult while IP:    No     EF: SUMMARY:  Left ventricle: The ventricle was mildly dilated. Systolic function was severely reduced. Ejection fraction was estimated to be 20 % in the range of 15 % to 20 %. There was severe diffuse hypokinesis. Wall thickness was  mildly increased.   Type of HF:   HFrEF     Cardiac Device present: none      Heart Failure Medications: Betablocker, Diuretic, Potassium, Anticoagulat     Disposition of patient:  Home, Hospital 2 Home     [facility] Services/Tele Monitoring:  Home Health/[facility]     Social support: lives alone    Do you have a Scale:    yes   How often do you weigh:  daily  Does patient have an Advance Directive:  reviewed and current     Advance Directive scanned into patients chart:  yes     Patient reminded that there are physicians on call 24 hours a day / 7 days a week (M-F 5pm to 8am and from Friday 5pm until Monday 8a for the weekend) should the patient have questions or concerns. Patient reminded to call 911 if situation is emergent or patient feels the situation is emergent. Pt verbalizes understanding.     Daniela Jarrell RN , CHFN, [facility]  NN [facility] JUAN MANUEL [place]  [phone]    Letter, enclosures -

## 2018-03-08 ENCOUNTER — HOME CARE VISIT (OUTPATIENT)
Dept: SCHEDULING | Facility: HOME HEALTH | Age: 73
End: 2018-03-08

## 2018-03-08 PROCEDURE — G0299 HHS/HOSPICE OF RN EA 15 MIN: HCPCS

## 2018-03-16 ENCOUNTER — HOME CARE VISIT (OUTPATIENT)
Dept: SCHEDULING | Facility: HOME HEALTH | Age: 73
End: 2018-03-16

## 2018-03-16 PROCEDURE — G0299 HHS/HOSPICE OF RN EA 15 MIN: HCPCS

## 2018-03-19 VITALS — SYSTOLIC BLOOD PRESSURE: 107 MMHG | BODY MASS INDEX: 29.76 KG/M2 | DIASTOLIC BLOOD PRESSURE: 66 MMHG | WEIGHT: 168 LBS

## 2018-03-21 ENCOUNTER — OFFICE VISIT (OUTPATIENT)
Dept: CARDIOLOGY CLINIC | Age: 73
End: 2018-03-21

## 2018-03-21 ENCOUNTER — HOSPITAL ENCOUNTER (OUTPATIENT)
Dept: LAB | Age: 73
Discharge: HOME | End: 2018-03-21
Payer: MEDICARE

## 2018-03-21 VITALS
SYSTOLIC BLOOD PRESSURE: 140 MMHG | HEART RATE: 80 BPM | WEIGHT: 169.2 LBS | HEIGHT: 63 IN | BODY MASS INDEX: 29.98 KG/M2 | RESPIRATION RATE: 16 BRPM | DIASTOLIC BLOOD PRESSURE: 100 MMHG

## 2018-03-21 DIAGNOSIS — I25.10 CORONARY ARTERY DISEASE INVOLVING NATIVE HEART WITHOUT ANGINA PECTORIS, UNSPECIFIED VESSEL OR LESION TYPE: Primary | ICD-10-CM

## 2018-03-21 DIAGNOSIS — I21.4 NSTEMI (NON-ST ELEVATED MYOCARDIAL INFARCTION) (HCC): ICD-10-CM

## 2018-03-21 DIAGNOSIS — I50.22 CHRONIC SYSTOLIC CONGESTIVE HEART FAILURE (HCC): ICD-10-CM

## 2018-03-21 DIAGNOSIS — I95.2 HYPOTENSION DUE TO DRUGS: ICD-10-CM

## 2018-03-21 DIAGNOSIS — E87.6 HYPOKALEMIA: ICD-10-CM

## 2018-03-21 PROCEDURE — 83735 ASSAY OF MAGNESIUM: CPT

## 2018-03-21 PROCEDURE — 36415 COLL VENOUS BLD VENIPUNCTURE: CPT

## 2018-03-21 PROCEDURE — 80048 BASIC METABOLIC PNL TOTAL CA: CPT

## 2018-03-21 RX ORDER — POTASSIUM CHLORIDE 1500 MG/1
20 TABLET, FILM COATED, EXTENDED RELEASE ORAL DAILY
Qty: 90 TAB | Refills: 3 | Status: SHIPPED | OUTPATIENT
Start: 2018-03-21 | End: 2019-03-15 | Stop reason: SDUPTHER

## 2018-03-21 RX ORDER — METOPROLOL SUCCINATE 25 MG/1
12.5 TABLET, EXTENDED RELEASE ORAL DAILY
Qty: 45 TAB | Refills: 3 | Status: SHIPPED | OUTPATIENT
Start: 2018-03-21 | End: 2018-05-30 | Stop reason: SDUPTHER

## 2018-03-21 RX ORDER — FUROSEMIDE 40 MG/1
40 TABLET ORAL DAILY
Qty: 90 TAB | Refills: 3 | Status: SHIPPED | OUTPATIENT
Start: 2018-03-21 | End: 2018-05-30 | Stop reason: SDUPTHER

## 2018-03-21 RX ORDER — LOSARTAN POTASSIUM 25 MG/1
12.5 TABLET ORAL DAILY
Qty: 45 TAB | Refills: 3 | Status: SHIPPED | OUTPATIENT
Start: 2018-03-21 | End: 2018-05-30 | Stop reason: SDUPTHER

## 2018-03-21 RX ORDER — ASPIRIN 81 MG/1
81 TABLET ORAL DAILY
Qty: 90 TAB | Refills: 3 | Status: SHIPPED | OUTPATIENT
Start: 2018-03-21 | End: 2019-03-16 | Stop reason: SDUPTHER

## 2018-03-21 RX ORDER — PRAVASTATIN SODIUM 40 MG/1
40 TABLET ORAL
Qty: 90 TAB | Refills: 3 | Status: SHIPPED | OUTPATIENT
Start: 2018-03-21 | End: 2018-05-30 | Stop reason: SDUPTHER
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
## 2018-03-21 NOTE — MR AVS SNAPSHOT
[address] [facility] [address] [place] 
[phone] Patient: Mendez Corcoran MRN: [id number] UPQ:0/16/9426 Visit Information Date & Time Provider Department Dept. Phone Encounter #  
 3/21/2018 10:20 AM Jt Humphries MD [facility] [phone] [id number] Your Appointments 4/17/2018  8:20 AM  
ESTABLISHED PATIENT with Jt Humphries MD  
[facility] ([address]) Appt Note: appt reqd by Gus Zepeda w/Dr. Jerson Avelar for CHF kmr [address]  [address] [place]  
[address] [address] Upcoming Health Maintenance Date Due Hepatitis C Screening 1945 DTaP/Tdap/Td series (1 - Tdap) 8/16/1967 BREAST CANCER SCRN MAMMOGRAM 8/16/1996 FOBT Q 1 YEAR AGE 50-75 8/16/1996 ZOSTER VACCINE AGE 60> 6/16/2006 GLAUCOMA SCREENING Q2Y 8/16/2011 Bone Densitometry (Dexa) Screening 8/16/2011 Pneumococcal 65+ Low/Medium Risk (1 of 2 - PCV13) 8/16/2011 Influenza Age 5 to Adult 8/1/2017 MEDICARE YEARLY EXAM 3/14/2018 Allergies as of 3/21/2018  Review Complete On: 3/21/2018 By: Jodee Grubbs Severity Noted Reaction Type Reactions Adhesive Tape-silicones  06/66/6207    Rash Current Immunizations  Reviewed on 2/27/2018 No immunizations on file. Not reviewed this visit You Were Diagnosed With   
  
 Codes Comments Coronary artery disease involving native heart without angina pectoris, unspecified vessel or lesion type    -  Primary ICD-10-CM: I25.10 ICD-9-CM: 414.01 Chronic systolic congestive heart failure (HCC)     ICD-10-CM: I50.22 ICD-9-CM: 428.22, 428.0 Hypotension due to drugs     ICD-10-CM: I95.2 ICD-9-CM: 458.8, E947.9 NSTEMI (non-ST elevated myocardial infarction) ([facility] [address]     ICD-10-CM: I21.4 ICD-9-CM: 410.70 Hypokalemia     ICD-10-CM: E87.6 ICD-9-CM: 276.8 Vitals BP Pulse Resp Height(growth percentile) Weight(growth percentile) BMI  
 (!) 140/100 (BP 1 Location: Left arm, BP Patient Position: Sitting) 80 16 5' 3\" (1.6 m) 169 lb 3.2 oz (76.7 kg) 29.97 kg/m2 Smoking Status Never Smoker Vitals History BMI and BSA Data Body Mass Index Body Surface Area  
 29.97 kg/m 2 1.85 m 2 Preferred Pharmacy [facility] Phone [phone] Your Updated Medication List  
  
   
This list is accurate as of 3/21/18 10:47 AM.  Always use your most recent med list.  
  
  
  
  
 aspirin delayed-release 81 mg tablet Take 1 Tab by mouth daily. FISH OIL PO Take  by mouth daily. furosemide 40 mg tablet Commonly known as:  LASIX Take 1 Tab by mouth daily. losartan 25 mg tablet Commonly known as:  COZAAR Take 0.5 Tabs by mouth daily. metoprolol succinate 25 mg XL tablet Commonly known as:  TOPROL-XL Take 0.5 Tabs by mouth daily. multivitamin tablet Commonly known as:  ONE A DAY Take 1 Tab by mouth daily. potassium chloride SR 20 mEq tablet Commonly known as:  K-TAB Take 1 Tab by mouth daily. Indications: hypokalemia prevention  
  
 pravastatin 40 mg tablet Commonly known as:  PRAVACHOL Take 1 Tab by mouth nightly. Indications: mixed hyperlipidemia Prescriptions Sent to Pharmacy Refills  
 furosemide (LASIX) 40 mg tablet 3 Sig: Take 1 Tab by mouth daily. Class: Normal  
 Pharmacy: [address] [place] [address] #: [phone] Route: Oral  
 metoprolol succinate (TOPROL-XL) 25 mg XL tablet 3 Sig: Take 0.5 Tabs by mouth daily.   
 Class: Normal  
 Pharmacy: [address] [place] [address], [facility] AT [address] [place] [address] Ph #: [phone] Route: Oral  
 aspirin delayed-release 81 mg tablet 3 Sig: Take 1 Tab by mouth daily. Class: Normal  
 Pharmacy: [address] [place] [address] Ph #: [phone] Route: Oral  
 losartan (COZAAR) 25 mg tablet 3 Sig: Take 0.5 Tabs by mouth daily. Class: Normal  
 Pharmacy: [address] [place] [address] Ph #: [phone] Route: Oral  
 potassium chloride SR (K-TAB) 20 mEq tablet 3 Sig: Take 1 Tab by mouth daily. Indications: hypokalemia prevention Class: Normal  
 Pharmacy: [address] [place] [address] Ph #: [phone] Route: Oral  
 pravastatin (PRAVACHOL) 40 mg tablet 3 Sig: Take 1 Tab by mouth nightly. Indications: mixed hyperlipidemia Class: Normal  
 Pharmacy: [address] [place] [address] Ph #: [phone] Route: Oral  
  
We Performed the Following AMB POC EKG ROUTINE W/ 12 LEADS, INTER & REP [24295 CPT(R)] MAGNESIUM [id number] CPT(R)] METABOLIC PANEL, BASIC [39509 CPT(R)] REFERRAL TO CARDIAC REHAB [BQU942 Custom] Comments:  
 Please evaluate patient for NSTEMI. To-Do List   
 03/21/2018 ECHO:  2D ECHO COMPLETE ADULT (TTE) W OR WO CONTR Referral Information Referral ID Referred By Referred To  
  
 [id number] Fouzia Palumbo Not Available Visits Status Start Date End Date 1 New Request 3/21/18 3/21/19 If your referral has a status of pending review or denied, additional information will be sent to support the outcome of this decision. Patient Instructions Please reduce your Lasix/furosemide to 40mg once daily in the morning Please reduce your Toprol XL to 12.5mg (1/2 of 25 mg tablet) and begin taking it once daily in the evening Keep weighing yourself daily and keep checking your blood pressure and bring readings to your next visit You may begin low level exercise at this point until your start cardiac rehab Introducing [facility]! [facility] introduces adBrite patient portal. Now you can access parts of your medical record, email your doctor's office, and request medication refills online. 1. In your internet browser, go to [email or web address] 2. Click on the First Time User? Click Here link in the Sign In box. You will see the New Member Sign Up page. 3. Enter your adBrite Access Code exactly as it appears below. You will not need to use this code after youve completed the sign-up process. If you do not sign up before the expiration date, you must request a new code. · adBrite Access Code: [email or web address] Expires: 5/27/2018  1:34 PM 
 
4. Enter the last four digits of your Social Security Number (xxxx) and Date of Birth (mm/dd/yyyy) as indicated and click Submit. You will be taken to the next sign-up page. 5. Create a adBrite ID. This will be your adBrite login ID and cannot be changed, so think of one that is secure and easy to remember. 6. Create a adBrite password. You can change your password at any time. 7. Enter your Password Reset Question and Answer. This can be used at a later time if you forget your password. 8. Enter your e-mail address. You will receive e-mail notification when new information is available in [address] 9. Click Sign Up. You can now view and download portions of your medical record. 10. Click the Download Summary menu link to download a portable copy of your medical information. If you have questions, please visit the Frequently Asked Questions section of the adBrite website. Remember, adBrite is NOT to be used for urgent needs. For medical emergencies, dial 911. Now available from your iPhone and Android! Please provide this summary of care documentation to your next provider. Your primary care clinician is listed as Joan Waller. If you have any questions after today's visit, please call [phone].

## 2018-03-21 NOTE — PATIENT INSTRUCTIONS
Please reduce your Lasix/furosemide to 40mg once daily in the morning  Please reduce your Toprol XL to 12.5mg (1/2 of 25 mg tablet) and begin taking it once daily in the evening  Keep weighing yourself daily and keep checking your blood pressure and bring readings to your next visit  You may begin low level exercise at this point until your start cardiac rehab

## 2018-03-22 LAB
BUN SERPL-MCNC: 27 MG/DL (ref 8–27)
BUN/CREAT SERPL: 21 (ref 12–28)
CALCIUM SERPL-MCNC: 10.3 MG/DL (ref 8.7–10.3)
CHLORIDE SERPL-SCNC: 93 MMOL/L (ref 96–106)
CO2 SERPL-SCNC: 27 MMOL/L (ref 18–29)
CREAT SERPL-MCNC: 1.29 MG/DL (ref 0.57–1)
GFR SERPLBLD CREATININE-BSD FMLA CKD-EPI: 42 ML/MIN/1.73
GFR SERPLBLD CREATININE-BSD FMLA CKD-EPI: 48 ML/MIN/1.73
GLUCOSE SERPL-MCNC: 163 MG/DL (ref 65–99)
INTERPRETATION: NORMAL
MAGNESIUM SERPL-MCNC: 2.3 MG/DL (ref 1.6–2.3)
POTASSIUM SERPL-SCNC: 3.7 MMOL/L (ref 3.5–5.2)
SODIUM SERPL-SCNC: 137 MMOL/L (ref 134–144)

## 2018-03-23 ENCOUNTER — TELEPHONE (OUTPATIENT)
Dept: CARDIOLOGY CLINIC | Age: 73
End: 2018-03-23

## 2018-03-23 NOTE — TELEPHONE ENCOUNTER
----- Message from Mamie Navas NP sent at 3/23/2018 11:36 AM EDT -----  Labs ok, K is a little low but will leave KCL dose the same since Lasix dose was just decreased

## 2018-03-26 NOTE — TELEPHONE ENCOUNTER
Returned call to patient. Verified identity.  Informed her of the following lad results per Todd Perez. NP.

## 2018-03-29 ENCOUNTER — TELEPHONE (OUTPATIENT)
Dept: CARDIAC REHAB | Age: 73
End: 2018-03-29

## 2018-03-29 NOTE — TELEPHONE ENCOUNTER
3/29/2018 Cardiac Rehab: Called Ms. Lo Coronado  to discuss participation in the Cardiac Rehab Program following nstemi on Feb. 2018. Left voicemail message.  Joseph Biggs RN

## 2018-04-02 ENCOUNTER — CLINICAL SUPPORT (OUTPATIENT)
Dept: CARDIOLOGY CLINIC | Age: 73
End: 2018-04-02

## 2018-04-02 ENCOUNTER — OFFICE VISIT (OUTPATIENT)
Dept: CARDIOLOGY CLINIC | Age: 73
End: 2018-04-02

## 2018-04-02 VITALS
HEIGHT: 63 IN | WEIGHT: 169 LBS | SYSTOLIC BLOOD PRESSURE: 140 MMHG | HEART RATE: 66 BPM | RESPIRATION RATE: 16 BRPM | DIASTOLIC BLOOD PRESSURE: 80 MMHG | BODY MASS INDEX: 29.95 KG/M2

## 2018-04-02 DIAGNOSIS — I50.22 CHRONIC SYSTOLIC CONGESTIVE HEART FAILURE (HCC): ICD-10-CM

## 2018-04-02 DIAGNOSIS — I10 ESSENTIAL HYPERTENSION: ICD-10-CM

## 2018-04-02 DIAGNOSIS — I25.10 CORONARY ARTERY DISEASE INVOLVING NATIVE CORONARY ARTERY OF NATIVE HEART WITHOUT ANGINA PECTORIS: ICD-10-CM

## 2018-04-02 DIAGNOSIS — I50.22 CHRONIC SYSTOLIC CONGESTIVE HEART FAILURE (HCC): Primary | ICD-10-CM

## 2018-04-02 NOTE — MR AVS SNAPSHOT
[address] [facility] [address] 
[phone] Patient: Valraie Russell MRN: [id number] GREGORY:[birth year] Visit Information Date & Time Provider Department Dept. Phone Encounter #  
 4/2/2018  1:20 PM Jayme Rocha, [address] [phone] [id number] Your Appointments 4/17/2018  8:20 AM  
ESTABLISHED PATIENT with Trista Lemons MD  
[facility] ([address]) Appt Note: appt reqd by Ellen Renee w/Dr. Aaliyah Castro for CHF kmr [address] [facility],[address] [facility],[address] [address] Upcoming Health Maintenance Date Due Hepatitis C Screening 1945 DTaP/Tdap/Td series (1 - Tdap) 8/16/1967 BREAST CANCER SCRN MAMMOGRAM 8/16/1996 FOBT Q 1 YEAR AGE 50-75 8/16/1996 ZOSTER VACCINE AGE 60> 6/16/2006 GLAUCOMA SCREENING Q2Y 8/16/2011 Bone Densitometry (Dexa) Screening 8/16/2011 Pneumococcal 65+ Low/Medium Risk (1 of 2 - PCV13) 8/16/2011 Influenza Age 5 to Adult 8/1/2017 MEDICARE YEARLY EXAM 3/14/2018 Allergies as of 4/2/2018  Review Complete On: 4/2/2018 By: Yasmany Padilla Severity Noted Reaction Type Reactions Adhesive Tape-silicones  08/97/4686    Rash Current Immunizations  Reviewed on 2/27/2018 No immunizations on file. Not reviewed this visit You Were Diagnosed With   
  
 Codes Comments Chronic systolic congestive heart failure (HCC)    -  Primary ICD-10-CM: O60.43 ICD-9-CM: 428.22, 428.0 Coronary artery disease involving native coronary artery of native heart without angina pectoris     ICD-10-CM: I25.10 ICD-9-CM: 414.01 Vitals  BP Pulse Resp Height(growth percentile) Weight(growth percentile) BMI  
 140/80 (BP 1 Location: Right arm, BP Patient Position: Sitting) 66 16 5' 3\" (1.6 m) 169 lb (76.7 kg) 29.94 kg/m2 Smoking Status Never Smoker Vitals History BMI and BSA Data Body Mass Index Body Surface Area  
 29.94 kg/m 2 1.85 m 2 Preferred Pharmacy [facility] Name Phone [id number] [place] [place] [phone] Your Updated Medication List  
  
   
This list is accurate as of 4/2/18  1:47 PM.  Always use your most recent med list.  
  
  
  
  
 aspirin delayed-release 81 mg tablet Take 1 Tab by mouth daily. FISH OIL PO Take  by mouth daily. furosemide 40 mg tablet Commonly known as:  LASIX Take 1 Tab by mouth daily. losartan 25 mg tablet Commonly known as:  COZAAR Take 0.5 Tabs by mouth daily. metoprolol succinate 25 mg XL tablet Commonly known as:  TOPROL-XL Take 0.5 Tabs by mouth daily. multivitamin tablet Commonly known as:  ONE A DAY Take 1 Tab by mouth daily. potassium chloride SR 20 mEq tablet Commonly known as:  K-TAB Take 1 Tab by mouth daily. Indications: hypokalemia prevention  
  
 pravastatin 40 mg tablet Commonly known as:  PRAVACHOL Take 1 Tab by mouth nightly. Indications: mixed hyperlipidemia To-Do List   
 04/26/2018 9:00 AM  
  Appointment with Karma Conrad RN at [address] [place] [address] ([phone]) Patient Instructions Please start taking your Metoprolol in the morning, go home and take it today and then don't take it tonight Tomorrow start taking it in the morning Please bring your blood pressure monitor in to the office this week to have it checked by nursing staff On the morning you bring your monitor to the office don't take your medications that morning Introducing [facility]! [employer] introduces Verosee patient portal. Now you can access parts of your medical record, email your doctor's office, and request medication refills online. 1. In your internet browser, go to [email or web address] 2. Click on the First Time User? Click Here link in the Sign In box. You will see the New Member Sign Up page. 3. Enter your 5 CUPS and some sugar Access Code exactly as it appears below. You will not need to use this code after youve completed the sign-up process. If you do not sign up before the expiration date, you must request a new code. · 5 CU[employer] and some sugar Access Code: [email or web address] Expires: 5/27/2018  1:34 PM 
 
4. Enter the last four digits of your Social Security Number (xxxx) and Date of Birth (mm/dd/yyyy) as indicated and click Submit. You will be taken to the next sign-up page. 5. Create a Matter and Formt ID. This will be your 5 CUPS and some sugar login ID and cannot be changed, so think of one that is secure and easy to remember. 6. Create a 5 CUPS and some sugar password. You can change your password at any time. 7. Enter your Password Reset Question and Answer. This can be used at a later time if you forget your password. 8. Enter your e-mail address. You will receive e-mail notification when new information is available in [address] 9. Click Sign Up. You can now view and download portions of your medical record. 10. Click the Download Summary menu link to download a portable copy of your medical information. If you have questions, please visit the Frequently Asked Questions section of the 5 CU[employer] and some sugar website. Remember, 5 CUPS and some sugar is NOT to be used for urgent needs. For medical emergencies, dial 911. Now available from your iPhone and Android! Please provide this summary of care documentation to your next provider. Your primary care clinician is listed as Taran Mansfield. If you have any questions after today's visit, please call [phone].

## 2018-04-02 NOTE — PATIENT INSTRUCTIONS
Please start taking your Metoprolol in the morning, go home and take it today and then don't take it tonight  Tomorrow start taking it in the morning   Please bring your blood pressure monitor in to the office this week to have it checked by nursing staff  On the morning you bring your monitor to the office don't take your medications that morning

## 2018-04-02 NOTE — PROGRESS NOTES
[facility]  [phone]    HPI: Klever Ochoa, a 71y.o. year-old who presents for follow up regarding her CHF. Prior to her last visit her blood pressures had been low on her home readings so her lasix was decreased to 40mg daily and her Toprol XL was decreased to 12.5mg daily  Today she continues to have low readings at home (SBP in the 80s) - BP log scanned into [facility]  Her BP tends to normalize in the afternoons and evening and she takes her losartan in the morning and her Toprol XL in the evening  She denies any dizziness or lightheadedness  She denies any syncope  Her LE edema has been minimal at home  She denies any chest pain or palpitations  No dyspnea with exertion or orthopnea  She is walking daily for exercise and plans to start cardiac rehab 4/26/18  She is supposed to have labs drawn for Dr. Lukas Georges before her next visit with Dr. Julio Walker   Asked her to ask Dr. Albino Meadows office to send a copy of her lab results to us for review  Had TTE today - LVEF 25--30% today, discussed findings with her and her friend today     Assessment/Plan:  1.  Acute respiratory failure - secondary to pulmonary edema, CHF, NSTEMI, improved with diuresis   2.  NSTEMI - troponin peaked at 2.4 and trended down, 12 lead ECG with NSR with LBBB, cardiac cath revealed significant RCA disease with collaterals and other non-obstructive CAD, no PCI needed, managing CAD medically, starting cardiac rehab 4/26/18  -continue ASA 81mg daily, Toprol XL, pravastatin 40mg at bedtime  3.  Dyslipidemia - , , , HDL 76, started on pravastatin 40mg at bedtime during admission, takes Fish Oil, will need fasting lipids checked at her next visit if not checked by Dr. Lukas Georges  4.   HTN - having some hypotension at home but elevated in the office, already had lasix and Toprol XL doses decreased at the last visit  -advised her to start taking Toprol XL in the morning  -asked her to bring BP monitor to office to check its accuracy  -she will continue checking BP at home and bring log of readings with her to her next appointment with Dr. Ria Frank in 2 weeks  5.  DM Type 2 - Hgb A1C 6.4%, management per PCP  6.  Elevated TSH 5.3 - but T4 ok at 5.8, having TFTs rechecked by Dr. Ying Lowe per patient   7.  LBBB - since 2/18   8.  Hypokalemia - on KCL 20meq PO daily, having repeat labs with Dr. Ying Lowe soon per patient     9.  GARCIA - creatinine 1.4 on admission and 1.3 at discharge, this may be her baseline with chronic renal insufficiency, having repeat labs with Dr. Ying Lowe soon    10. Acute systolic heart failure - LVEF 15-20% by initial TTE 2/18, TTE today showed LVEF 25-30%, NYHA Class II   -continue Toprol XL 12.5mg daily, losartan 12.5mg dialy and Lasix 40mg daily  -has follow up with Dr. Ria Frank in 2 weeks      Fam Hx: + early CAD, father passed from [address] at age 62, mother passed at age 80 after MI with CHF, two brothers with CABG (age unknown)  Soc Hx: no tobacco use, no etoh use     Cardiac Cath 2/18 - Normal LM and LAD, 30% proximal LCX, RCA proximal 80% then mid 100%, fills well distally via left to right collaterals. LVEF 20%. Echo 2/18 - LV mildly dilated. LVEF 15-20%, severe diffuse hypokinesis, wall thickness was mildly increased, mild to mod MR, mild TR    She  has a past medical history of CAD (coronary artery disease) and Congestive heart failure ([address] Cardiovascular ROS: no chest pain or dyspnea on exertion  Respiratory ROS: no cough, shortness of breath, or wheezing  Neurological ROS: no TIA or stroke symptoms  All other systems negative except as above. PE  Vitals:    04/02/18 1308   BP: 140/80   Pulse: 66   Resp: 16   Weight: 169 lb (76.7 kg)   Height: 5' 3\" (1.6 m)    Body mass index is 29.94 kg/(m^2).    General appearance - alert, well appearing, and in no distress  Mental status - affect appropriate to mood  Eyes - sclera anicteric, moist mucous membranes  Neck - supple  Lymphatics - not assessed  Chest - clear to auscultation, no wheezes, rales or rhonchi  Heart - normal rate, regular rhythm, normal S1, S2, no murmurs, rubs, clicks or gallops  Abdomen - soft, nontender, nondistended, no masses or organomegaly  Back exam - full range of motion, no tenderness  Neurological - cranial nerves II through XII grossly intact, no focal deficit  Musculoskeletal - no muscular tenderness noted, normal strength  Extremities - peripheral pulses normal, trace LE edema    Skin - normal coloration  no rashes    Recent Labs:  Lab Results   Component Value Date/Time    Cholesterol, total 286 (H) 02/26/2018 01:27 AM    HDL Cholesterol 76 02/26/2018 01:27 AM    LDL, calculated 185.4 (H) 02/26/2018 01:27 AM    Triglyceride 123 02/26/2018 01:27 AM    CHOL/HDL Ratio 3.8 02/26/2018 01:27 AM     Lab Results   Component Value Date/Time    Creatinine 1.29 (H) 03/21/2018 11:01 AM     Lab Results   Component Value Date/Time    BUN 27 03/21/2018 11:01 AM    BUN (POC) 21 (H) 02/26/2018 01:50 AM     Lab Results   Component Value Date/Time    Potassium 3.7 03/21/2018 11:01 AM     Lab Results   Component Value Date/Time    Hemoglobin A1c 6.4 (H) 02/26/2018 01:27 AM     Lab Results   Component Value Date/Time    HGB 13.0 02/27/2018 06:57 AM     Lab Results   Component Value Date/Time    PLATELET 302 09/45/2230 06:57 AM       Reviewed:  Past Medical History:   Diagnosis Date    CAD (coronary artery disease)     Congestive heart failure (HCC)      History   Smoking Status    Never Smoker   Smokeless Tobacco    Never Used     History   Alcohol Use No     Allergies   Allergen Reactions    Adhesive Tape-Silicones Rash       Current Outpatient Prescriptions   Medication Sig    furosemide (LASIX) 40 mg tablet Take 1 Tab by mouth daily.  metoprolol succinate (TOPROL-XL) 25 mg XL tablet Take 0.5 Tabs by mouth daily.  aspirin delayed-release 81 mg tablet Take 1 Tab by mouth daily.     losartan (COZAAR) 25 mg tablet Take 0.5 Tabs by mouth daily.  potassium chloride SR (K-TAB) 20 mEq tablet Take 1 Tab by mouth daily. Indications: hypokalemia prevention    pravastatin (PRAVACHOL) 40 mg tablet Take 1 Tab by mouth nightly. Indications: mixed hyperlipidemia    DOCOSAHEXANOIC ACID/EPA (FISH OIL PO) Take  by mouth daily.  multivitamin (ONE A DAY) tablet Take 1 Tab by mouth daily. No current facility-administered medications for this visit.         Melissa Weaver NP  [facility] [address] [place] [address] [address], [address] [facility] [address]  [place]  [phone]

## 2018-04-17 ENCOUNTER — OFFICE VISIT (OUTPATIENT)
Dept: CARDIOLOGY CLINIC | Age: 73
End: 2018-04-17

## 2018-04-17 VITALS
HEART RATE: 64 BPM | BODY MASS INDEX: 29.38 KG/M2 | DIASTOLIC BLOOD PRESSURE: 90 MMHG | HEIGHT: 63 IN | RESPIRATION RATE: 16 BRPM | SYSTOLIC BLOOD PRESSURE: 140 MMHG | WEIGHT: 165.8 LBS

## 2018-04-17 DIAGNOSIS — I44.7 LBBB (LEFT BUNDLE BRANCH BLOCK): ICD-10-CM

## 2018-04-17 DIAGNOSIS — R73.03 PRE-DIABETES: ICD-10-CM

## 2018-04-17 DIAGNOSIS — I25.10 CORONARY ARTERY DISEASE INVOLVING NATIVE CORONARY ARTERY OF NATIVE HEART WITHOUT ANGINA PECTORIS: ICD-10-CM

## 2018-04-17 DIAGNOSIS — I10 HYPERTENSION, UNSPECIFIED TYPE: Primary | ICD-10-CM

## 2018-04-17 DIAGNOSIS — E11.59 TYPE 2 DIABETES MELLITUS WITH OTHER CIRCULATORY COMPLICATION, WITHOUT LONG-TERM CURRENT USE OF INSULIN (HCC): ICD-10-CM

## 2018-04-17 DIAGNOSIS — E87.6 HYPOKALEMIA: ICD-10-CM

## 2018-04-17 DIAGNOSIS — I50.21 ACUTE SYSTOLIC HEART FAILURE (HCC): ICD-10-CM

## 2018-04-17 DIAGNOSIS — I21.4 NSTEMI (NON-ST ELEVATED MYOCARDIAL INFARCTION) (HCC): ICD-10-CM

## 2018-04-17 DIAGNOSIS — E78.00 HYPERCHOLESTEREMIA: ICD-10-CM

## 2018-04-17 DIAGNOSIS — E78.5 DYSLIPIDEMIA, GOAL LDL BELOW 70: ICD-10-CM

## 2018-04-17 PROBLEM — E11.9 DIABETES MELLITUS (HCC): Status: ACTIVE | Noted: 2018-04-17

## 2018-04-17 NOTE — PROGRESS NOTES
[facility] [address] [place] [address]  [phone]    HPI: Zari Melo, a 71y.o. year-old who presents for follow up regarding her CHF. BP up a bit, thinks it is because she does not like being here, her logs are better, normal at home. She was having a lot of low Bp at home and thinks it was positional- raised arm at home. BP at home now 100-120/70s at home. She will go back to 1/2 tab of toprol Xl at night. She is walking on the treadmill at home every night. 30 minutes. She denies any dizziness or lightheadedness, syncope  Her LE edema has been minimal at home  She denies any chest pain or palpitations  No dyspnea with exertion or orthopnea  She is walking daily for exercise and plans to start cardiac rehab 4/26/18    Asked her to ask Dr. Lawrence Salcedo office to send a copy of her lab results to us for review, also of her testing ? PFT? Also had NICOLE  Had TTE last - LVEF 25--30%, discussed findings with her and her friend today. Here with her sister who is taking notes today. Recheck EF in 6 weeks and ?ICD implant consideration if needed. She is agreeable but nervous. Irregular heartbeats today with anxiety over ICD discussion. Patricia Jackson for now. Assessment/Plan:  1.  Acute respiratory failure - secondary to pulmonary edema, CHF, NSTEMI, improved with diuresis   2.  NSTEMI - troponin peaked at 2.4 and trended down, 12 lead ECG with NSR with LBBB, cardiac cath revealed significant RCA disease with collaterals and other non-obstructive CAD, no PCI needed, managing CAD medically, starting cardiac rehab 4/26/18  -continue ASA 81mg daily, Toprol XL, pravastatin 40mg at bedtime  3.  Dyslipidemia - , , , HDL 76, started on pravastatin 40mg at bedtime during admission, takes Fish Oil, will need fasting lipids checked at her next visit if not checked by Dr. Ange Mercado  4.   HTN - as above, stable now  5.  DM Type 2 - Hgb A1C 6.4%, management per PCP  6.  Elevated TSH 5.3 - but T4 ok at 5.8, having TFTs rechecked, due  7.  LBBB - since 2/18   8.  Hypokalemia - on KCL 20meq PO daily   9.  GARCIA - creatinine 1.4 baseline  10. Acute systolic heart failure - LVEF 15-20% by initial TTE 2/18, TTE today showed LVEF 25-30%, NYHA Class II   -continue Toprol XL 12.5mg daily, losartan 12.5mg dialy and Lasix 40mg daily    Fam Hx: + early CAD, father passed from MI at age 62, mother passed at age 80 after MI with CHF, two brothers with CABG (age unknown)  Soc Hx: no tobacco use, no etoh use     Cardiac Cath 2/18 - Normal LM and LAD, 30% proximal LCX, RCA proximal 80% then mid 100%, fills well distally via left to right collaterals. LVEF 20%. Echo 2/18 - LV mildly dilated. LVEF 15-20%, severe diffuse hypokinesis, wall thickness was mildly increased, mild to mod MR, mild TR    She  has a past medical history of CAD (coronary artery disease) and Congestive heart failure ([address] Cardiovascular ROS: no chest pain or dyspnea on exertion  Respiratory ROS: no cough, shortness of breath, or wheezing  Neurological ROS: no TIA or stroke symptoms  All other systems negative except as above. PE  Vitals:    04/17/18 0831   Resp: 16   Weight: 165 lb 12.8 oz (75.2 kg)   Height: 5' 3\" (1.6 m)    Body mass index is 29.37 kg/(m^2).    General appearance - alert, well appearing, and in no distress  Mental status - affect appropriate to mood  Eyes - sclera anicteric, moist mucous membranes  Neck - supple  Lymphatics - not assessed  Chest - clear to auscultation, no wheezes, rales or rhonchi  Heart - normal rate, regular rhythm, normal S1, S2, no murmurs, rubs, clicks or gallops  Abdomen - soft, nontender, nondistended, no masses or organomegaly  Back exam - full range of motion, no tenderness  Neurological - cranial nerves II through XII grossly intact, no focal deficit  Musculoskeletal - no muscular tenderness noted, normal strength  Extremities - peripheral pulses normal, trace LE edema    Skin - normal coloration  no rashes    Recent Labs:  Lab Results   Component Value Date/Time    Cholesterol, total 286 (H) 02/26/2018 01:27 AM    HDL Cholesterol 76 02/26/2018 01:27 AM    LDL, calculated 185.4 (H) 02/26/2018 01:27 AM    Triglyceride 123 02/26/2018 01:27 AM    CHOL/HDL Ratio 3.8 02/26/2018 01:27 AM     Lab Results   Component Value Date/Time    Creatinine 1.29 (H) 03/21/2018 11:01 AM     Lab Results   Component Value Date/Time    BUN 27 03/21/2018 11:01 AM    BUN (POC) 21 (H) 02/26/2018 01:50 AM     Lab Results   Component Value Date/Time    Potassium 3.7 03/21/2018 11:01 AM     Lab Results   Component Value Date/Time    Hemoglobin A1c 6.4 (H) 02/26/2018 01:27 AM     Lab Results   Component Value Date/Time    HGB 13.0 02/27/2018 06:57 AM     Lab Results   Component Value Date/Time    PLATELET 411 45/59/7982 06:57 AM       Reviewed:  Past Medical History:   Diagnosis Date    CAD (coronary artery disease)     Congestive heart failure (HCC)      History   Smoking Status    Never Smoker   Smokeless Tobacco    Never Used     History   Alcohol Use No     Allergies   Allergen Reactions    Adhesive Tape-Silicones Rash       Current Outpatient Prescriptions   Medication Sig    furosemide (LASIX) 40 mg tablet Take 1 Tab by mouth daily.  metoprolol succinate (TOPROL-XL) 25 mg XL tablet Take 0.5 Tabs by mouth daily.  aspirin delayed-release 81 mg tablet Take 1 Tab by mouth daily.  losartan (COZAAR) 25 mg tablet Take 0.5 Tabs by mouth daily.  potassium chloride SR (K-TAB) 20 mEq tablet Take 1 Tab by mouth daily. Indications: hypokalemia prevention    pravastatin (PRAVACHOL) 40 mg tablet Take 1 Tab by mouth nightly. Indications: mixed hyperlipidemia    DOCOSAHEXANOIC ACID/EPA (FISH OIL PO) Take  by mouth daily.  multivitamin (ONE A DAY) tablet Take 1 Tab by mouth daily. No current facility-administered medications for this visit.         Chanel Stokes MD  [facility] [address] [place] [address] [address]  [phone]

## 2018-04-17 NOTE — MR AVS SNAPSHOT
[address] [place] 
[phone] Patient: Fam Garcia MRN: [id number] TONIO:0/61/1238 Visit Information Date & Time Provider Department Dept. Phone Encounter #  
 4/17/2018  8:20 AM Gera Goncalves MD [facility] Jose Tate [phone] [id number] Your Appointments 5/30/2018  8:00 AM  
ECHO CARDIOGRAMS 2D with ECHO, [facility] (JOYCE SCHEDULING) Appt Note: echo for HF 8:00 Dr. Jaramillo Case 8:40 [address] [place]  [address] [place] [address]  
[address] [address] Upcoming Health Maintenance Date Due Hepatitis C Screening 1945 DTaP/Tdap/Td series (1 - Tdap) 8/16/1967 BREAST CANCER SCRN MAMMOGRAM 8/16/1996 FOBT Q 1 YEAR AGE 50-75 8/16/1996 ZOSTER VACCINE AGE 60> 6/16/2006 GLAUCOMA SCREENING Q2Y 8/16/2011 Bone Densitometry (Dexa) Screening 8/16/2011 Pneumococcal 65+ Low/Medium Risk (1 of 2 - PCV13) 8/16/2011 Influenza Age 5 to Adult 8/1/2017 MEDICARE YEARLY EXAM 3/14/2018 Allergies as of 4/17/2018  Review Complete On: 4/17/2018 By: Michelle Rudd Severity Noted Reaction Type Reactions Adhesive Tape-silicones  99/23/9303    Rash Current Immunizations  Reviewed on 2/27/2018 No immunizations on file. Not reviewed this visit You Were Diagnosed With   
  
 Codes Comments Hypertension, unspecified type    -  Primary ICD-10-CM: I10 
ICD-9-CM: 401.9 Hypercholesteremia     ICD-10-CM: E78.00 ICD-9-CM: 272.0 Acute systolic heart failure (HCC)     ICD-10-CM: I50.21 ICD-9-CM: 428.21   
 NSTEMI (non-ST elevated myocardial infarction) ([facility] [address]     ICD-10-CM: I21.4 ICD-9-CM: 410.70 Hypokalemia     ICD-10-CM: E87.6 ICD-9-CM: 276.8 Pre-diabetes     ICD-10-CM: R73.03 
ICD-9-CM: 790.29 Coronary artery disease involving native coronary artery of native heart without angina pectoris     ICD-10-CM: I25.10 ICD-9-CM: 414.01 Dyslipidemia, goal LDL below 70     ICD-10-CM: E78.5 ICD-9-CM: 272.4 LBBB (left bundle branch block)     ICD-10-CM: I44.7 ICD-9-CM: 426. 3 Type 2 diabetes mellitus with other circulatory complication, without long-term current use of insulin (HCC)     ICD-10-CM: E11.59 
ICD-9-CM: 250.70 Vitals BP Pulse Resp Height(growth percentile) Weight(growth percentile) BMI  
 140/90 (BP 1 Location: Left arm, BP Patient Position: Sitting) 64 16 5' 3\" (1.6 m) 165 lb 12.8 oz (75.2 kg) 29.37 kg/m2 Smoking Status Never Smoker Vitals History BMI and BSA Data Body Mass Index Body Surface Area  
 29.37 kg/m 2 1.83 m 2 Preferred Pharmacy Pharmacy Name Phone [id number] [place], [place] [phone] Your Updated Medication List  
  
   
This list is accurate as of 4/17/18  8:51 AM.  Always use your most recent med list.  
  
  
  
  
 aspirin delayed-release 81 mg tablet Take 1 Tab by mouth daily. FISH OIL PO Take  by mouth daily. furosemide 40 mg tablet Commonly known as:  LASIX Take 1 Tab by mouth daily. losartan 25 mg tablet Commonly known as:  COZAAR Take 0.5 Tabs by mouth daily. metoprolol succinate 25 mg XL tablet Commonly known as:  TOPROL-XL Take 0.5 Tabs by mouth daily. multivitamin tablet Commonly known as:  ONE A DAY Take 1 Tab by mouth daily. potassium chloride SR 20 mEq tablet Commonly known as:  K-TAB Take 1 Tab by mouth daily. Indications: hypokalemia prevention  
  
 pravastatin 40 mg tablet Commonly known as:  PRAVACHOL Take 1 Tab by mouth nightly. Indications: mixed hyperlipidemia To-Do List   
 04/26/2018 9:00 AM  
 Appointment with Nicola Brush RN at [address] [place] [address] ([phone]) Introducing [facility]! [facility] introduces OneDoc patient portal. Now you can access parts of your medical record, email your doctor's office, and request medication refills online. 1. In your internet browser, go to [email or web address] 2. Click on the First Time User? Click Here link in the Sign In box. You will see the New Member Sign Up page. 3. Enter your OneDoc Access Code exactly as it appears below. You will not need to use this code after youve completed the sign-up process. If you do not sign up before the expiration date, you must request a new code. · OneDoc Access Code: [email or web address] Expires: 5/27/2018  1:34 PM 
 
4. Enter the last four digits of your Social Security Number (xxxx) and Date of Birth (mm/dd/yyyy) as indicated and click Submit. You will be taken to the next sign-up page. 5. Create a OneDoc ID. This will be your OneDoc login ID and cannot be changed, so think of one that is secure and easy to remember. 6. Create a OneDoc password. You can change your password at any time. 7. Enter your Password Reset Question and Answer. This can be used at a later time if you forget your password. 8. Enter your e-mail address. You will receive e-mail notification when new information is available in [address] 9. Click Sign Up. You can now view and download portions of your medical record. 10. Click the Download Summary menu link to download a portable copy of your medical information. If you have questions, please visit the Frequently Asked Questions section of the OneDoc website. Remember, OneDoc is NOT to be used for urgent needs. For medical emergencies, dial 911. Now available from your iPhone and Android! Please provide this summary of care documentation to your next provider. Your primary care clinician is listed as Taran Mansfield. If you have any questions after today's visit, please call [phone].

## 2018-04-24 ENCOUNTER — TELEPHONE (OUTPATIENT)
Dept: CARDIAC REHAB | Age: 73
End: 2018-04-24

## 2018-04-24 NOTE — TELEPHONE ENCOUNTER
4/24/2018 Cardiac Rehab: Called Ms. Tim Lewis to remind of intake appointment on Thursday, April 26, 2018. Confirmed appointment with patient. Provided patient with contact information for [facility] [place]. Also, reminded patient to bring a list of current medications, a personal schedule, and to wear comfortable clothes and shoes.  Swati Fried

## 2018-04-26 ENCOUNTER — HOSPITAL ENCOUNTER (OUTPATIENT)
Dept: CARDIAC REHAB | Age: 73
Discharge: HOME | End: 2018-04-26
Payer: MEDICARE

## 2018-04-26 VITALS — WEIGHT: 167.5 LBS | HEIGHT: 63 IN | BODY MASS INDEX: 29.68 KG/M2

## 2018-04-26 VITALS — BODY MASS INDEX: 29.67 KG/M2 | WEIGHT: 167.5 LBS

## 2018-04-26 PROCEDURE — 93798 PHYS/QHP OP CAR RHAB W/ECG: CPT

## 2018-04-26 NOTE — CARDIO/PULMONARY
Rojas Prieto  71 y.o. presented to cardiac wellness for orientation and exercise tolerance test today with a primary diagnosis of NSTEMI and SHF, 02/26/18. Cardiac cath showed significant RCA disease with good collaterals, no PCI needed. Her EF is 30-35%. Her next echo is scheduled for 05/30/18. Rojas Prieto has no previous cardiac history however, she has not been to see a doctor in 6 years since her husbands death. Cardiac risk factors include family history, dyslipidemia, hypertension and pre-DM, these were reviewed with Yen Lux. Rojas Prieto is a  and lives alone. She has 3 grown daughters and 11 grandchildren. PHQ-9 depression score, is 1 and this is considered normal.  Patient denied chest pain or SOB during 6 minute walk and was in SR/ST with 1 PVC and rare to occational PACs. Rojas Prieto will attend a 60 minute class once a week and exercise 2 days a week in cardiac wellness. Education manual given and nutrition, stress management, medications, CHF and exercise were reviewed briefly. Americajose Jose J has been weighing herself daily, restricting her sodium and is aware of sx and symptoms of HF and when to call to her doctor.   Betzaida Dubois, RN  4/26/2018

## 2018-05-01 ENCOUNTER — HOSPITAL ENCOUNTER (OUTPATIENT)
Dept: CARDIAC REHAB | Age: 73
Discharge: HOME | End: 2018-05-01
Payer: MEDICARE

## 2018-05-01 VITALS — BODY MASS INDEX: 29.23 KG/M2 | WEIGHT: 165 LBS

## 2018-05-01 PROCEDURE — 93798 PHYS/QHP OP CAR RHAB W/ECG: CPT

## 2018-05-04 ENCOUNTER — HOSPITAL ENCOUNTER (OUTPATIENT)
Dept: CARDIAC REHAB | Age: 73
Discharge: HOME | End: 2018-05-04
Payer: MEDICARE

## 2018-05-04 VITALS — BODY MASS INDEX: 29.23 KG/M2 | WEIGHT: 165 LBS

## 2018-05-04 PROCEDURE — 93798 PHYS/QHP OP CAR RHAB W/ECG: CPT

## 2018-05-04 NOTE — CARDIO/PULMONARY
Pt reported stress level of 10 - had spoken with her daughter on the way over, who imformed patient that she and her  were getting a divorce. Pt is very close with both of her grandchildren, and was distraught regarding this news. Encouraged patient to focus on maintaining stability for the grandchildren, and to try and practice some stress management techniques located in the education marge.

## 2018-05-08 ENCOUNTER — HOSPITAL ENCOUNTER (OUTPATIENT)
Dept: CARDIAC REHAB | Age: 73
Discharge: HOME | End: 2018-05-08
Payer: MEDICARE

## 2018-05-08 VITALS — WEIGHT: 164 LBS | BODY MASS INDEX: 29.05 KG/M2

## 2018-05-08 PROCEDURE — 93798 PHYS/QHP OP CAR RHAB W/ECG: CPT

## 2018-05-10 ENCOUNTER — HOSPITAL ENCOUNTER (OUTPATIENT)
Dept: CARDIAC REHAB | Age: 73
Discharge: HOME | End: 2018-05-10
Payer: MEDICARE

## 2018-05-10 VITALS — BODY MASS INDEX: 29.23 KG/M2 | WEIGHT: 165 LBS

## 2018-05-10 PROCEDURE — 93798 PHYS/QHP OP CAR RHAB W/ECG: CPT

## 2018-05-10 PROCEDURE — 93797 PHYS/QHP OP CAR RHAB WO ECG: CPT | Performed by: DIETITIAN, REGISTERED

## 2018-05-15 ENCOUNTER — HOSPITAL ENCOUNTER (OUTPATIENT)
Dept: CARDIAC REHAB | Age: 73
Discharge: HOME | End: 2018-05-15
Payer: MEDICARE

## 2018-05-15 VITALS — WEIGHT: 164 LBS | BODY MASS INDEX: 29.05 KG/M2

## 2018-05-15 PROCEDURE — 93798 PHYS/QHP OP CAR RHAB W/ECG: CPT

## 2018-05-17 ENCOUNTER — HOSPITAL ENCOUNTER (OUTPATIENT)
Dept: CARDIAC REHAB | Age: 73
Discharge: HOME | End: 2018-05-17
Payer: MEDICARE

## 2018-05-17 VITALS — BODY MASS INDEX: 28.7 KG/M2 | WEIGHT: 162 LBS

## 2018-05-17 PROCEDURE — 93798 PHYS/QHP OP CAR RHAB W/ECG: CPT | Performed by: DIETITIAN, REGISTERED

## 2018-05-17 PROCEDURE — 93797 PHYS/QHP OP CAR RHAB WO ECG: CPT | Performed by: DIETITIAN, REGISTERED

## 2018-05-22 ENCOUNTER — HOSPITAL ENCOUNTER (OUTPATIENT)
Dept: CARDIAC REHAB | Age: 73
Discharge: HOME | End: 2018-05-22
Payer: MEDICARE

## 2018-05-22 VITALS — BODY MASS INDEX: 29.05 KG/M2 | WEIGHT: 164 LBS

## 2018-05-22 PROCEDURE — 93798 PHYS/QHP OP CAR RHAB W/ECG: CPT

## 2018-05-24 ENCOUNTER — HOSPITAL ENCOUNTER (OUTPATIENT)
Dept: CARDIAC REHAB | Age: 73
Discharge: HOME | End: 2018-05-24
Payer: MEDICARE

## 2018-05-24 VITALS — BODY MASS INDEX: 28.52 KG/M2 | WEIGHT: 161 LBS

## 2018-05-24 PROCEDURE — 93797 PHYS/QHP OP CAR RHAB WO ECG: CPT | Performed by: DIETITIAN, REGISTERED

## 2018-05-24 PROCEDURE — 93798 PHYS/QHP OP CAR RHAB W/ECG: CPT | Performed by: DIETITIAN, REGISTERED

## 2018-05-30 ENCOUNTER — OFFICE VISIT (OUTPATIENT)
Dept: CARDIOLOGY CLINIC | Age: 73
End: 2018-05-30

## 2018-05-30 ENCOUNTER — CLINICAL SUPPORT (OUTPATIENT)
Dept: CARDIOLOGY CLINIC | Age: 73
End: 2018-05-30

## 2018-05-30 VITALS
RESPIRATION RATE: 16 BRPM | HEIGHT: 63 IN | SYSTOLIC BLOOD PRESSURE: 130 MMHG | BODY MASS INDEX: 28.17 KG/M2 | HEART RATE: 70 BPM | WEIGHT: 159 LBS | DIASTOLIC BLOOD PRESSURE: 80 MMHG

## 2018-05-30 DIAGNOSIS — I44.7 LBBB (LEFT BUNDLE BRANCH BLOCK): ICD-10-CM

## 2018-05-30 DIAGNOSIS — I25.10 CORONARY ARTERY DISEASE INVOLVING NATIVE CORONARY ARTERY OF NATIVE HEART WITHOUT ANGINA PECTORIS: ICD-10-CM

## 2018-05-30 DIAGNOSIS — J96.01 ACUTE RESPIRATORY FAILURE WITH HYPOXIA (HCC): ICD-10-CM

## 2018-05-30 DIAGNOSIS — E11.21 TYPE 2 DIABETES WITH NEPHROPATHY (HCC): ICD-10-CM

## 2018-05-30 DIAGNOSIS — I50.20 SYSTOLIC CONGESTIVE HEART FAILURE, UNSPECIFIED HF CHRONICITY (HCC): Primary | ICD-10-CM

## 2018-05-30 DIAGNOSIS — I71.9 AORTIC ANEURYSM WITHOUT RUPTURE, UNSPECIFIED PORTION OF AORTA (HCC): ICD-10-CM

## 2018-05-30 DIAGNOSIS — E78.00 HYPERCHOLESTEREMIA: ICD-10-CM

## 2018-05-30 DIAGNOSIS — I10 HYPERTENSION, UNSPECIFIED TYPE: ICD-10-CM

## 2018-05-30 DIAGNOSIS — I50.21 CHF NYHA CLASS II, ACUTE, SYSTOLIC (HCC): ICD-10-CM

## 2018-05-30 DIAGNOSIS — I50.21 ACUTE SYSTOLIC HEART FAILURE (HCC): ICD-10-CM

## 2018-05-30 DIAGNOSIS — E78.5 DYSLIPIDEMIA, GOAL LDL BELOW 70: ICD-10-CM

## 2018-05-30 DIAGNOSIS — I21.4 NSTEMI (NON-ST ELEVATED MYOCARDIAL INFARCTION) (HCC): Primary | ICD-10-CM

## 2018-05-30 DIAGNOSIS — I34.0 MITRAL VALVE INSUFFICIENCY, UNSPECIFIED ETIOLOGY: ICD-10-CM

## 2018-05-30 RX ORDER — METOPROLOL SUCCINATE 25 MG/1
12.5 TABLET, EXTENDED RELEASE ORAL DAILY
Qty: 45 TAB | Refills: 3 | Status: SHIPPED | OUTPATIENT
Start: 2018-05-30 | End: 2019-01-28

## 2018-05-30 RX ORDER — FUROSEMIDE 40 MG/1
40 TABLET ORAL DAILY
Qty: 90 TAB | Refills: 3 | Status: SHIPPED | OUTPATIENT
Start: 2018-05-30 | End: 2019-06-21 | Stop reason: SDUPTHER

## 2018-05-30 RX ORDER — PRAVASTATIN SODIUM 40 MG/1
40 TABLET ORAL
Qty: 90 TAB | Refills: 3 | Status: SHIPPED | OUTPATIENT
Start: 2018-05-30 | End: 2019-01-15

## 2018-05-30 RX ORDER — LOSARTAN POTASSIUM 25 MG/1
25 TABLET ORAL DAILY
Qty: 4590 TAB | Refills: 3 | Status: SHIPPED | OUTPATIENT
Start: 2018-05-30 | End: 2019-01-15

## 2018-05-30 NOTE — MR AVS SNAPSHOT
[address] [facility] [address] [place] 
[phone] Patient: Mohamud Schwarz MRN: [id number] VCA:[id number]/87/[id number] Visit Information Date & Time Provider Department Dept. Phone Encounter #  
 5/30/2018  8:40 AM Cal Davis MD [facility] Bob Flaherty [phone] [id number] Your Appointments 9/11/2018  8:20 AM  
ESTABLISHED PATIENT with Cal Davis MD  
[facility] ([facility]) Appt Note: Sept f/u per Dr. Shannan Orlando [address]  [address] [place]  
[address] [address] Upcoming Health Maintenance Date Due Hepatitis C Screening 1945 FOOT EXAM Q1 8/16/1956 MICROALBUMIN Q1 8/16/1956 EYE EXAM RETINAL OR DILATED Q1 8/16/1956 DTaP/Tdap/Td series (1 - Tdap) 8/16/1967 BREAST CANCER SCRN MAMMOGRAM 8/16/1996 FOBT Q 1 YEAR AGE 50-75 8/16/1996 GLAUCOMA SCREENING Q2Y 8/16/2011 Bone Densitometry (Dexa) Screening 8/16/2011 Pneumococcal 65+ Low/Medium Risk (1 of 2 - PCV13) 8/16/2011 MEDICARE YEARLY EXAM 3/14/2018 Influenza Age 5 to Adult 8/1/2018 HEMOGLOBIN A1C Q6M 8/26/2018 LIPID PANEL Q1 2/26/2019 Allergies as of 5/30/2018  Review Complete On: 5/30/2018 By: Celine File Severity Noted Reaction Type Reactions Adhesive Tape-silicones  21/82/1143    Rash Current Immunizations  Reviewed on 4/26/2018 Name Date Influenza Vaccine 10/19/2017 Varicella Virus Vaccine 8/14/2012 Not reviewed this visit You Were Diagnosed With   
  
 Codes Comments NSTEMI (non-ST elevated myocardial infarction) ([facility] [address]    -  Primary ICD-10-CM: I21.4 ICD-9-CM: 410.70 Acute systolic heart failure (HCC)     ICD-10-CM: I50.21 ICD-9-CM: 428.21 Hypertension, unspecified type     ICD-10-CM: I10 
ICD-9-CM: 401.9 Hypercholesteremia     ICD-10-CM: E78.00 ICD-9-CM: 272.0 Coronary artery disease involving native coronary artery of native heart without angina pectoris     ICD-10-CM: I25.10 ICD-9-CM: 414.01   
 CHF NYHA class II, acute, systolic (HCC)     EDK-05-AJ: I50.21 ICD-9-CM: 428.21, 428.0 Vitals BP Pulse Resp Height(growth percentile) Weight(growth percentile) BMI  
 130/80 (BP 1 Location: Left arm, BP Patient Position: Sitting) 70 16 5' 3\" (1.6 m) 159 lb (72.1 kg) 28.17 kg/m2 Smoking Status Never Smoker Vitals History BMI and BSA Data Body Mass Index Body Surface Area  
 28.17 kg/m 2 1.79 m 2 Preferred Pharmacy Pharmacy Name Phone [id number] [place], [place] [phone] Your Updated Medication List  
  
   
This list is accurate as of 5/30/18  9:14 AM.  Always use your most recent med list.  
  
  
  
  
 aspirin delayed-release 81 mg tablet Take 1 Tab by mouth daily. FISH OIL PO Take  by mouth daily. furosemide 40 mg tablet Commonly known as:  LASIX Take 1 Tab by mouth daily. losartan 25 mg tablet Commonly known as:  COZAAR Take 1 Tab by mouth daily. metoprolol succinate 25 mg XL tablet Commonly known as:  TOPROL-XL Take 0.5 Tabs by mouth daily. multivitamin tablet Commonly known as:  ONE A DAY Take 1 Tab by mouth daily. potassium chloride SR 20 mEq tablet Commonly known as:  K-TAB Take 1 Tab by mouth daily. Indications: hypokalemia prevention  
  
 pravastatin 40 mg tablet Commonly known as:  PRAVACHOL Take 1 Tab by mouth nightly. Indications: mixed hyperlipidemia Prescriptions Sent to Pharmacy Refills  
 losartan (COZAAR) 25 mg tablet 3 Sig: Take 1 Tab by mouth daily.   
 Class: Normal  
 Pharmacy: [id number] [facility], [address] [facility] AT [address] [place] [address] Ph #: [phone] Route: Oral  
 metoprolol succinate (TOPROL-XL) 25 mg XL tablet 3 Sig: Take 0.5 Tabs by mouth daily. Class: Normal  
 Pharmacy: [address] [place] [address] Ph #: [phone] Route: Oral  
 pravastatin (PRAVACHOL) 40 mg tablet 3 Sig: Take 1 Tab by mouth nightly. Indications: mixed hyperlipidemia Class: Normal  
 Pharmacy: [address] [place] [address] Ph #: [phone] Route: Oral  
 furosemide (LASIX) 40 mg tablet 3 Sig: Take 1 Tab by mouth daily. Class: Normal  
 Pharmacy: [address] [place] [address] Ph #: [phone]  Route: Oral  
  
To-Do List   
 05/31/2018 9:30 AM  
  Appointment with [address] at [address] [place] [address] ([phone])  
  
 05/31/2018 10:30 AM  
  Appointment with Ab Ritchie RD at [address] [place] [address] ([phone])  
  
 05/31/2018 10:30 AM  
  Appointment with [address] at [address] [place] [address] ([phone])  
  
 06/05/2018 9:30 AM  
  Appointment with [address] at [address] [place] [address] ([phone])  
  
 06/07/2018 9:30 AM  
  Appointment with [address] at [address] [place] [address] ([phone])  
  
 06/07/2018 10:30 AM  
  Appointment with Ab R[address] at [address] [place] [address] ([phone])  
  
 06/07/2018 10:30 AM  
  Appointment with [address] at [address] [place] [address] ([phone])  
  
 06/12/2018 9:30 AM  
  Appointment with [address] at [address] [place] [address] ([phone])  
  
 06/14/2018 9:30 AM  
  Appointment with [address] at [address] [place] [address] ([phone])  
  
 06/14/2018 10:30 AM  
  Appointment with Ab R[address] at [address] [place] [address] ([phone])  
  
 06/14/2018 10:30 AM  
 Appointment with [address] at [address] [place] [address] ([phone])  
  
 06/19/2018 9:30 AM  
  Appointment with [address] at [address] [place] [address] ([phone])  
  
 06/21/2018 9:30 AM  
  Appointment with [address] at [address] [place] [address] ([phone])  
  
 06/21/2018 10:30 AM  
  Appointment with Galina Hughes RD at [address] [place] [address] ([phone])  
  
 06/21/2018 10:30 AM  
  Appointment with [address] at [address] [place] [address] ([phone])  
  
 06/26/2018 9:30 AM  
  Appointment with [address] at [address] [place] [address] ([phone])  
  
 06/28/2018 9:30 AM  
  Appointment with [address] at [address] [place] [address] ([phone])  
  
 06/28/2018 10:30 AM  
  Appointment with Galina Hughes RD at [address] [place] [address] ([phone])  
  
 06/28/2018 10:30 AM  
  Appointment with [address] at [address] [place] [address] ([phone])  
  
 07/03/2018 9:30 AM  
  Appointment with [address] at [address] [place] [address] ([phone])  
  
 07/05/2018 9:30 AM  
  Appointment with [address] at [address] [place] [address] ([phone])  
  
 07/05/2018 10:30 AM  
  Appointment with Galina Hughes RD at [address] [place] [address] ([phone])  
  
 07/05/2018 10:30 AM  
  Appointment with [address] at [address] [place] [address] ([phone])  
  
 07/10/2018 9:30 AM  
  Appointment with [address] at [address] [place] [address] ([phone])  
  
 07/12/2018 9:30 AM  
  Appointment with [address] at [address] [place] [address] ([phone])  
  
 07/12/2018 10:30 AM  
  Appointment with [address] at [address] [place] [address] ([phone])  
  
 07/17/2018 9:30 AM  
  Appointment with [address] at [address] [place] [address] ([phone])  
  
 07/19/2018 9:30 AM  
 Appointment with Elina Solano at [address] [place] [address] ([phone])  
  
 07/19/2018 10:30 AM  
  Appointment with John Mujica RD at [address] [place] [address] ([phone])  
  
 07/19/2018 10:30 AM  
  Appointment with Nir Solano at [address] [place] [address] ([phone]) Introducing [facility]! Yelena Stacy introduces Mirantis patient portal. Now you can access parts of your medical record, email your doctor's office, and request medication refills online. 1. In your internet browser, go to [email or web address] 2. Click on the First Time User? Click Here link in the Sign In box. You will see the New Member Sign Up page. 3. Enter your Mirantis Access Code exactly as it appears below. You will not need to use this code after youve completed the sign-up process. If you do not sign up before the expiration date, you must request a new code. · Mirantis Access Code: [email or web address] Expires: 8/26/2018  5:23 AM 
 
4. Enter the last four digits of your Social Security Number (xxxx) and Date of Birth (mm/dd/yyyy) as indicated and click Submit. You will be taken to the next sign-up page. 5. Create a Mirantis ID. This will be your Mirantis login ID and cannot be changed, so think of one that is secure and easy to remember. 6. Create a Mirantis password. You can change your password at any time. 7. Enter your Password Reset Question and Answer. This can be used at a later time if you forget your password. 8. Enter your e-mail address. You will receive e-mail notification when new information is available in [address] 9. Click Sign Up. You can now view and download portions of your medical record. 10. Click the Download Summary menu link to download a portable copy of your medical information. If you have questions, please visit the Frequently Asked Questions section of the Mirantis website.  Remember, Mirantis is NOT to be used for urgent needs. For medical emergencies, dial 911. Now available from your iPhone and Android! Please provide this summary of care documentation to your next provider. Your primary care clinician is listed as Christina Sims. If you have any questions after today's visit, please call [phone].

## 2018-05-30 NOTE — PROGRESS NOTES
[facility]  [phone]    HPI: Red Benjamin, a 71y.o. year-old who presents for follow up regarding her CHF. She is feeling well today, NYHA Class I-II. No swelling. NO palpitations. Discussion of defibrillator. She declines to have it done at this time. And she says that if her heart stops she would not want CPR or resuscitation. BP up a bit, thinks it is because she does not like being here, her logs are better, normal at home. She was having a lot of low Bp at home and thinks it was positional- raised arm at home. BP at home now 100-120/70s at home. She will continue 1/2 tab of toprol Xl at night, [id number] daily  She is walking on the treadmill at home every night. 30 minutes. She denies any dizziness or lightheadedness, syncope  Her LE edema has been minimal at home  She denies any chest pain or palpitations  No dyspnea with exertion or orthopnea, really minimal symptoms    Asked her to ask Dr. Zenobia [place] office to send a copy of her lab results to us for review, also of her testing ? PFT? Also had NICOLE  Had TTE last - LVEF 25--30%, discussed findings with her and her sister who is taking notes today. Declines ICD. Prefers DNR. Assessment/Plan:  1.  Acute respiratory failure - secondary to pulmonary edema, CHF, NSTEMI, improved with diuresis   2.  NSTEMI - troponin peaked at 2.4 and trended down, 12 lead ECG with NSR with LBBB, cardiac cath revealed significant RCA disease with collaterals and other non-obstructive CAD, no PCI needed, managing CAD medically, starting cardiac rehab 4/26/18  -continue ASA 81mg daily, Toprol XL, pravastatin 40mg at bedtime  3.  Dyslipidemia - , , , HDL 76, started on pravastatin 40mg at bedtime during admission, takes Fish Oil, will need fasting lipids checked at her next visit if not checked by Dr. Benito Serrano  4.   HTN - as above, stable now  5.  DM Type 2 - Hgb A1C 6.4%, management per PCP  6.  Elevated TSH 5.3 - but T4 ok at 5.8, having TFTs rechecked, due  7.  LBBB - since 2/18   8.  Hypokalemia - on KCL 20meq PO daily   9.  GARCIA - creatinine 1.4 baseline  10. Acute systolic heart failure - LVEF 15-20% by initial TTE 2/18, TTE today showed LVEF 25-30%, NYHA Class I-II   -continue Toprol XL 12.5mg daily, losartan 25mg dialy and Lasix 40mg daily  -NO ICD at pt pref, finds the idea extremely stressful and causes tears    Fam Hx: + early CAD, father passed from [address] at age 62, mother passed at age 80 after MI with CHF, two brothers with CABG (age unknown)  Soc Hx: no tobacco use, no etoh use     Cardiac Cath 2/18 - Normal LM and LAD, 30% proximal LCX, RCA proximal 80% then mid 100%, fills well distally via left to right collaterals. LVEF 20%. Echo 2/18 - LV mildly dilated. LVEF 15-20%, severe diffuse hypokinesis, wall thickness was mildly increased, mild to mod MR, mild TR    She  has a past medical history of CAD (coronary artery disease) (02/26/2018); Congestive heart failure ([facility] [address] (02/26/2018); Diabetes ([facility] (02/26/2018); and Hypertension. Cardiovascular ROS: no chest pain or dyspnea on exertion  Respiratory ROS: no cough, shortness of breath, or wheezing  Neurological ROS: no TIA or stroke symptoms  All other systems negative except as above. PE  Vitals:    05/30/18 0844   BP: 130/80   Pulse: 70   Resp: 16   Weight: 159 lb (72.1 kg)   Height: 5' 3\" (1.6 m)    Body mass index is 28.17 kg/(m^2).    General appearance - alert, well appearing, and in no distress  Mental status - affect appropriate to mood  Eyes - sclera anicteric, moist mucous membranes  Neck - supple  Lymphatics - not assessed  Chest - clear to auscultation, no wheezes, rales or rhonchi  Heart - normal rate, regular rhythm, normal S1, S2, no murmurs, rubs, clicks or gallops  Abdomen - soft, nontender, nondistended, no masses or organomegaly  Back exam - full range of motion, no tenderness  Neurological - cranial nerves II through XII grossly intact, no focal deficit  Musculoskeletal - no muscular tenderness noted, normal strength  Extremities - peripheral pulses normal, trace LE edema    Skin - normal coloration  no rashes    Recent Labs:  Lab Results   Component Value Date/Time    Cholesterol, total 286 (H) 02/26/2018 01:27 AM    HDL Cholesterol 76 02/26/2018 01:27 AM    LDL, calculated 185.4 (H) 02/26/2018 01:27 AM    Triglyceride 123 02/26/2018 01:27 AM    CHOL/HDL Ratio 3.8 02/26/2018 01:27 AM     Lab Results   Component Value Date/Time    Creatinine 1.29 (H) 03/21/2018 11:01 AM     Lab Results   Component Value Date/Time    BUN 27 03/21/2018 11:01 AM    BUN (POC) 21 (H) 02/26/2018 01:50 AM     Lab Results   Component Value Date/Time    Potassium 3.7 03/21/2018 11:01 AM     Lab Results   Component Value Date/Time    Hemoglobin A1c 6.4 (H) 02/26/2018 01:27 AM     Lab Results   Component Value Date/Time    HGB 13.0 02/27/2018 06:57 AM     Lab Results   Component Value Date/Time    PLATELET 755 13/05/9339 06:57 AM       Reviewed:  Past Medical History:   Diagnosis Date    CAD (coronary artery disease) 02/26/2018    NSTEMI    Congestive heart failure ([facility] 02/26/2018    Diabetes ([facility] 02/26/2018    pre-DM    Hypertension      History   Smoking Status    Never Smoker   Smokeless Tobacco    Never Used     History   Alcohol Use No     Allergies   Allergen Reactions    Adhesive Tape-Silicones Rash       Current Outpatient Prescriptions   Medication Sig    furosemide (LASIX) 40 mg tablet Take 1 Tab by mouth daily.  metoprolol succinate (TOPROL-XL) 25 mg XL tablet Take 0.5 Tabs by mouth daily.  aspirin delayed-release 81 mg tablet Take 1 Tab by mouth daily.  losartan (COZAAR) 25 mg tablet Take 0.5 Tabs by mouth daily. (Patient taking differently: Take 25 mg by mouth daily.)    potassium chloride SR (K-TAB) 20 mEq tablet Take 1 Tab by mouth daily. Indications: hypokalemia prevention    pravastatin (PRAVACHOL) 40 mg tablet Take 1 Tab by mouth nightly. Indications: mixed hyperlipidemia    DOCOSAHEXANOIC ACID/EPA (FISH OIL PO) Take  by mouth daily.  multivitamin (ONE A DAY) tablet Take 1 Tab by mouth daily. No current facility-administered medications for this visit.         Dawson Guzman MD  [facility] [address] [place] [address] [address], [address] [place] [address],[address]  [facility], [facility]  [phone]

## 2018-05-31 ENCOUNTER — HOSPITAL ENCOUNTER (OUTPATIENT)
Dept: CARDIAC REHAB | Age: 73
Discharge: HOME | End: 2018-05-31
Payer: MEDICARE

## 2018-05-31 VITALS — WEIGHT: 159 LBS | BODY MASS INDEX: 28.17 KG/M2

## 2018-05-31 PROCEDURE — 93797 PHYS/QHP OP CAR RHAB WO ECG: CPT

## 2018-05-31 PROCEDURE — 93798 PHYS/QHP OP CAR RHAB W/ECG: CPT

## 2018-06-05 ENCOUNTER — HOSPITAL ENCOUNTER (OUTPATIENT)
Dept: CARDIAC REHAB | Age: 73
Discharge: HOME | End: 2018-06-05
Payer: MEDICARE

## 2018-06-05 VITALS — BODY MASS INDEX: 28.34 KG/M2 | WEIGHT: 160 LBS

## 2018-06-05 PROCEDURE — 93798 PHYS/QHP OP CAR RHAB W/ECG: CPT

## 2018-06-07 ENCOUNTER — APPOINTMENT (OUTPATIENT)
Dept: CARDIAC REHAB | Age: 73
End: 2018-06-07
Payer: MEDICARE

## 2018-06-08 ENCOUNTER — HOSPITAL ENCOUNTER (OUTPATIENT)
Dept: CARDIAC REHAB | Age: 73
Discharge: HOME | End: 2018-06-08
Payer: MEDICARE

## 2018-06-08 VITALS — BODY MASS INDEX: 28.17 KG/M2 | WEIGHT: 159 LBS

## 2018-06-08 PROCEDURE — 93798 PHYS/QHP OP CAR RHAB W/ECG: CPT

## 2018-06-12 ENCOUNTER — HOSPITAL ENCOUNTER (OUTPATIENT)
Dept: CARDIAC REHAB | Age: 73
Discharge: HOME | End: 2018-06-12
Payer: MEDICARE

## 2018-06-12 VITALS — BODY MASS INDEX: 28.7 KG/M2 | WEIGHT: 162 LBS

## 2018-06-12 PROCEDURE — 93798 PHYS/QHP OP CAR RHAB W/ECG: CPT

## 2018-06-13 ENCOUNTER — APPOINTMENT (OUTPATIENT)
Dept: CARDIAC REHAB | Age: 73
End: 2018-06-13
Payer: MEDICARE

## 2018-06-14 ENCOUNTER — APPOINTMENT (OUTPATIENT)
Dept: CARDIAC REHAB | Age: 73
End: 2018-06-14
Payer: MEDICARE

## 2018-06-15 ENCOUNTER — HOSPITAL ENCOUNTER (OUTPATIENT)
Dept: CARDIAC REHAB | Age: 73
Discharge: HOME | End: 2018-06-15
Payer: MEDICARE

## 2018-06-15 VITALS — WEIGHT: 160 LBS | BODY MASS INDEX: 28.34 KG/M2

## 2018-06-15 PROCEDURE — 93798 PHYS/QHP OP CAR RHAB W/ECG: CPT

## 2018-06-19 ENCOUNTER — HOSPITAL ENCOUNTER (OUTPATIENT)
Dept: CARDIAC REHAB | Age: 73
Discharge: HOME | End: 2018-06-19
Payer: MEDICARE

## 2018-06-19 VITALS — BODY MASS INDEX: 27.99 KG/M2 | WEIGHT: 158 LBS

## 2018-06-19 PROCEDURE — 93798 PHYS/QHP OP CAR RHAB W/ECG: CPT

## 2018-06-21 ENCOUNTER — APPOINTMENT (OUTPATIENT)
Dept: CARDIAC REHAB | Age: 73
End: 2018-06-21
Payer: MEDICARE

## 2018-06-22 ENCOUNTER — HOSPITAL ENCOUNTER (OUTPATIENT)
Dept: CARDIAC REHAB | Age: 73
Discharge: HOME | End: 2018-06-22
Payer: MEDICARE

## 2018-06-22 VITALS — BODY MASS INDEX: 27.81 KG/M2 | WEIGHT: 157 LBS

## 2018-06-22 PROCEDURE — 93798 PHYS/QHP OP CAR RHAB W/ECG: CPT

## 2018-06-26 ENCOUNTER — HOSPITAL ENCOUNTER (OUTPATIENT)
Dept: CARDIAC REHAB | Age: 73
Discharge: HOME | End: 2018-06-26
Payer: MEDICARE

## 2018-06-26 VITALS — BODY MASS INDEX: 27.63 KG/M2 | WEIGHT: 156 LBS

## 2018-06-26 PROCEDURE — 93798 PHYS/QHP OP CAR RHAB W/ECG: CPT

## 2018-06-28 ENCOUNTER — APPOINTMENT (OUTPATIENT)
Dept: CARDIAC REHAB | Age: 73
End: 2018-06-28
Payer: MEDICARE

## 2018-06-29 ENCOUNTER — HOSPITAL ENCOUNTER (OUTPATIENT)
Dept: CARDIAC REHAB | Age: 73
Discharge: HOME | End: 2018-06-29
Payer: MEDICARE

## 2018-06-29 VITALS — WEIGHT: 156 LBS | BODY MASS INDEX: 27.63 KG/M2

## 2018-06-29 PROCEDURE — 93798 PHYS/QHP OP CAR RHAB W/ECG: CPT

## 2018-07-03 ENCOUNTER — HOSPITAL ENCOUNTER (OUTPATIENT)
Dept: CARDIAC REHAB | Age: 73
Discharge: HOME | End: 2018-07-03
Payer: MEDICARE

## 2018-07-03 VITALS — BODY MASS INDEX: 27.63 KG/M2 | WEIGHT: 156 LBS

## 2018-07-03 PROCEDURE — 93798 PHYS/QHP OP CAR RHAB W/ECG: CPT

## 2018-07-05 ENCOUNTER — APPOINTMENT (OUTPATIENT)
Dept: CARDIAC REHAB | Age: 73
End: 2018-07-05
Payer: MEDICARE

## 2018-07-06 ENCOUNTER — HOSPITAL ENCOUNTER (OUTPATIENT)
Dept: CARDIAC REHAB | Age: 73
Discharge: HOME | End: 2018-07-06
Payer: MEDICARE

## 2018-07-06 VITALS — BODY MASS INDEX: 27.63 KG/M2 | WEIGHT: 156 LBS

## 2018-07-06 PROCEDURE — 93798 PHYS/QHP OP CAR RHAB W/ECG: CPT

## 2018-07-10 ENCOUNTER — HOSPITAL ENCOUNTER (OUTPATIENT)
Dept: CARDIAC REHAB | Age: 73
Discharge: HOME | End: 2018-07-10
Payer: MEDICARE

## 2018-07-10 VITALS — BODY MASS INDEX: 27.63 KG/M2 | WEIGHT: 156 LBS

## 2018-07-10 PROCEDURE — 93798 PHYS/QHP OP CAR RHAB W/ECG: CPT

## 2018-07-12 ENCOUNTER — APPOINTMENT (OUTPATIENT)
Dept: CARDIAC REHAB | Age: 73
End: 2018-07-12
Payer: MEDICARE

## 2018-07-13 ENCOUNTER — HOSPITAL ENCOUNTER (OUTPATIENT)
Dept: CARDIAC REHAB | Age: 73
Discharge: HOME | End: 2018-07-13
Payer: MEDICARE

## 2018-07-13 VITALS — BODY MASS INDEX: 27.46 KG/M2 | WEIGHT: 155 LBS

## 2018-07-13 PROCEDURE — 93798 PHYS/QHP OP CAR RHAB W/ECG: CPT

## 2018-07-17 ENCOUNTER — HOSPITAL ENCOUNTER (OUTPATIENT)
Dept: CARDIAC REHAB | Age: 73
Discharge: HOME | End: 2018-07-17
Payer: MEDICARE

## 2018-07-17 VITALS — BODY MASS INDEX: 27.46 KG/M2 | WEIGHT: 155 LBS

## 2018-07-17 PROCEDURE — 93798 PHYS/QHP OP CAR RHAB W/ECG: CPT

## 2018-07-19 ENCOUNTER — APPOINTMENT (OUTPATIENT)
Dept: CARDIAC REHAB | Age: 73
End: 2018-07-19
Payer: MEDICARE

## 2018-07-20 ENCOUNTER — APPOINTMENT (OUTPATIENT)
Dept: CARDIAC REHAB | Age: 73
End: 2018-07-20
Payer: MEDICARE

## 2018-07-20 ENCOUNTER — HOSPITAL ENCOUNTER (OUTPATIENT)
Dept: CARDIAC REHAB | Age: 73
Discharge: HOME | End: 2018-07-20
Payer: MEDICARE

## 2018-07-20 VITALS — BODY MASS INDEX: 27.46 KG/M2 | WEIGHT: 155 LBS

## 2018-07-20 PROCEDURE — 93798 PHYS/QHP OP CAR RHAB W/ECG: CPT

## 2018-07-26 ENCOUNTER — APPOINTMENT (OUTPATIENT)
Dept: CARDIAC REHAB | Age: 73
End: 2018-07-26
Payer: MEDICARE

## 2018-08-30 NOTE — CARDIO/PULMONARY
Lo Calderon  Completed phase II cardiac rehab and attended 22 monitored sessions from 04/26/18 - 07/20/18. Lo Calderon is interested in maintaining optimal health and will work with Dr. Stephani Pride. Lo Calderon has improved her endurance and stamina through regular exercise, lost 12 lbs and 2 inches from her waist. Blood pressure is 130/78 and is WNL. He has also improved her nutrition, [employer] and depression scores and these were reviewed with patient. Lo Calderon plans to continue exercising at home and has set revised goals that includes walking or using TM 3 to 5 times a week for 40 to 60 minutes.   Libby Henry RN  8/30/2018

## 2018-09-11 ENCOUNTER — OFFICE VISIT (OUTPATIENT)
Dept: CARDIOLOGY CLINIC | Age: 73
End: 2018-09-11

## 2018-09-11 VITALS
RESPIRATION RATE: 16 BRPM | SYSTOLIC BLOOD PRESSURE: 140 MMHG | DIASTOLIC BLOOD PRESSURE: 80 MMHG | HEIGHT: 63 IN | WEIGHT: 149.4 LBS | HEART RATE: 64 BPM | BODY MASS INDEX: 26.47 KG/M2

## 2018-09-11 DIAGNOSIS — I21.4 NSTEMI (NON-ST ELEVATED MYOCARDIAL INFARCTION) (HCC): ICD-10-CM

## 2018-09-11 DIAGNOSIS — E78.00 HYPERCHOLESTEREMIA: ICD-10-CM

## 2018-09-11 DIAGNOSIS — I44.7 LBBB (LEFT BUNDLE BRANCH BLOCK): ICD-10-CM

## 2018-09-11 DIAGNOSIS — E11.21 TYPE 2 DIABETES WITH NEPHROPATHY (HCC): ICD-10-CM

## 2018-09-11 DIAGNOSIS — I50.21 CHF NYHA CLASS II, ACUTE, SYSTOLIC (HCC): Primary | ICD-10-CM

## 2018-09-11 DIAGNOSIS — E78.5 DYSLIPIDEMIA, GOAL LDL BELOW 70: ICD-10-CM

## 2018-09-11 DIAGNOSIS — I10 HYPERTENSION, UNSPECIFIED TYPE: ICD-10-CM

## 2018-09-11 DIAGNOSIS — I25.10 CORONARY ARTERY DISEASE INVOLVING NATIVE CORONARY ARTERY OF NATIVE HEART WITHOUT ANGINA PECTORIS: ICD-10-CM

## 2018-09-11 DIAGNOSIS — E11.59 TYPE 2 DIABETES MELLITUS WITH OTHER CIRCULATORY COMPLICATION, WITHOUT LONG-TERM CURRENT USE OF INSULIN (HCC): ICD-10-CM

## 2018-09-11 NOTE — MR AVS SNAPSHOT
[address] [facility] [address] [place] 
[phone] Patient: Primo Jones MRN: [id number] CYL:[birth year] Visit Information Date & Time Provider Department Dept. Phone Encounter #  
 9/11/2018  8:20 AM Yoni Singh MD [facility] Radha Rosenthal [phone] [id number] Follow-up Instructions Return in about 3 months (around 12/11/2018). Follow-up and Disposition History Your Appointments 1/15/2019 11:00 AM  
ECHO CARDIOGRAMS 2D with ECHO, [facility] ([facility] SCHEDULING) Appt Note: echo for HF 11:00 Dr. Haseeb Rojas 11:40 [address] [facility] [address]  
[address] [facility]  
  
    
 1/15/2019 11:40 AM  
ESTABLISHED PATIENT with Yoni Singh MD  
[facility] ([address] [facility]) Appt Note: echo for HF 11:00 Dr. Haseeb Rojas 11:40 [address] [place]  
[address] [address] Upcoming Health Maintenance Date Due Hepatitis C Screening 1945 FOOT EXAM Q1 8/16/1956 MICROALBUMIN Q1 8/16/1956 EYE EXAM RETINAL OR DILATED Q1 8/16/1956 DTaP/Tdap/Td series (1 - Tdap) 8/16/1967 BREAST CANCER SCRN MAMMOGRAM 8/16/1996 FOBT Q 1 YEAR AGE 50-75 8/16/1996 GLAUCOMA SCREENING Q2Y 8/16/2011 Bone Densitometry (Dexa) Screening 8/16/2011 Pneumococcal 65+ Low/Medium Risk (1 of 2 - PCV13) 8/16/2011 MEDICARE YEARLY EXAM 3/14/2018 Influenza Age 5 to Adult 8/1/2018 HEMOGLOBIN A1C Q6M 8/26/2018 LIPID PANEL Q1 2/26/2019 Allergies as of 9/11/2018  Review Complete On: 9/11/2018 By: Sofia Montgomery Severity Noted Reaction Type Reactions Adhesive Tape-silicones  95/13/8649    Rash Current Immunizations  Reviewed on 4/26/2018 Name Date Influenza Vaccine 10/19/2017 Varicella Virus Vaccine 8/14/2012 Not reviewed this visit You Were Diagnosed With   
  
 Codes Comments CHF NYHA class II, acute, systolic (HCC)    -  Primary ICD-10-CM: I50.21 ICD-9-CM: 428.21, 428.0 Coronary artery disease involving native coronary artery of native heart without angina pectoris     ICD-10-CM: I25.10 ICD-9-CM: 414.01 Dyslipidemia, goal LDL below 70     ICD-10-CM: E78.5 ICD-9-CM: 272.4 LBBB (left bundle branch block)     ICD-10-CM: I44.7 ICD-9-CM: 426.3 Hypertension, unspecified type     ICD-10-CM: I10 
ICD-9-CM: 401.9 Hypercholesteremia     ICD-10-CM: E78.00 ICD-9-CM: 272.0   
 NSTEMI (non-ST elevated myocardial infarction) ([facility]     ICD-10-CM: I21.4 ICD-9-CM: 410.70 Type 2 diabetes with nephropathy (HCC)     ICD-10-CM: E11.21 
ICD-9-CM: 250.40, 583.81 Type 2 diabetes mellitus with other circulatory complication, without long-term current use of insulin (HCC)     ICD-10-CM: E11.59 
ICD-9-CM: 250.70 Vitals BP Pulse Resp Height(growth percentile) Weight(growth percentile) BMI  
 140/80 (BP 1 Location: Left arm, BP Patient Position: Sitting) 64 16 5' 3\" (1.6 m) 149 lb 6.4 oz (67.8 kg) 26.47 kg/m2 Smoking Status Never Smoker Vitals History BMI and BSA Data Body Mass Index Body Surface Area  
 26.47 kg/m 2 1.74 m 2 Preferred Pharmacy Pharmacy Name Phone [id number] [phone] Your Updated Medication List  
  
   
This list is accurate as of 9/11/18  9:01 AM.  Always use your most recent med list.  
  
  
  
  
 aspirin delayed-release 81 mg tablet Take 1 Tab by mouth daily. FISH OIL PO Take  by mouth daily. furosemide 40 mg tablet Commonly known as:  LASIX Take 1 Tab by mouth daily. losartan 25 mg tablet Commonly known as:  COZAAR  
 Take 1 Tab by mouth daily. metoprolol succinate 25 mg XL tablet Commonly known as:  TOPROL-XL Take 0.5 Tabs by mouth daily. multivitamin tablet Commonly known as:  ONE A DAY Take 1 Tab by mouth daily. potassium chloride SR 20 mEq tablet Commonly known as:  K-TAB Take 1 Tab by mouth daily. Indications: hypokalemia prevention  
  
 pravastatin 40 mg tablet Commonly known as:  PRAVACHOL Take 1 Tab by mouth nightly. Indications: mixed hyperlipidemia We Performed the Following 2D ECHO COMPLETE ADULT (TTE) W OR WO CONTR [32322 CPT(R)] Follow-up Instructions Return in about 3 months (around 12/11/2018). Introducing [facility]! [facility] introduces nvite patient portal. Now you can access parts of your medical record, email your doctor's office, and request medication refills online. 1. In your internet browser, go to [email or web address] 2. Click on the First Time User? Click Here link in the Sign In box. You will see the New Member Sign Up page. 3. Enter your nvite Access Code exactly as it appears below. You will not need to use this code after youve completed the sign-up process. If you do not sign up before the expiration date, you must request a new code. · nvite Access Code: [email or web address] Expires: 12/10/2018  8:59 AM 
 
4. Enter the last four digits of your Social Security Number (xxxx) and Date of Birth (mm/dd/yyyy) as indicated and click Submit. You will be taken to the next sign-up page. 5. Create a Purchextt ID. This will be your nvite login ID and cannot be changed, so think of one that is secure and easy to remember. 6. Create a nvite password. You can change your password at any time. 7. Enter your Password Reset Question and Answer. This can be used at a later time if you forget your password. 8. Enter your e-mail address.  You will receive e-mail notification when new information is available in [employer]. 9. Click Sign Up. You can now view and download portions of your medical record. 10. Click the Download Summary menu link to download a portable copy of your medical information. If you have questions, please visit the Frequently Asked Questions section of the [employer] website. Remember, [employer] is NOT to be used for urgent needs. For medical emergencies, dial 911. Now available from your iPhone and Android! Please provide this summary of care documentation to your next provider. Your primary care clinician is listed as Pravin Vega. If you have any questions after today's visit, please call [phone].

## 2018-09-11 NOTE — PROGRESS NOTES
[facility]  [phone]    HPI: Heidi Beal, a 68y.o. year-old who presents for follow up regarding her CHF. She is feeling well today, NYHA Class I-II. No swelling. NO palpitations. Discussion of defibrillator. She declines to have it done at this time. And she says that if her heart stops she would not want CPR or resuscitation. She just got back from vacation and is feeling good, did not gain any weight while gone, [employer]- [place] side. BP doing well at home. She is doing a lot of walking and feeling well. She stopped cardiac rehab, finished her program and lost 2in off her waist.   She will continue 1/2 tab of toprol Xl at night, [id number] daily  She denies any dizziness or lightheadedness, syncope  Her LE edema has been minimal at home  She denies any chest pain or palpitations  No dyspnea with exertion or orthopnea, really minimal symptoms    Asked her to ask Dr. Adilson Shirley office to send a copy of her lab results to us for review, also of her testing ? PFT? Also had NICOLE  Had TTE last - LVEF 25--30%, discussed findings with her and her sister who is taking notes today. Declines ICD. Prefers DNR. Assessment/Plan:  1.  Acute respiratory failure - secondary to pulmonary edema, CHF, NSTEMI, improved with diuresis   2.  NSTEMI - troponin peaked at 2.4 and trended down, 12 lead ECG with NSR with LBBB, cardiac cath revealed significant RCA disease with collaterals and other non-obstructive CAD, no PCI needed, managing CAD medically, starting cardiac rehab 4/26/18  -continue ASA 81mg daily, Toprol XL, pravastatin 40mg at bedtime  3.  Dyslipidemia - , , , HDL 76, cont pravastatin 40mg daily  4.  HTN - as above, stable now  5.  DM Type 2 - Hgb A1C 6.4%, management per PCP  6.  Elevated TSH 5.3 - but T4 ok at 5.8, having TFTs rechecked, due  7.  LBBB - since 2/18   8.  Hypokalemia - on KCL 20meq PO daily   9.  GARCIA - creatinine 1.4 baseline  10.   Acute systolic heart failure - LVEF 15-20% by initial TTE 2/18, TTE today showed LVEF 25-30%, NYHA Class I-II   -continue Toprol XL 12.5mg daily, losartan 25mg dialy and Lasix 40mg daily  -NO ICD at pt pref, finds the idea extremely stressful and causes tears  -plan to eval EF with echo at next visit in 3 months. Fam Hx: + early CAD, father passed from MI at age 62, mother passed at age 80 after MI with CHF, two brothers with CABG (age unknown)  Soc Hx: no tobacco use, no etoh use     Cardiac Cath 2/18 - Normal LM and LAD, 30% proximal LCX, RCA proximal 80% then mid 100%, fills well distally via left to right collaterals. LVEF 20%. Echo 2/18 - LV mildly dilated. LVEF 15-20%, severe diffuse hypokinesis, wall thickness was mildly increased, mild to mod MR, mild TR    She  has a past medical history of CAD (coronary artery disease) (02/26/2018); [facility] (02/26/2018); Diabetes ([facility] (02/26/2018); and Hypertension. Cardiovascular ROS: no chest pain or dyspnea on exertion  Respiratory ROS: no cough, shortness of breath, or wheezing  Neurological ROS: no TIA or stroke symptoms  All other systems negative except as above. PE  Vitals:    09/11/18 0833   BP: 140/80   Pulse: 64   Resp: 16   Weight: 149 lb 6.4 oz (67.8 kg)   Height: 5' 3\" (1.6 m)    Body mass index is 26.47 kg/(m^2).    General appearance - alert, well appearing, and in no distress  Mental status - affect appropriate to mood  Eyes - sclera anicteric, moist mucous membranes  Neck - supple  Lymphatics - not assessed  Chest - clear to auscultation, no wheezes, rales or rhonchi  Heart - normal rate, regular rhythm, normal S1, S2, no murmurs, rubs, clicks or gallops  Abdomen - soft, nontender, nondistended, no masses or organomegaly  Back exam - full range of motion, no tenderness  Neurological - cranial nerves II through XII grossly intact, no focal deficit  Musculoskeletal - no muscular tenderness noted, normal strength  Extremities - peripheral pulses normal, trace LE edema    Skin - normal coloration  no rashes    Recent Labs:  Lab Results   Component Value Date/Time    Cholesterol, total 286 (H) 02/26/2018 01:27 AM    HDL Cholesterol 76 02/26/2018 01:27 AM    LDL, calculated 185.4 (H) 02/26/2018 01:27 AM    Triglyceride 123 02/26/2018 01:27 AM    CHOL/HDL Ratio 3.8 02/26/2018 01:27 AM     Lab Results   Component Value Date/Time    Creatinine 1.29 (H) 03/21/2018 11:01 AM     Lab Results   Component Value Date/Time    BUN 27 03/21/2018 11:01 AM    BUN (POC) 21 (H) 02/26/2018 01:50 AM     Lab Results   Component Value Date/Time    Potassium 3.7 03/21/2018 11:01 AM     Lab Results   Component Value Date/Time    Hemoglobin A1c 6.4 (H) 02/26/2018 01:27 AM     Lab Results   Component Value Date/Time    HGB 13.0 02/27/2018 06:57 AM     Lab Results   Component Value Date/Time    PLATELET 686 85/45/2835 06:57 AM       Reviewed:  Past Medical History:   Diagnosis Date    CAD (coronary artery disease) 02/26/2018    NSTEMI    Congest[facility] ([facility] 02/26/2018    Diabetes ([facility] 02/26/2018    pre-DM    Hypertension      History   Smoking Status    Never Smoker   Smokeless Tobacco    Never Used     History   Alcohol Use No     Allergies   Allergen Reactions    Adhesive Tape-Silicones Rash       Current Outpatient Prescriptions   Medication Sig    losartan (COZAAR) 25 mg tablet Take 1 Tab by mouth daily.  metoprolol succinate (TOPROL-XL) 25 mg XL tablet Take 0.5 Tabs by mouth daily.  pravastatin (PRAVACHOL) 40 mg tablet Take 1 Tab by mouth nightly. Indications: mixed hyperlipidemia    furosemide (LASIX) 40 mg tablet Take 1 Tab by mouth daily.  aspirin delayed-release 81 mg tablet Take 1 Tab by mouth daily.  potassium chloride SR (K-TAB) 20 mEq tablet Take 1 Tab by mouth daily. Indications: hypokalemia prevention    DOCOSAHEXANOIC ACID/EPA (FISH OIL PO) Take  by mouth daily.     multivitamin (ONE A DAY) tablet Take 1 Tab by mouth daily. No current facility-administered medications for this visit.         Edwin Clement MD  [facility] [address] [place] [address] [address], [address] [place] [address]  Luciano Duncan  [phone]

## 2019-01-15 ENCOUNTER — CLINICAL SUPPORT (OUTPATIENT)
Dept: CARDIOLOGY CLINIC | Age: 74
End: 2019-01-15

## 2019-01-15 ENCOUNTER — OFFICE VISIT (OUTPATIENT)
Dept: CARDIOLOGY CLINIC | Age: 74
End: 2019-01-15

## 2019-01-15 ENCOUNTER — TELEPHONE (OUTPATIENT)
Dept: CARDIOLOGY CLINIC | Age: 74
End: 2019-01-15

## 2019-01-15 VITALS
BODY MASS INDEX: 26.4 KG/M2 | SYSTOLIC BLOOD PRESSURE: 130 MMHG | RESPIRATION RATE: 16 BRPM | DIASTOLIC BLOOD PRESSURE: 80 MMHG | HEART RATE: 74 BPM | WEIGHT: 149 LBS | HEIGHT: 63 IN

## 2019-01-15 DIAGNOSIS — I50.9 CONGESTIVE HEART FAILURE, UNSPECIFIED HF CHRONICITY, UNSPECIFIED HEART FAILURE TYPE (HCC): Primary | ICD-10-CM

## 2019-01-15 DIAGNOSIS — E78.5 DYSLIPIDEMIA, GOAL LDL BELOW 70: ICD-10-CM

## 2019-01-15 DIAGNOSIS — I50.21 CHF NYHA CLASS II, ACUTE, SYSTOLIC (HCC): ICD-10-CM

## 2019-01-15 DIAGNOSIS — R09.89 BRUIT OF LEFT CAROTID ARTERY: ICD-10-CM

## 2019-01-15 DIAGNOSIS — I25.10 CORONARY ARTERY DISEASE INVOLVING NATIVE CORONARY ARTERY OF NATIVE HEART WITHOUT ANGINA PECTORIS: ICD-10-CM

## 2019-01-15 DIAGNOSIS — I10 ESSENTIAL HYPERTENSION: ICD-10-CM

## 2019-01-15 DIAGNOSIS — I50.22 CHRONIC SYSTOLIC CONGESTIVE HEART FAILURE (HCC): Primary | ICD-10-CM

## 2019-01-15 RX ORDER — LOSARTAN POTASSIUM 50 MG/1
TABLET ORAL DAILY
COMMUNITY
End: 2019-01-28

## 2019-01-15 RX ORDER — ROSUVASTATIN CALCIUM 20 MG/1
20 TABLET, COATED ORAL
Qty: 90 TAB | Refills: 1 | Status: SHIPPED | OUTPATIENT
Start: 2019-01-15 | End: 2019-01-16 | Stop reason: ALTCHOICE

## 2019-01-15 NOTE — PROGRESS NOTES
[facility]  [phone]    HPI: Red Benjamin, a 68y.o. year-old who presents for follow up regarding her CHF. Echo today shows LVEF up to 35-40%  No chest pain or dyspnea  No palptiations  No dizziness or syncope   LE edema not bothering her  BP well controlled at home  Walks treadmill x 15 minutes/day  Reviewed code status, she wants to be a DNR, she expressed her desires for this at her last office visit too  Does not want CPR, defibrillation, intubation  She is going to [place] and will ride a [employer] and an ATV    Assessment/Plan:  1.  NSTEMI/CAD - troponin peaked at 2.4, cardiac cath revealed significant RCA disease with collaterals and other non-obstructive CAD, no PCI needed, managing CAD medically  -continue ASA 81mg daily, Toprol XL, statin   3.  Dyslipidemia - , will stop pravastatin and begin rosuvastatin 20mg daily, advised her to have fasting lipids checked in 3 months  4.  HTN - well controlled   5.  DM Type 2 - Hgb A1C 6.0%, management per PCP  6.  Vitamin D deficiency - level 43  7.  LBBB - since 2/18   8.  Hypokalemia - well controlled on KCL 20meq PO daily   9.  Left carotid bruit - will order carotid duplex to assess for stenosis  10.   Chronic systolic heart failure - LVEF 15-20% by initial TTE 2/18, TTE today showed LVEF 35-40%, will repeat TTE prior to next visit, NYHA Class I-II   -continue Toprol XL 12.5mg daily, losartan 25mg dialy and Lasix 40mg daily  -declines AICD, finds the idea extremely stressful and caused tears to discuss it, wants to be a DNR - filled out DNR form today and gave her the original    -she will follow up in the office in 3 months     Fam Hx: + early CAD, father passed from MI at age 62, mother passed at age 80 after MI with CHF, two brothers with CABG (age unknown)  Soc Hx: no tobacco use, no etoh use     Echo 1/19 - (prelim) LVEF 35-40%  Cardiac Cath 2/18 - Normal LM and LAD, 30% proximal LCX, RCA proximal 80% then mid 100%, fills well distally via left to right collaterals. LVEF 20%. Echo 2/18 - LV mildly dilated. LVEF 15-20%, severe diffuse hypokinesis, wall thickness was mildly increased, mild to mod MR, mild TR    She  has a past medical history of CAD (coronary artery disease), Congestive heart failure ([facility] Diabetes ([facility] and Hypertension. Cardiovascular ROS: no chest pain or dyspnea on exertion  Respiratory ROS: no cough, shortness of breath, or wheezing  Neurological ROS: no TIA or stroke symptoms  All other systems negative except as above. PE  Vitals:    01/15/19 1133   BP: 130/80   Pulse: 74   Resp: 16   Weight: 149 lb (67.6 kg)   Height: 5' 3\" (1.6 m)    Body mass index is 26.39 kg/m².    General appearance - alert, well appearing, and in no distress  Mental status - affect appropriate to mood  Eyes - sclera anicteric, moist mucous membranes  Neck - supple  Lymphatics - not assessed  Chest - clear to auscultation, no wheezes, rales or rhonchi  Heart - normal rate, regular rhythm, normal S1, S2, no murmurs, rubs, clicks or gallops  Abdomen - soft, nontender, nondistended, no masses or organomegaly  Back exam - full range of motion, no tenderness  Neurological - cranial nerves II through XII grossly intact, no focal deficit  Musculoskeletal - no muscular tenderness noted, normal strength  Extremities - peripheral pulses normal, trace LE edema    Skin - normal coloration  no rashes    Recent Labs:  Lab Results   Component Value Date/Time    Cholesterol, total 286 (H) 02/26/2018 01:27 AM    HDL Cholesterol 76 02/26/2018 01:27 AM    LDL, calculated 185.4 (H) 02/26/2018 01:27 AM    Triglyceride 123 02/26/2018 01:27 AM    CHOL/HDL Ratio 3.8 02/26/2018 01:27 AM     Lab Results   Component Value Date/Time    Creatinine 1.29 (H) 03/21/2018 11:01 AM     Lab Results   Component Value Date/Time    BUN 27 03/21/2018 11:01 AM    BUN (POC) 21 (H) 02/26/2018 01:50 AM     Lab Results   Component Value Date/Time    Potassium 3.7 03/21/2018 11:01 AM     Lab Results   Component Value Date/Time    Hemoglobin A1c 6.4 (H) 02/26/2018 01:27 AM     Lab Results   Component Value Date/Time    HGB 13.0 02/27/2018 06:57 AM     Lab Results   Component Value Date/Time    PLATELET 231 42/11/2023 06:57 AM       Reviewed:  Past Medical History:   Diagnosis Date    CAD (coronary artery disease) 02/26/2018    NSTEMI    Congest[facility] failure ([facility] 02/26/2018    Diabetes ([facility] 02/26/2018    pre-DM    Hypertension      Social History     Tobacco Use   Smoking Status Never Smoker   Smokeless Tobacco Never Used     Social History     Substance and Sexual Activity   Alcohol Use No     Allergies   Allergen Reactions    Adhesive Tape-Silicones Rash       Current Outpatient Medications   Medication Sig    losartan (COZAAR) 50 mg tablet Take  by mouth daily.  rosuvastatin (CRESTOR) 20 mg tablet Take 1 Tab by mouth nightly.  metoprolol succinate (TOPROL-XL) 25 mg XL tablet Take 0.5 Tabs by mouth daily.  furosemide (LASIX) 40 mg tablet Take 1 Tab by mouth daily.  aspirin delayed-release 81 mg tablet Take 1 Tab by mouth daily.  potassium chloride SR (K-TAB) 20 mEq tablet Take 1 Tab by mouth daily. Indications: hypokalemia prevention    DOCOSAHEXANOIC ACID/EPA (FISH OIL PO) Take  by mouth daily.  multivitamin (ONE A DAY) tablet Take 1 Tab by mouth daily. No current facility-administered medications for this visit.         Foster Drake MD  [facility] [address] [facility] [address], [address] [facility]  [facility]  [phone]

## 2019-01-15 NOTE — TELEPHONE ENCOUNTER
Pt called to discuss a cheaper prescription for Rosuvastatin due to high cost.  Phone # [phone]  Thanks

## 2019-01-15 NOTE — PATIENT INSTRUCTIONS
Please increase your exercise to 30 minutes/day     Please stop your pravastatin and begin rosuvastatin 20mg daily  Please have fasting labs drawn in 3 months to recheck your cholesterol prior to your return visit

## 2019-01-16 RX ORDER — ATORVASTATIN CALCIUM 40 MG/1
40 TABLET, FILM COATED ORAL DAILY
Qty: 90 TAB | Refills: 3 | Status: SHIPPED | OUTPATIENT
Start: 2019-01-16 | End: 2019-12-19 | Stop reason: ALTCHOICE

## 2019-01-16 RX ORDER — ATORVASTATIN CALCIUM 40 MG/1
TABLET, FILM COATED ORAL DAILY
COMMUNITY
End: 2019-01-16 | Stop reason: SDUPTHER

## 2019-01-16 NOTE — TELEPHONE ENCOUNTER
Returned patient's call, 2 pt identifiers used  Patient states Crestor will cost her $190/month and she is requesting a cheaper alternative. Per Dr. Shaaron Schaumann patient may take Atorvastatin 40 mg 1 tab nightly. Requested Prescriptions     Signed Prescriptions Disp Refills    atorvastatin (LIPITOR) 40 mg tablet 90 Tab 3     Sig: Take 1 Tab by mouth daily.      Authorizing Provider: Lefty Mejia     Ordering User: Kirsten Ramos

## 2019-01-26 ENCOUNTER — HOSPITAL ENCOUNTER (INPATIENT)
Age: 74
LOS: 1 days | Discharge: HOME | DRG: 312 | End: 2019-01-28
Attending: EMERGENCY MEDICINE | Admitting: INTERNAL MEDICINE
Payer: MEDICARE

## 2019-01-26 ENCOUNTER — APPOINTMENT (OUTPATIENT)
Dept: GENERAL RADIOLOGY | Age: 74
DRG: 312 | End: 2019-01-26
Attending: EMERGENCY MEDICINE
Payer: MEDICARE

## 2019-01-26 ENCOUNTER — APPOINTMENT (OUTPATIENT)
Dept: CT IMAGING | Age: 74
DRG: 312 | End: 2019-01-26
Attending: INTERNAL MEDICINE
Payer: MEDICARE

## 2019-01-26 DIAGNOSIS — I49.8 BIGEMINY: ICD-10-CM

## 2019-01-26 DIAGNOSIS — E87.6 HYPOKALEMIA: ICD-10-CM

## 2019-01-26 DIAGNOSIS — N18.9 CHRONIC KIDNEY DISEASE, UNSPECIFIED CKD STAGE: ICD-10-CM

## 2019-01-26 DIAGNOSIS — R55 SYNCOPE AND COLLAPSE: Primary | ICD-10-CM

## 2019-01-26 DIAGNOSIS — R55 SYNCOPE: ICD-10-CM

## 2019-01-26 LAB
ALBUMIN SERPL-MCNC: 4 G/DL (ref 3.5–5)
ALBUMIN/GLOB SERPL: 0.9 {RATIO} (ref 1.1–2.2)
ALP SERPL-CCNC: 86 U/L (ref 45–117)
ALT SERPL-CCNC: 20 U/L (ref 12–78)
ANION GAP SERPL CALC-SCNC: 9 MMOL/L (ref 5–15)
AST SERPL-CCNC: 17 U/L (ref 15–37)
BASOPHILS # BLD: 0.1 K/UL (ref 0–0.1)
BASOPHILS NFR BLD: 1 % (ref 0–1)
BILIRUB SERPL-MCNC: 0.3 MG/DL (ref 0.2–1)
BUN SERPL-MCNC: 38 MG/DL (ref 6–20)
BUN/CREAT SERPL: 30 (ref 12–20)
CALCIUM SERPL-MCNC: 8.9 MG/DL (ref 8.5–10.1)
CHLORIDE SERPL-SCNC: 101 MMOL/L (ref 97–108)
CO2 SERPL-SCNC: 28 MMOL/L (ref 21–32)
COMMENT, HOLDF: NORMAL
CREAT SERPL-MCNC: 1.26 MG/DL (ref 0.55–1.02)
DIFFERENTIAL METHOD BLD: ABNORMAL
EOSINOPHIL # BLD: 0.2 K/UL (ref 0–0.4)
EOSINOPHIL NFR BLD: 2 % (ref 0–7)
ERYTHROCYTE [DISTWIDTH] IN BLOOD BY AUTOMATED COUNT: 13.2 % (ref 11.5–14.5)
GLOBULIN SER CALC-MCNC: 4.5 G/DL (ref 2–4)
GLUCOSE BLD STRIP.AUTO-MCNC: 106 MG/DL (ref 65–100)
GLUCOSE SERPL-MCNC: 100 MG/DL (ref 65–100)
HCT VFR BLD AUTO: 38.2 % (ref 35–47)
HGB BLD-MCNC: 12.3 G/DL (ref 11.5–16)
IMM GRANULOCYTES # BLD AUTO: 0 K/UL (ref 0–0.04)
IMM GRANULOCYTES NFR BLD AUTO: 0 % (ref 0–0.5)
LYMPHOCYTES # BLD: 3.7 K/UL (ref 0.8–3.5)
LYMPHOCYTES NFR BLD: 41 % (ref 12–49)
MAGNESIUM SERPL-MCNC: 2.5 MG/DL (ref 1.6–2.4)
MCH RBC QN AUTO: 31.2 PG (ref 26–34)
MCHC RBC AUTO-ENTMCNC: 32.2 G/DL (ref 30–36.5)
MCV RBC AUTO: 97 FL (ref 80–99)
MONOCYTES # BLD: 0.8 K/UL (ref 0–1)
MONOCYTES NFR BLD: 8 % (ref 5–13)
NEUTS SEG # BLD: 4.4 K/UL (ref 1.8–8)
NEUTS SEG NFR BLD: 48 % (ref 32–75)
NRBC # BLD: 0 K/UL (ref 0–0.01)
NRBC BLD-RTO: 0 PER 100 WBC
PLATELET # BLD AUTO: 362 K/UL (ref 150–400)
PMV BLD AUTO: 10.2 FL (ref 8.9–12.9)
POTASSIUM SERPL-SCNC: 3.1 MMOL/L (ref 3.5–5.1)
PROT SERPL-MCNC: 8.5 G/DL (ref 6.4–8.2)
RBC # BLD AUTO: 3.94 M/UL (ref 3.8–5.2)
SAMPLES BEING HELD,HOLD: NORMAL
SERVICE CMNT-IMP: ABNORMAL
SODIUM SERPL-SCNC: 138 MMOL/L (ref 136–145)
TROPONIN I SERPL-MCNC: <0.05 NG/ML
WBC # BLD AUTO: 9.1 K/UL (ref 3.6–11)

## 2019-01-26 PROCEDURE — 70450 CT HEAD/BRAIN W/O DYE: CPT

## 2019-01-26 PROCEDURE — 93005 ELECTROCARDIOGRAM TRACING: CPT

## 2019-01-26 PROCEDURE — 83735 ASSAY OF MAGNESIUM: CPT

## 2019-01-26 PROCEDURE — 74011250637 HC RX REV CODE- 250/637: Performed by: EMERGENCY MEDICINE

## 2019-01-26 PROCEDURE — 84484 ASSAY OF TROPONIN QUANT: CPT

## 2019-01-26 PROCEDURE — 99218 HC RM OBSERVATION: CPT

## 2019-01-26 PROCEDURE — 99285 EMERGENCY DEPT VISIT HI MDM: CPT

## 2019-01-26 PROCEDURE — 36415 COLL VENOUS BLD VENIPUNCTURE: CPT

## 2019-01-26 PROCEDURE — 71045 X-RAY EXAM CHEST 1 VIEW: CPT

## 2019-01-26 PROCEDURE — 82962 GLUCOSE BLOOD TEST: CPT

## 2019-01-26 PROCEDURE — 85025 COMPLETE CBC W/AUTO DIFF WBC: CPT

## 2019-01-26 PROCEDURE — 80053 COMPREHEN METABOLIC PANEL: CPT

## 2019-01-26 RX ORDER — FAMOTIDINE 10 MG/ML
20 INJECTION INTRAVENOUS
Status: DISCONTINUED | OUTPATIENT
Start: 2019-01-26 | End: 2019-01-26

## 2019-01-26 RX ORDER — POTASSIUM CHLORIDE 750 MG/1
20 TABLET, FILM COATED, EXTENDED RELEASE ORAL
Status: COMPLETED | OUTPATIENT
Start: 2019-01-26 | End: 2019-01-26

## 2019-01-26 RX ORDER — ACETAMINOPHEN 325 MG/1
650 TABLET ORAL
Status: DISCONTINUED | OUTPATIENT
Start: 2019-01-26 | End: 2019-01-28 | Stop reason: HOSPADM

## 2019-01-26 RX ADMIN — POTASSIUM CHLORIDE 20 MEQ: 750 TABLET, EXTENDED RELEASE ORAL at 20:31

## 2019-01-26 NOTE — ED PROVIDER NOTES
68 y.o. female with past medical history significant for CAD, CHF, DM, HTN, who presents from EMS with chief complaint of syncope. Pt has 6:00 PM sudden onset today of a witnessed syncopal episode that occurred w/o warning while she was standing, and lasted for ~30 seconds; no sz-like activity observed. Pt notes being able to ambulate following the incident. Denies light headedness, CP, SOB, n/v, HA, weakness, numbness, diaphoresis, and abd pain. There are no other acute medical concerns at this time. PCP: Kacie Holbrook MD  Cardiologist: Carole Ag. Bernie Gallegos MD    Note written by Silvana Cardona, as dictated by Nolan Cruz MD 6:45 PM      The history is provided by the patient. No  was used. 2/2018 CARDIAC CATH:  Analysis of regional contractile function demonstrated severe global  hypokinesis. --  Global left ventricular function was severely depressed. EF estimated was 20 %. --  CORONARY CIRCULATION:  --  Proximal circumflex: There was a tubular 25 % stenosis. --  Proximal RCA: There was a discrete 80 % stenosis. --  Mid RCA: There was a 100 % stenosis.     Past Medical History:   Diagnosis Date    CAD (coronary artery disease) 02/26/2018    NSTEMI    Congestive heart failure ([facility] 02/26/2018    Diabetes ([facility] 02/26/2018    pre-DM    Hypertension        Past Surgical History:   Procedure Laterality Date    HX HEENT      cataracts         Family History:   Problem Relation Age of Onset    Heart Disease Mother     Heart Disease Father 62        MI    Diabetes Sister     Heart Disease Brother 71        CABG    Cancer Maternal Grandmother     Pulmonary Embolism Paternal Grandmother     Heart Disease Paternal Grandfather     Heart Disease Brother 59        CABG    Cancer Brother        Social History     Socioeconomic History    Marital status: SINGLE     Spouse name: Not on file    Number of children: Not on file    Years of education: Not on file   [facility] Highest education level: Not on file   Social Needs    Financial resource strain: Not on file    Food insecurity - worry: Not on file    Food insecurity - inability: Not on file    Transportation needs - medical: Not on file   [employer] needs - non-medical: Not on file   Occupational History    Not on file   Tobacco Use    Smoking status: Never Smoker    Smokeless tobacco: Never Used   Substance and Sexual Activity    Alcohol use: No    Drug use: No    Sexual activity: Not on file   Other Topics Concern    Not on file   Social History Narrative    Not on file         ALLERGIES: Adhesive tape-silicones    Review of Systems   Constitutional: Negative for diaphoresis. Respiratory: Negative for shortness of breath. Cardiovascular: Negative for chest pain. Gastrointestinal: Negative for abdominal pain, nausea and vomiting. Musculoskeletal: Negative for gait problem. Neurological: Positive for syncope. Negative for seizures, weakness, light-headedness, numbness and headaches. All other systems reviewed and are negative. Vitals:    01/26/19 1845 01/26/19 1850   BP: 180/67 170/62   Pulse: 69 68   Resp: 17    Temp: 97.6 °F (36.4 °C)    SpO2: 99%             Physical Exam     Nursing note and vitals reviewed. Constitutional: appears well-developed and well-nourished. No distress. HENT:   Head: Normocephalic and atraumatic. Sclera anicteric  Nose: No rhinorrhea  Mouth/Throat: Oropharynx is clear and moist. Pharynx normal  Eyes: Conjunctivae are normal. Pupils are equal, round, and reactive to light. Right eye exhibits no discharge. Left eye exhibits no discharge. No scleral icterus. Neck: Painless normal range of motion. Supple  Cardiovascular: Normal rate, regular rhythm, normal heart sounds and intact distal pulses. Exam reveals no gallop and no friction rub. No murmur heard. Pulmonary/Chest: Effort normal and breath sounds normal. No respiratory distress. no wheezes. no rales. Abdominal: Soft. Bowel sounds are normal. Exhibits no distension and no mass. No tenderness. No guarding. Musculoskeletal: Normal range of motion. no tenderness. No edema  Lymphadenopathy:   No cervical adenopathy. Neurological:  Alert and oriented to person, place, and time. Coordination normal. CN 2-12 intact. Moving all extremities. Skin: Skin is warm and dry. No rash noted. No pallor. MDM      SUDDEN SYNCOPE. SIGNIFICANT CARDIAC RISK FACTORS. AT TIMES WITH COUPLETS AND BIGEMENY. NO INJURY. CHECK LABS AND CXR. Procedures  ED EKG interpretation:  Rhythm: normal sinus rhythm; and regular . Rate (approx.): 70; Axis: left axis deviation; no ST/T wave changes; non-intraventricular delay  Note written by Pedro Marino, as dictated by Anderson Patrick MD 6:45 PM    7:58 PM  Patient is being admitted to the hospital.  The results of their tests and reasons for their admission have been discussed with them and/or available family. They convey agreement and understanding for the need to be admitted and for their admission diagnosis. Consultation will be made now with the inpatient physician for hospitalization. Hospitalist Melony for Admission  8:02 PM    ED Room Number: ER18/18  Patient Name and age: Meli Child 68 y.o.  female  Working Diagnosis:   1. Syncope and collapse    2. Bigeminy    3. Chronic kidney disease, unspecified CKD stage    4.  Hypokalemia      Readmission: no  Isolation Requirements:  no  Recommended Level of Care:  telemetry  Code Status:  DNR/DNI        Recent Results (from the past 24 hour(s))   EKG, 12 LEAD, INITIAL    Collection Time: 01/26/19  6:45 PM   Result Value Ref Range    Ventricular Rate 70 BPM    Atrial Rate 70 BPM    P-R Interval 154 ms    QRS Duration 110 ms    Q-T Interval 408 ms    QTC Calculation (Bezet) 440 ms    Calculated P Axis 27 degrees    Calculated R Axis -2 degrees    Calculated T Axis -38 degrees    Diagnosis       Sinus rhythm with premature atrial complexes  Left ventricular hypertrophy with repolarization abnormality  When compared with ECG of 26-FEB-2018 01:27,  premature atrial complexes are now present  Left bundle branch block is no longer present     SAMPLES BEING HELD    Collection Time: 01/26/19  6:57 PM   Result Value Ref Range    SAMPLES BEING HELD 1RED,1BLUE     COMMENT        Add-on orders for these samples will be processed based on acceptable specimen integrity and analyte stability, which may vary by analyte. CBC WITH AUTOMATED DIFF    Collection Time: 01/26/19  6:57 PM   Result Value Ref Range    WBC 9.1 3.6 - 11.0 K/uL    RBC 3.94 3.80 - 5.20 M/uL    HGB 12.3 11.5 - 16.0 g/dL    HCT 38.2 35.0 - 47.0 %    MCV 97.0 80.0 - 99.0 FL    MCH 31.2 26.0 - 34.0 PG    MCHC 32.2 30.0 - 36.5 g/dL    RDW 13.2 11.5 - 14.5 %    PLATELET 776 494 - 028 K/uL    MPV 10.2 8.9 - 12.9 FL    NRBC 0.0 0  WBC    ABSOLUTE NRBC 0.00 0.00 - 0.01 K/uL    NEUTROPHILS 48 32 - 75 %    LYMPHOCYTES 41 12 - 49 %    MONOCYTES 8 5 - 13 %    EOSINOPHILS 2 0 - 7 %    BASOPHILS 1 0 - 1 %    IMMATURE GRANULOCYTES 0 0.0 - 0.5 %    ABS. NEUTROPHILS 4.4 1.8 - 8.0 K/UL    ABS. LYMPHOCYTES 3.7 (H) 0.8 - 3.5 K/UL    ABS. MONOCYTES 0.8 0.0 - 1.0 K/UL    ABS. EOSINOPHILS 0.2 0.0 - 0.4 K/UL    ABS. BASOPHILS 0.1 0.0 - 0.1 K/UL    ABS. IMM.  GRANS. 0.0 0.00 - 0.04 K/UL    DF AUTOMATED     METABOLIC PANEL, COMPREHENSIVE    Collection Time: 01/26/19  6:57 PM   Result Value Ref Range    Sodium 138 136 - 145 mmol/L    Potassium 3.1 (L) 3.5 - 5.1 mmol/L    Chloride 101 97 - 108 mmol/L    CO2 28 21 - 32 mmol/L    Anion gap 9 5 - 15 mmol/L    Glucose 100 65 - 100 mg/dL    BUN 38 (H) 6 - 20 MG/DL    Creatinine 1.26 (H) 0.55 - 1.02 MG/DL    BUN/Creatinine ratio 30 (H) 12 - 20      GFR est AA 51 (L) >60 ml/min/1.73m2    GFR est non-AA 42 (L) >60 ml/min/1.73m2    Calcium 8.9 8.5 - 10.1 MG/DL    Bilirubin, total 0.3 0.2 - 1.0 MG/DL    ALT (SGPT) 20 12 - 78 U/L    AST (SGOT) 17 15 - 37 U/L    Alk. phosphatase 86 45 - 117 U/L    Protein, total 8.5 (H) 6.4 - 8.2 g/dL    Albumin 4.0 3.5 - 5.0 g/dL    Globulin 4.5 (H) 2.0 - 4.0 g/dL    A-G Ratio 0.9 (L) 1.1 - 2.2     TROPONIN I    Collection Time: 01/26/19  6:57 PM   Result Value Ref Range    Troponin-I, Qt. <0.05 <0.05 ng/mL       Xr Chest Port    Result Date: 1/26/2019  EXAM:  XR CHEST PORT. INDICATION: Passed out at [place]. COMPARISON: 2/26/2018. FINDINGS: PA and lateral radiographs of the chest were obtained. The patient is on a cardiac monitor. Lungs: The lungs are clear of mass, nodule, airspace disease or edema. Pleura: There is no pleural effusion or pneumothorax. Mediastinum: The cardiac silhouette is at the upper limits of normal in size. The aorta is mildly atherosclerotic. Bones and soft tissues: The bones and soft tissues are within normal limits. IMPRESSION: No acute abnormality.

## 2019-01-26 NOTE — ED TRIAGE NOTES
Pt arrives via EMS. EMS reports c/o witnessed syncopal episode from sitting to standing position. EMS reports PACs, PVCs, Couplets and Bigemy. /120 all other VSS. . Hx of CHF. MI in feb. Denies hitting head.

## 2019-01-27 ENCOUNTER — APPOINTMENT (OUTPATIENT)
Dept: VASCULAR SURGERY | Age: 74
DRG: 312 | End: 2019-01-27
Attending: INTERNAL MEDICINE
Payer: MEDICARE

## 2019-01-27 ENCOUNTER — APPOINTMENT (OUTPATIENT)
Dept: MRI IMAGING | Age: 74
DRG: 312 | End: 2019-01-27
Attending: INTERNAL MEDICINE
Payer: MEDICARE

## 2019-01-27 LAB
ALBUMIN SERPL-MCNC: 3.6 G/DL (ref 3.5–5)
ALBUMIN/GLOB SERPL: 0.8 {RATIO} (ref 1.1–2.2)
ALP SERPL-CCNC: 80 U/L (ref 45–117)
ALT SERPL-CCNC: 19 U/L (ref 12–78)
ANION GAP SERPL CALC-SCNC: 7 MMOL/L (ref 5–15)
AST SERPL-CCNC: 18 U/L (ref 15–37)
ATRIAL RATE: 70 BPM
BASOPHILS # BLD: 0.1 K/UL (ref 0–0.1)
BASOPHILS NFR BLD: 1 % (ref 0–1)
BILIRUB SERPL-MCNC: 0.5 MG/DL (ref 0.2–1)
BUN SERPL-MCNC: 32 MG/DL (ref 6–20)
BUN/CREAT SERPL: 29 (ref 12–20)
CALCIUM SERPL-MCNC: 9.3 MG/DL (ref 8.5–10.1)
CALCULATED P AXIS, ECG09: 27 DEGREES
CALCULATED R AXIS, ECG10: -2 DEGREES
CALCULATED T AXIS, ECG11: -38 DEGREES
CHLORIDE SERPL-SCNC: 106 MMOL/L (ref 97–108)
CK MB CFR SERPL CALC: 1.7 % (ref 0–2.5)
CK MB SERPL-MCNC: 1.1 NG/ML (ref 5–25)
CK SERPL-CCNC: 66 U/L (ref 26–192)
CO2 SERPL-SCNC: 27 MMOL/L (ref 21–32)
COMMENT, HOLDF: NORMAL
CREAT SERPL-MCNC: 1.12 MG/DL (ref 0.55–1.02)
D DIMER PPP FEU-MCNC: 0.51 MG/L FEU (ref 0–0.65)
DIAGNOSIS, 93000: NORMAL
DIFFERENTIAL METHOD BLD: ABNORMAL
EOSINOPHIL # BLD: 0.1 K/UL (ref 0–0.4)
EOSINOPHIL NFR BLD: 1 % (ref 0–7)
ERYTHROCYTE [DISTWIDTH] IN BLOOD BY AUTOMATED COUNT: 13.2 % (ref 11.5–14.5)
EST. AVERAGE GLUCOSE BLD GHB EST-MCNC: 137 MG/DL
GLOBULIN SER CALC-MCNC: 4.4 G/DL (ref 2–4)
GLUCOSE BLD STRIP.AUTO-MCNC: 96 MG/DL (ref 65–100)
GLUCOSE BLD STRIP.AUTO-MCNC: 98 MG/DL (ref 65–100)
GLUCOSE BLD STRIP.AUTO-MCNC: 99 MG/DL (ref 65–100)
GLUCOSE SERPL-MCNC: 102 MG/DL (ref 65–100)
HBA1C MFR BLD: 6.4 % (ref 4.2–6.3)
HCT VFR BLD AUTO: 35.3 % (ref 35–47)
HGB BLD-MCNC: 11.6 G/DL (ref 11.5–16)
IMM GRANULOCYTES # BLD AUTO: 0 K/UL (ref 0–0.04)
IMM GRANULOCYTES NFR BLD AUTO: 0 % (ref 0–0.5)
LYMPHOCYTES # BLD: 3 K/UL (ref 0.8–3.5)
LYMPHOCYTES NFR BLD: 33 % (ref 12–49)
MAGNESIUM SERPL-MCNC: 2.4 MG/DL (ref 1.6–2.4)
MCH RBC QN AUTO: 31.6 PG (ref 26–34)
MCHC RBC AUTO-ENTMCNC: 32.9 G/DL (ref 30–36.5)
MCV RBC AUTO: 96.2 FL (ref 80–99)
MONOCYTES # BLD: 0.7 K/UL (ref 0–1)
MONOCYTES NFR BLD: 8 % (ref 5–13)
NEUTS SEG # BLD: 5.2 K/UL (ref 1.8–8)
NEUTS SEG NFR BLD: 58 % (ref 32–75)
NRBC # BLD: 0 K/UL (ref 0–0.01)
NRBC BLD-RTO: 0 PER 100 WBC
P-R INTERVAL, ECG05: 154 MS
PHOSPHATE SERPL-MCNC: 3.7 MG/DL (ref 2.6–4.7)
PLATELET # BLD AUTO: 342 K/UL (ref 150–400)
PMV BLD AUTO: 10.2 FL (ref 8.9–12.9)
POTASSIUM SERPL-SCNC: 3.7 MMOL/L (ref 3.5–5.1)
PROT SERPL-MCNC: 8 G/DL (ref 6.4–8.2)
Q-T INTERVAL, ECG07: 408 MS
QRS DURATION, ECG06: 110 MS
QTC CALCULATION (BEZET), ECG08: 440 MS
RBC # BLD AUTO: 3.67 M/UL (ref 3.8–5.2)
SAMPLES BEING HELD,HOLD: NORMAL
SERVICE CMNT-IMP: NORMAL
SODIUM SERPL-SCNC: 140 MMOL/L (ref 136–145)
TROPONIN I SERPL-MCNC: <0.05 NG/ML
TSH SERPL DL<=0.05 MIU/L-ACNC: 1.83 UIU/ML (ref 0.36–3.74)
VENTRICULAR RATE, ECG03: 70 BPM
WBC # BLD AUTO: 9.1 K/UL (ref 3.6–11)

## 2019-01-27 PROCEDURE — 84443 ASSAY THYROID STIM HORMONE: CPT

## 2019-01-27 PROCEDURE — 83735 ASSAY OF MAGNESIUM: CPT

## 2019-01-27 PROCEDURE — 70544 MR ANGIOGRAPHY HEAD W/O DYE: CPT

## 2019-01-27 PROCEDURE — 84484 ASSAY OF TROPONIN QUANT: CPT

## 2019-01-27 PROCEDURE — 99218 HC RM OBSERVATION: CPT

## 2019-01-27 PROCEDURE — 80053 COMPREHEN METABOLIC PANEL: CPT

## 2019-01-27 PROCEDURE — 82550 ASSAY OF CK (CPK): CPT

## 2019-01-27 PROCEDURE — 83036 HEMOGLOBIN GLYCOSYLATED A1C: CPT

## 2019-01-27 PROCEDURE — 82962 GLUCOSE BLOOD TEST: CPT

## 2019-01-27 PROCEDURE — 36415 COLL VENOUS BLD VENIPUNCTURE: CPT

## 2019-01-27 PROCEDURE — 84100 ASSAY OF PHOSPHORUS: CPT

## 2019-01-27 PROCEDURE — 85379 FIBRIN DEGRADATION QUANT: CPT

## 2019-01-27 PROCEDURE — 74011250636 HC RX REV CODE- 250/636: Performed by: INTERNAL MEDICINE

## 2019-01-27 PROCEDURE — 74011250637 HC RX REV CODE- 250/637: Performed by: INTERNAL MEDICINE

## 2019-01-27 PROCEDURE — 93880 EXTRACRANIAL BILAT STUDY: CPT

## 2019-01-27 PROCEDURE — 70551 MRI BRAIN STEM W/O DYE: CPT

## 2019-01-27 PROCEDURE — 85025 COMPLETE CBC W/AUTO DIFF WBC: CPT

## 2019-01-27 RX ORDER — ASPIRIN 81 MG/1
81 TABLET ORAL DAILY
Status: DISCONTINUED | OUTPATIENT
Start: 2019-01-27 | End: 2019-01-28 | Stop reason: HOSPADM

## 2019-01-27 RX ORDER — LOSARTAN POTASSIUM 50 MG/1
25 TABLET ORAL DAILY
Status: DISCONTINUED | OUTPATIENT
Start: 2019-01-28 | End: 2019-01-28 | Stop reason: HOSPADM

## 2019-01-27 RX ORDER — LOSARTAN POTASSIUM 50 MG/1
50 TABLET ORAL DAILY
Status: DISCONTINUED | OUTPATIENT
Start: 2019-01-27 | End: 2019-01-27

## 2019-01-27 RX ORDER — METOPROLOL SUCCINATE 25 MG/1
12.5 TABLET, EXTENDED RELEASE ORAL DAILY
Status: DISCONTINUED | OUTPATIENT
Start: 2019-01-27 | End: 2019-01-27

## 2019-01-27 RX ORDER — POTASSIUM CHLORIDE 750 MG/1
20 TABLET, FILM COATED, EXTENDED RELEASE ORAL DAILY
Status: DISCONTINUED | OUTPATIENT
Start: 2019-01-27 | End: 2019-01-28 | Stop reason: HOSPADM

## 2019-01-27 RX ORDER — ATORVASTATIN CALCIUM 40 MG/1
40 TABLET, FILM COATED ORAL DAILY
Status: DISCONTINUED | OUTPATIENT
Start: 2019-01-27 | End: 2019-01-28 | Stop reason: HOSPADM

## 2019-01-27 RX ORDER — FUROSEMIDE 40 MG/1
40 TABLET ORAL DAILY
Status: DISCONTINUED | OUTPATIENT
Start: 2019-01-27 | End: 2019-01-28 | Stop reason: HOSPADM

## 2019-01-27 RX ORDER — METOPROLOL SUCCINATE 25 MG/1
12.5 TABLET, EXTENDED RELEASE ORAL
Status: DISCONTINUED | OUTPATIENT
Start: 2019-01-28 | End: 2019-01-28 | Stop reason: HOSPADM

## 2019-01-27 RX ORDER — SODIUM CHLORIDE 0.9 % (FLUSH) 0.9 %
5-40 SYRINGE (ML) INJECTION EVERY 8 HOURS
Status: DISCONTINUED | OUTPATIENT
Start: 2019-01-27 | End: 2019-01-28 | Stop reason: HOSPADM

## 2019-01-27 RX ORDER — HEPARIN SODIUM 5000 [USP'U]/ML
5000 INJECTION, SOLUTION INTRAVENOUS; SUBCUTANEOUS EVERY 8 HOURS
Status: DISCONTINUED | OUTPATIENT
Start: 2019-01-27 | End: 2019-01-28 | Stop reason: HOSPADM

## 2019-01-27 RX ORDER — SODIUM CHLORIDE 0.9 % (FLUSH) 0.9 %
5-40 SYRINGE (ML) INJECTION AS NEEDED
Status: DISCONTINUED | OUTPATIENT
Start: 2019-01-27 | End: 2019-01-28 | Stop reason: HOSPADM

## 2019-01-27 RX ORDER — ONDANSETRON 2 MG/ML
4 INJECTION INTRAMUSCULAR; INTRAVENOUS
Status: DISCONTINUED | OUTPATIENT
Start: 2019-01-27 | End: 2019-01-28 | Stop reason: HOSPADM

## 2019-01-27 RX ORDER — BISACODYL 5 MG
5 TABLET, DELAYED RELEASE (ENTERIC COATED) ORAL DAILY PRN
Status: DISCONTINUED | OUTPATIENT
Start: 2019-01-27 | End: 2019-01-28 | Stop reason: HOSPADM

## 2019-01-27 RX ADMIN — METOPROLOL SUCCINATE 12.5 MG: 25 TABLET, EXTENDED RELEASE ORAL at 10:23

## 2019-01-27 RX ADMIN — ASPIRIN 81 MG: 81 TABLET, COATED ORAL at 10:22

## 2019-01-27 RX ADMIN — LOSARTAN POTASSIUM 50 MG: 50 TABLET ORAL at 10:23

## 2019-01-27 RX ADMIN — POTASSIUM CHLORIDE 20 MEQ: 750 TABLET, FILM COATED, EXTENDED RELEASE ORAL at 10:22

## 2019-01-27 RX ADMIN — FUROSEMIDE 40 MG: 40 TABLET ORAL at 10:22

## 2019-01-27 RX ADMIN — Medication 10 ML: at 05:19

## 2019-01-27 RX ADMIN — Medication 10 ML: at 16:59

## 2019-01-27 RX ADMIN — Medication 1 CAPSULE: at 10:22

## 2019-01-27 RX ADMIN — HEPARIN SODIUM 5000 UNITS: 5000 INJECTION INTRAVENOUS; SUBCUTANEOUS at 02:50

## 2019-01-27 RX ADMIN — ATORVASTATIN CALCIUM 40 MG: 40 TABLET, FILM COATED ORAL at 10:22

## 2019-01-27 RX ADMIN — Medication 10 ML: at 02:50

## 2019-01-27 RX ADMIN — Medication 10 ML: at 22:46

## 2019-01-27 NOTE — H&P
[address] HISTORY AND PHYSICAL Lisandro Hernandez 
MR#: [id number] : 1945 ACCOUNT #: [de-identified] ADMIT DATE: 2019 PRIMARY CARE PHYSICIAN:  Fiona Lewis MD 
 
SOURCE OF INFORMATION:  The patient. CHIEF COMPLAINT:  \"I passed out. \" HISTORY OF PRESENT ILLNESS:  This is a 43-year-old woman with a past medical history significant for coronary artery disease, systolic congestive heart failure, hypertension, dyslipidemia, prediabetic, chronic kidney disease, was in her usual state of health until the day of her presentation at the emergency room when patient passed out. Patient was at [employer] standing and all of a sudden the patient passed out. The episode lasted about 30 seconds. No abnormal body movement was described. No tongue biting, no fecal or urinary incontinence. The patient has no recollection of the event. No prior history of TIA or stroke. The patient was told that during the episode, she was diaphoretic. She was brought to the emergency room for further evaluation. When the patient arrived at the emergency room, the EKG shows bigeminy according to the emergency room physician. Patient was referred to the hospitalist service for evaluation for admission. The patient was last admitted to this hospital from 2018 to 2018. She was admitted with shortness of breath and subsequent evaluation revealed that the patient has systolic congestive heart failure with ejection fraction of 15-20%. Patient was also diagnosed with a non-ST elevation myocardial infarction. The patient was also diagnosed with a non-ST elevation myocardial infarction during that hospitalization and underwent cardiac catheterization with no stent placement. She has been under the care of a cardiologist since then and she was last seen by her cardiologist on 01/15 which was a routine followup.   Before the patient was admitted to this hospital in 2018, the patient had no medical problem before that time. The present episode of syncope is not associated with shortness of breath. No chest pain, no palpitation. PAST MEDICAL HISTORY:  Coronary artery disease, hypertension, dyslipidemia, chronic kidney disease, prediabetes. ALLERGIES:  PATIENT IS ALLERGIC TO ADHESIVE TAPE. MEDICATIONS:  Aspirin 81 mg daily, Lipitor 40 mg daily,  fish oil 1 tablet daily, Lasix 40 mg daily,  Losartan 50 mg daily,  Toprol-XL 25 mg half tablet daily, multivitamin 1 tablet daily, potassium chloride 20 mEq daily. FAMILY HISTORY:  Reviewed. Her mother and father had heart disease. PAST SURGICAL HISTORY:  This is significant for cataract extraction. SOCIAL HISTORY:  No history of alcohol or tobacco abuse. REVIEW OF SYSTEMS: 
HEAD, EYES, EARS, NOSE AND THROAT:  No headache, no dizziness, no blurring of vision, no photophobia. RESPIRATORY:  No cough, no shortness of breath, no hemoptysis. CARDIOVASCULAR:  No chest pain, no orthopnea, no palpitation. GASTROINTESTINAL SYSTEM:  No nausea and vomiting, no diarrhea, no constipation. GENITOURINARY:  No dysuria, no urgency, no frequency. All other systems are reviewed and they are negative. PHYSICAL EXAMINATION: 
GENERAL APPEARANCE:  The patient appeared ill, in moderate distress. VITAL SIGNS:  On arrival at the emergency room, temperature 97.6, pulse 69, respiratory rate 17, blood pressure 180/67, oxygen saturation 99% on room air. HEAD:  Normocephalic, atraumatic. EYES:  Normal eye movement. No redness, no drainage, no discharge. EARS:  Normal external ears with no obvious drainage. NOSE:  No deformity, no drainage. MOUTH AND THROAT:  No visible oral lesions. NECK:  Supple, no JVD, no thyromegaly. CHEST:  Clear breath sounds. No wheezing, no crackles. HEART:  Normal S1 and S2, regular. No clinically appreciable murmur. ABDOMEN:  Soft, nontender, normal bowel sounds. CENTRAL NERVOUS SYSTEM:  Alert, oriented x3. No gross focal neurological deficit. EXTREMITIES:  No edema. Pulses 2+ bilaterally. MUSCULOSKELETAL:  No obvious joint deformity or swelling. SKIN:  No active skin lesions seen in the exposed part of the body. PSYCHIATRIC:  Normal mood and affect. LYMPHATIC SYSTEM:  No cervical lymphadenopathy. DIAGNOSTIC DATA:  EKG shows a sinus rhythm, left ventricular hypertrophy. Chest x-ray:  No acute abnormalities. LABORATORY DATA:  Hematology:  WBC 9.1, hemoglobin 12.3, hematocrit 38.2, platelets 692. Cardiac profile:  Troponin less than 0.05. Chemistry:  Sodium 138, potassium 3.1, chloride 101, CO2 of 28, glucose 100, BUN 38, creatinine 1.26. Calcium 8.9, total bilirubin 0.3, ALT 20, AST 17, alkaline phosphatase 86, total protein 8.5, albumin level 4.0, globulin 4.5, magnesium level 2.5. ASSESSMENT: 
1. Syncope. 2.  Coronary artery disease. 3.  Hypertension. 4.  Dyslipidemia. 5.  Chronic kidney disease stage III. 6.  Hypermagnesemia. 7.  Hypokalemia. 8.  Prediabetes. PLAN: 
1. Syncope. Will place the patient on observation. Will obtain a CT scan of the head, MRI/MRA of the brain, echocardiogram, carotid ultrasound to evaluate the patient for stroke as a possible cause of syncope. We will check cardiac markers to rule out acute myocardial infarction as a possible cause of syncope. We will check a TSH level. Will also check a D-dimer to evaluate the patient for thromboembolism as a possible cause of syncope. Will check orthostatic vital signs. 2.  Coronary artery disease. Will check cardiac markers to rule out acute myocardial infarction as stated above. Will continue with cardiac medication. Because of the bigeminy reported by the emergency room physician patient's cardiologist will be consulted to assist in further evaluation and treatment.   Will also await the results of echocardiogram. 
 3.  Hypertension. Will resume preadmission medication. Will monitor the patient's blood pressure closely. 4.  Dyslipidemia. Will continue with preadmission medication. 5.  Chronic kidney disease. Will continue to monitor the patient's renal function. Will avoid nephrotoxic medication. 6.  Hypermagnesemia. Will repeat magnesium level. Will avoid magnesium supplement. 7.  Hypokalemia. Will continue with potassium supplement. Repeat potassium level. 8.  Prediabetes. In February the patient's A1c was 6.4. Will repeat hemoglobin A1c level. 9.  Other issues:  CODE STATUS:  THE PATIENT IS A FULL CODE. Will place the patient on heparin for DVT prophylaxis. MD ALDA Abreu/JESS 
D: 01/26/2019 21:48    
T: 01/27/2019 04:55 JOB #: [id number] CC: Kelli Torres MD

## 2019-01-27 NOTE — CONSULTS
Date of  Admission: 1/26/2019  6:35 PM     Mohamud Schwarz is a 68 y.o. female admitted for Syncope and collapse  Subjective: This is a 71-year-old woman with a past medical history significant for coronary artery disease, systolic congestive heart failure, hypertension, dyslipidemia, prediabetic, chronic kidney disease, was in her usual state of health until the day of her presentation at the emergency room when patient passed out. Patient was at [employer] standing and all of a sudden the patient passed out. The episode lasted about 30 seconds. No abnormal body movement was described. No tongue biting, no fecal or urinary incontinence. The patient has no recollection of the event but maybe she was a bit dizzy prior. no cp or sob reported\  . No prior history of TIA or stroke. The patient was told that during the episode, she was diaphoretic. She was brought to the emergency room for further evaluation. When the patient arrived at the emergency room, the EKG shows bigeminy according to the emergency room physician. Currently feeling fine            Assessment/Plan: possible episode of orthostatic hypotension . decrease cozaar to 25 mg in am and change toprol to  qhs  Her ECG seems unchanged with NSR and LBBB and she has ruled out for mi   no evidence of complex rhythm issues thus far in hospital but w have discussed again potential for aicd. She seems not interested still but he tells me she will discuss with her childreen.  Echo ordered by hospitalist and Martin Hurley done but she already had echo on 1/15 /19 with ef of 35-40%   NO EVIDENCE FOR ACUTE ISCHEMIA AND SHE SEEMS COMPENSATED FROM CMP STANDPOINT AT [facility]  Patient Active Problem List    Diagnosis Date Noted    Syncope and collapse 01/26/2019    Type 2 diabetes with nephropathy ([facility] [address] 05/30/2018    CHF NYHA class II, acute, systolic ([address] 10/80/4050    Coronary artery disease involving native coronary artery of native heart without angina pectoris 04/17/2018    Dyslipidemia, goal LDL below 70 04/17/2018    LBBB (left bundle branch block) 04/17/2018    Diabetes mellitus ([facility] [address] 04/17/2018    Hypokalemia 03/01/2018    Pre-diabetes 03/01/2018    HTN (hypertension) 03/01/2018    Hypercholesteremia 10/50/5699    Acute systolic heart failure ([facility] [address] 02/27/2018    Acute metabolic encephalopathy 59/60/2120    NSTEMI (non-ST elevated myocardial infarction) ([facility] [address] 02/26/2018    Acute respiratory failure with hypoxia ([facility] 02/26/2018      Bree Berger MD  Past Medical History:   Diagnosis Date    CAD (coronary artery disease) 02/26/2018    NSTEMI    Congestive heart failure ([facility] 02/26/2018    Diabetes ([facility] 02/26/2018    pre-DM    Hypertension       Past Surgical History:   Procedure Laterality Date    HX HEENT      cataracts     Allergies   Allergen Reactions    Adhesive Tape-Silicones Rash      Family History   Problem Relation Age of Onset    Heart Disease Mother     Heart Disease Father 62        MI    Diabetes Sister     Heart Disease Brother 71        CABG    Cancer Maternal Grandmother     Pulmonary Embolism Paternal Grandmother     Heart Disease Paternal Grandfather     Heart Disease Brother 59        CABG    Cancer Brother       Current Facility-Administered Medications   Medication Dose Route Frequency    aspirin delayed-release tablet 81 mg  81 mg Oral DAILY    atorvastatin (LIPITOR) tablet 40 mg  40 mg Oral DAILY    fish oil-omega-3 fatty acids 340-1,000 mg capsule 1 Cap  1 Cap Oral DAILY    furosemide (LASIX) tablet 40 mg  40 mg Oral DAILY    losartan (COZAAR) tablet 50 mg  50 mg Oral DAILY    metoprolol succinate (TOPROL-XL) XL tablet 12.5 mg  12.5 mg Oral DAILY    potassium chloride SR (KLOR-CON 10) tablet 20 mEq  20 mEq Oral DAILY    sodium chloride (NS) flush 5-40 mL  5-40 mL IntraVENous Q8H    sodium chloride (NS) flush 5-40 mL  5-40 mL IntraVENous PRN    ondansetron (ZOFRAN) injection 4 mg  4 mg IntraVENous Q4H PRN    bisacodyl (DULCOLAX) tablet 5 mg  5 mg Oral DAILY PRN    heparin (porcine) injection 5,000 Units  5,000 Units SubCUTAneous Q8H    acetaminophen (TYLENOL) tablet 650 mg  650 mg Oral Q4H PRN         Review of Symptoms:  A comprehensive review of systems was negative. Physical Exam    Visit Vitals  /63 (BP 1 Location: Right arm, BP Patient Position: At rest)   Pulse 79   Temp 98.5 °F (36.9 °C)   Resp 13   Wt 67.1 kg (148 lb)   SpO2 98%   BMI 26.22 kg/m²     Neck: no carotid bruit and no JVD  Heart: regular rate and rhythm  Lungs: clear to auscultation bilaterally  Abdomen: soft, non-tender. Bowel sounds normal. No masses,  no organomegaly  Extremities: no edema    Cardiographics    Telemetry: normal sinus rhythm  ECG: normal sinus rhythm, LBBB  Echocardiogram: Not done    Recent radiology, intake/output and wt reviewed    Labs:   Recent Results (from the past 24 hour(s))   EKG, 12 LEAD, INITIAL    Collection Time: 01/26/19  6:45 PM   Result Value Ref Range    Ventricular Rate 70 BPM    Atrial Rate 70 BPM    P-R Interval 154 ms    QRS Duration 110 ms    Q-T Interval 408 ms    QTC Calculation (Bezet) 440 ms    Calculated P Axis 27 degrees    Calculated R Axis -2 degrees    Calculated T Axis -38 degrees    Diagnosis       Sinus rhythm with premature atrial complexes  Left ventricular hypertrophy with repolarization abnormality  Left bundle branch block  When compared with ECG of 26-FEB-2018 01:27,  premature atrial complexes are now present    Confirmed by Susan Vasques MD. ([id number]) on 1/27/2019 9:39:24 AM     SAMPLES BEING HELD    Collection Time: 01/26/19  6:57 PM   Result Value Ref Range    SAMPLES BEING HELD 1RED,1BLUE     COMMENT        Add-on orders for these samples will be processed based on acceptable specimen integrity and analyte stability, which may vary by analyte.    CBC WITH AUTOMATED DIFF    Collection Time: 01/26/19  6:57 PM   Result Value Ref Range    WBC 9.1 3.6 - 11.0 K/uL    RBC 3. 94 3.80 - 5.20 M/uL    HGB 12.3 11.5 - 16.0 g/dL    HCT 38.2 35.0 - 47.0 %    MCV 97.0 80.0 - 99.0 FL    MCH 31.2 26.0 - 34.0 PG    MCHC 32.2 30.0 - 36.5 g/dL    RDW 13.2 11.5 - 14.5 %    PLATELET 772 031 - 486 K/uL    MPV 10.2 8.9 - 12.9 FL    NRBC 0.0 0  WBC    ABSOLUTE NRBC 0.00 0.00 - 0.01 K/uL    NEUTROPHILS 48 32 - 75 %    LYMPHOCYTES 41 12 - 49 %    MONOCYTES 8 5 - 13 %    EOSINOPHILS 2 0 - 7 %    BASOPHILS 1 0 - 1 %    IMMATURE GRANULOCYTES 0 0.0 - 0.5 %    ABS. NEUTROPHILS 4.4 1.8 - 8.0 K/UL    ABS. LYMPHOCYTES 3.7 (H) 0.8 - 3.5 K/UL    ABS. MONOCYTES 0.8 0.0 - 1.0 K/UL    ABS. EOSINOPHILS 0.2 0.0 - 0.4 K/UL    ABS. BASOPHILS 0.1 0.0 - 0.1 K/UL    ABS. IMM. GRANS. 0.0 0.00 - 0.04 K/UL    DF AUTOMATED     METABOLIC PANEL, COMPREHENSIVE    Collection Time: 01/26/19  6:57 PM   Result Value Ref Range    Sodium 138 136 - 145 mmol/L    Potassium 3.1 (L) 3.5 - 5.1 mmol/L    Chloride 101 97 - 108 mmol/L    CO2 28 21 - 32 mmol/L    Anion gap 9 5 - 15 mmol/L    Glucose 100 65 - 100 mg/dL    BUN 38 (H) 6 - 20 MG/DL    Creatinine 1.26 (H) 0.55 - 1.02 MG/DL    BUN/Creatinine ratio 30 (H) 12 - 20      GFR est AA 51 (L) >60 ml/min/1.73m2    GFR est non-AA 42 (L) >60 ml/min/1.73m2    Calcium 8.9 8.5 - 10.1 MG/DL    Bilirubin, total 0.3 0.2 - 1.0 MG/DL    ALT (SGPT) 20 12 - 78 U/L    AST (SGOT) 17 15 - 37 U/L    Alk.  phosphatase 86 45 - 117 U/L    Protein, total 8.5 (H) 6.4 - 8.2 g/dL    Albumin 4.0 3.5 - 5.0 g/dL    Globulin 4.5 (H) 2.0 - 4.0 g/dL    A-G Ratio 0.9 (L) 1.1 - 2.2     TROPONIN I    Collection Time: 01/26/19  6:57 PM   Result Value Ref Range    Troponin-I, Qt. <0.05 <0.05 ng/mL   MAGNESIUM    Collection Time: 01/26/19  6:57 PM   Result Value Ref Range    Magnesium 2.5 (H) 1.6 - 2.4 mg/dL   GLUCOSE, POC    Collection Time: 01/26/19 10:08 PM   Result Value Ref Range    Glucose (POC) 106 (H) 65 - 100 mg/dL    Performed by [facility] [facility]    Collection Time: 01/27/19  2:27 AM   Result Value Ref Range    Sodium 140 136 - 145 mmol/L    Potassium 3.7 3.5 - 5.1 mmol/L    Chloride 106 97 - 108 mmol/L    CO2 27 21 - 32 mmol/L    Anion gap 7 5 - 15 mmol/L    Glucose 102 (H) 65 - 100 mg/dL    BUN 32 (H) 6 - 20 MG/DL    Creatinine 1.12 (H) 0.55 - 1.02 MG/DL    BUN/Creatinine ratio 29 (H) 12 - 20      GFR est AA 58 (L) >60 ml/min/1.73m2    GFR est non-AA 48 (L) >60 ml/min/1.73m2    Calcium 9.3 8.5 - 10.1 MG/DL    Bilirubin, total 0.5 0.2 - 1.0 MG/DL    ALT (SGPT) 19 12 - 78 U/L    AST (SGOT) 18 15 - 37 U/L    Alk. phosphatase 80 45 - 117 U/L    Protein, total 8.0 6.4 - 8.2 g/dL    Albumin 3.6 3.5 - 5.0 g/dL    Globulin 4.4 (H) 2.0 - 4.0 g/dL    A-G Ratio 0.8 (L) 1.1 - 2.2     CBC WITH AUTOMATED DIFF    Collection Time: 01/27/19  2:27 AM   Result Value Ref Range    WBC 9.1 3.6 - 11.0 K/uL    RBC 3.67 (L) 3.80 - 5.20 M/uL    HGB 11.6 11.5 - 16.0 g/dL    HCT 35.3 35.0 - 47.0 %    MCV 96.2 80.0 - 99.0 FL    MCH 31.6 26.0 - 34.0 PG    MCHC 32.9 30.0 - 36.5 g/dL    RDW 13.2 11.5 - 14.5 %    PLATELET 792 501 - 622 K/uL    MPV 10.2 8.9 - 12.9 FL    NRBC 0.0 0  WBC    ABSOLUTE NRBC 0.00 0.00 - 0.01 K/uL    NEUTROPHILS 58 32 - 75 %    LYMPHOCYTES 33 12 - 49 %    MONOCYTES 8 5 - 13 %    EOSINOPHILS 1 0 - 7 %    BASOPHILS 1 0 - 1 %    IMMATURE GRANULOCYTES 0 0.0 - 0.5 %    ABS. NEUTROPHILS 5.2 1.8 - 8.0 K/UL    ABS. LYMPHOCYTES 3.0 0.8 - 3.5 K/UL    ABS. MONOCYTES 0.7 0.0 - 1.0 K/UL    ABS. EOSINOPHILS 0.1 0.0 - 0.4 K/UL    ABS. BASOPHILS 0.1 0.0 - 0.1 K/UL    ABS. IMM.  GRANS. 0.0 0.00 - 0.04 K/UL    DF AUTOMATED     TSH 3RD GENERATION    Collection Time: 01/27/19  2:27 AM   Result Value Ref Range    TSH 1.83 0.36 - 3.74 uIU/mL   D DIMER    Collection Time: 01/27/19  2:27 AM   Result Value Ref Range    D-dimer 0.51 0.00 - 0.65 mg/L FEU   TROPONIN I    Collection Time: 01/27/19  2:27 AM   Result Value Ref Range    Troponin-I, Qt. <0.05 <0.05 ng/mL   PHOSPHORUS    Collection Time: 01/27/19  2:27 AM Result Value Ref Range    Phosphorus 3.7 2.6 - 4.7 MG/DL   MAGNESIUM    Collection Time: 01/27/19  2:27 AM   Result Value Ref Range    Magnesium 2.4 1.6 - 2.4 mg/dL   CK W/ CKMB & INDEX    Collection Time: 01/27/19  2:27 AM   Result Value Ref Range    CK 66 26 - 192 U/L    CK - MB 1.1 <3.6 NG/ML    CK-MB Index 1.7 0 - 2.5     SAMPLES BEING HELD    Collection Time: 01/27/19  2:27 AM   Result Value Ref Range    SAMPLES BEING HELD 1RED     COMMENT        Add-on orders for these samples will be processed based on acceptable specimen integrity and analyte stability, which may vary by analyte.    HEMOGLOBIN A1C WITH EAG    Collection Time: 01/27/19  2:27 AM   Result Value Ref Range    Hemoglobin A1c 6.4 (H) 4.2 - 6.3 %    Est. average glucose 137 mg/dL   GLUCOSE, POC    Collection Time: 01/27/19  7:12 AM   Result Value Ref Range    Glucose (POC) 98 65 - 100 mg/dL    Performed by Samira Tolliver

## 2019-01-27 NOTE — ROUTINE PROCESS
TRANSFER - OUT REPORT:    Verbal report given to [facility] (name) on Rojas Prieto  being transferred to [place] [id number] (unit) for routine progression of care       Report consisted of patients Situation, Background, Assessment and   Recommendations(SBAR). Information from the following report(s) SBAR, ED Summary, [facility] and Recent Results was reviewed with the receiving nurse. Lines:   Peripheral IV 01/26/19 Left Antecubital (Active)   Site Assessment Clean, dry, & intact 1/26/2019  6:56 PM   Phlebitis Assessment 0 1/26/2019  6:56 PM   Infiltration Assessment 0 1/26/2019  6:56 PM   Dressing Status Clean, dry, & intact 1/26/2019  6:56 PM   Dressing Type Transparent 1/26/2019  6:56 PM   Hub Color/Line Status Pink;Flushed 1/26/2019  6:56 PM   Action Taken Blood drawn 1/26/2019  6:56 PM        Opportunity for questions and clarification was provided.       Patient transported with:   Monitor  Tech

## 2019-01-27 NOTE — PROGRESS NOTES
Hospitalist Progress Note  Cullen Luevano MD  Answering service: [phone] from in house phone  Cell:       Date of Service:  2019  NAME:  Ruchi Carrillo  :    MRN:  [id number]      Admission Summary: This is a 73-year-old woman with a past medical history significant for coronary artery disease, systolic congestive heart failure, hypertension, dyslipidemia, prediabetic, chronic kidney disease, was in her usual state of health until the day of her presentation at the emergency room when patient passed out. Patient was at [employer] standing and all of a sudden the patient passed out. The episode lasted about 30 seconds. No abnormal body movement was described. No tongue biting, no fecal or urinary incontinence. The patient has no recollection of the event. No prior history of TIA or stroke. The patient was told that during the episode, she was diaphoretic. She was brought to the emergency room for further evaluation. When the patient arrived at the emergency room, the EKG shows bigeminy according to the emergency room physician. Patient was referred to the hospitalist service for evaluation for admission. The patient was last admitted to this hospital from 2018 to 2018. She was admitted with shortness of breath and subsequent evaluation revealed that the patient has systolic congestive heart failure with ejection fraction of 15-20%. Patient was also diagnosed with a non-ST elevation myocardial infarction. The patient was also diagnosed with a non-ST elevation myocardial infarction during that hospitalization and underwent cardiac catheterization with no stent placement. She has been under the care of a cardiologist since then and she was last seen by her cardiologist on 01/15 which was a routine followup.   Before the patient was admitted to this hospital in 2018, the patient had no medical problem before that time. The present episode of syncope is not associated with shortness of breath. No chest pain, no palpitation. Interval history / Subjective:     Reports feeling well today. Says this happened previously to her about a week ago. Assessment & Plan:     1. Syncope: Possibly vasovagal.  Less likely cardiogenic. TSH and D-dimer are WNL.  - Obtain MRI/MRA of brain, echo and carotids. -Vitals and place on tele. -Cards to see as well. 2. HTN: Monitor vitals. Resume meds. 3.CKD 3: Stable and volume status is adequate. 4. HLD: On a statin. Dispo: Likely home in 24hrs once studies are completed. Cards to see. Code status: Full Code  DVT prophylaxis: SCDs    Care Plan discussed with: Patient/Family  Disposition: Home w/Family and TBD     Hospital Problems  Date Reviewed: 1/27/2019          Codes Class Noted POA    * (Principal) Syncope and collapse ICD-10-CM: R55  ICD-9-CM: 780.2  1/26/2019 Yes                Review of Systems:   A comprehensive review of systems was negative except for that written in the HPI. Vital Signs:    Last 24hrs VS reviewed since prior progress note. Most recent are:  Visit Vitals  /63 (BP 1 Location: Right arm, BP Patient Position: At rest)   Pulse 79   Temp 98.5 °F (36.9 °C)   Resp 13   Wt 67.1 kg (148 lb)   SpO2 98%   BMI 26.22 kg/m²       No intake or output data in the 24 hours ending 01/27/19 1153     Physical Examination:             Constitutional:  No acute distress, cooperative, pleasant    ENT:  Oral mucous moist, oropharynx benign. Neck supple,    Resp:  CTA bilaterally. No wheezing/rhonchi/rales. No accessory muscle use   CV:  Regular rhythm, normal rate, no murmurs, gallops, rubs    GI:  Soft, non distended, non tender. normoactive bowel sounds, no hepatosplenomegaly     Musculoskeletal:  No edema, warm, 2+ pulses throughout    Neurologic:  Moves all extremities.   AAOx3, CN II-XII reviewed     Skin:  Good turgor, no rashes or ulcers       Data Review:    Review and/or order of clinical lab test      Labs:     Recent Labs     01/27/19 0227 01/26/19 1857   WBC 9.1 9.1   HGB 11.6 12.3   HCT 35.3 38.2    362     Recent Labs     01/27/19 0227 01/26/19 1857    138   K 3.7 3.1*    101   CO2 27 28   BUN 32* 38*   CREA 1.12* 1.26*   * 100   CA 9.3 8.9   MG 2.4 2.5*   PHOS 3.7  --      Recent Labs     01/27/19 0227 01/26/19 1857   SGOT 18 17   ALT 19 20   AP 80 86   TBILI 0.5 0.3   TP 8.0 8.5*   ALB 3.6 4.0   GLOB 4.4* 4.5*     No results for input(s): INR, PTP, APTT in the last 72 hours. No lab exists for component: INREXT   No results for input(s): FE, TIBC, PSAT, FERR in the last 72 hours. No results found for: FOL, RBCF   No results for input(s): PH, PCO2, PO2 in the last 72 hours.   Recent Labs     01/27/19 0227 01/26/19 1857   CPK 66  --    CKNDX 1.7  --    TROIQ <0.05 <0.05     Lab Results   Component Value Date/Time    Cholesterol, total 286 (H) 02/26/2018 01:27 AM    HDL Cholesterol 76 02/26/2018 01:27 AM    LDL, calculated 185.4 (H) 02/26/2018 01:27 AM    Triglyceride 123 02/26/2018 01:27 AM    CHOL/HDL Ratio 3.8 02/26/2018 01:27 AM     Lab Results   Component Value Date/Time    Glucose (POC) 96 01/27/2019 11:27 AM    Glucose (POC) 98 01/27/2019 07:12 AM    Glucose (POC) 106 (H) 01/26/2019 10:08 PM    Glucose (POC) 114 (H) 03/01/2018 07:06 AM    Glucose (POC) 126 (H) 02/28/2018 09:01 PM     No results found for: COLOR, APPRN, SPGRU, REFSG, KAZ, PROTU, GLUCU, KETU, BILU, UROU, ROMEL, LEUKU, GLUKE, EPSU, BACTU, WBCU, RBCU, CASTS, UCRY      Medications Reviewed:     Current Facility-Administered Medications   Medication Dose Route Frequency    aspirin delayed-release tablet 81 mg  81 mg Oral DAILY    atorvastatin (LIPITOR) tablet 40 mg  40 mg Oral DAILY    fish oil-omega-3 fatty acids 340-1,000 mg capsule 1 Cap  1 Cap Oral DAILY    furosemide (LASIX) tablet 40 mg  40 mg Oral DAILY    losartan (COZAAR) tablet 50 mg  50 mg Oral DAILY    metoprolol succinate (TOPROL-XL) XL tablet 12.5 mg  12.5 mg Oral DAILY    potassium chloride SR (KLOR-CON 10) tablet 20 mEq  20 mEq Oral DAILY    sodium chloride (NS) flush 5-40 mL  5-40 mL IntraVENous Q8H    sodium chloride (NS) flush 5-40 mL  5-40 mL IntraVENous PRN    ondansetron (ZOFRAN) injection 4 mg  4 mg IntraVENous Q4H PRN    bisacodyl (DULCOLAX) tablet 5 mg  5 mg Oral DAILY PRN    heparin (porcine) injection 5,000 Units  5,000 Units SubCUTAneous Q8H    acetaminophen (TYLENOL) tablet 650 mg  650 mg Oral Q4H PRN     ______________________________________________________________________  EXPECTED LENGTH OF STAY: - - -  ACTUAL LENGTH OF STAY:          0                 Cullen Luevano MD

## 2019-01-27 NOTE — PROGRESS NOTES
Pt blood pressure done laying, sitting, standing, but denied feeling dizzy or weak.     01/27/19 0200   Vitals   Temp 98.7 °F (37.1 °C)   Temp Source Oral   Pulse (Heart Rate) 67   Heart Rate Source Monitor   Resp Rate 19   O2 Sat (%) 97 %   Level of Consciousness Alert   /57   MAP (Calculated) 89   BP 1 Location Right arm   BP 1 Method Automatic   BP Patient Position At rest   MEWS Score 1   Additional Blood Pressure/Pulse Data   Pulse 2 63   BP 2 139/66   MAP 2 (Calculated) 90   Patient Position 2 Sitting   Pulse 3 81   BP 3 113/65   MAP 3 (Calculated) 81   BP 3 Location Right arm   Patient Position 3 Standing   Height/Weight   Weight 67.1 kg (148 lb)   Weight Source Estimated

## 2019-01-28 VITALS
DIASTOLIC BLOOD PRESSURE: 65 MMHG | TEMPERATURE: 98.2 F | SYSTOLIC BLOOD PRESSURE: 167 MMHG | HEART RATE: 65 BPM | OXYGEN SATURATION: 97 % | RESPIRATION RATE: 16 BRPM | WEIGHT: 146.8 LBS | BODY MASS INDEX: 26 KG/M2

## 2019-01-28 PROBLEM — R55 SYNCOPE: Status: ACTIVE | Noted: 2019-01-28

## 2019-01-28 LAB
LEFT CCA DIST DIAS: 14.24 CM/S
LEFT CCA DIST SYS: 52.92 CM/S
LEFT CCA PROX DIAS: 11.77 CM/S
LEFT CCA PROX SYS: 43.05 CM/S
LEFT ECA DIAS: 66 CM/S
LEFT ECA SYS: 374 CM/S
LEFT ICA DIST DIAS: 0 CM/S
LEFT ICA DIST SYS: 21.65 CM/S
LEFT ICA MID DIAS: 0 CM/S
LEFT ICA MID SYS: 23.29 CM/S
LEFT ICA PROX DIAS: 1.07 CM/S
LEFT ICA PROX SYS: 20.82 CM/S
LEFT ICA/CCA SYS: 0.44
LEFT VERTEBRAL DIAS: 20.04 CM/S
LEFT VERTEBRAL SYS: 62.61 CM/S
RIGHT CCA DIST DIAS: 17.14 CM/S
RIGHT CCA DIST SYS: 63.3 CM/S
RIGHT CCA PROX DIAS: 18.74 CM/S
RIGHT CCA PROX SYS: 73.67 CM/S
RIGHT ECA DIAS: 44 CM/S
RIGHT ECA SYS: 253 CM/S
RIGHT ICA DIST DIAS: 22.35 CM/S
RIGHT ICA DIST SYS: 64.18 CM/S
RIGHT ICA MID DIAS: 27.33 CM/S
RIGHT ICA MID SYS: 114.53 CM/S
RIGHT ICA PROX DIAS: 242.73 CM/S
RIGHT ICA PROX SYS: 580.03 CM/S
RIGHT ICA/CCA SYS: 9.16
RIGHT VERTEBRAL DIAS: 29.67 CM/S
RIGHT VERTEBRAL SYS: 73.63 CM/S

## 2019-01-28 PROCEDURE — 74011250637 HC RX REV CODE- 250/637: Performed by: INTERNAL MEDICINE

## 2019-01-28 PROCEDURE — 99218 HC RM OBSERVATION: CPT

## 2019-01-28 PROCEDURE — 74011250637 HC RX REV CODE- 250/637: Performed by: SPECIALIST

## 2019-01-28 PROCEDURE — 65660000000 HC RM CCU STEPDOWN

## 2019-01-28 RX ORDER — METOPROLOL SUCCINATE 25 MG/1
12.5 TABLET, EXTENDED RELEASE ORAL
Qty: 30 TAB | Refills: 1 | Status: SHIPPED | OUTPATIENT
Start: 2019-01-28 | End: 2022-06-23 | Stop reason: SDUPTHER

## 2019-01-28 RX ORDER — LOSARTAN POTASSIUM 25 MG/1
25 TABLET ORAL DAILY
Qty: 30 TAB | Refills: 1 | Status: SHIPPED | OUTPATIENT
Start: 2019-01-29 | End: 2019-04-23 | Stop reason: ALTCHOICE

## 2019-01-28 RX ADMIN — Medication 10 ML: at 07:20

## 2019-01-28 RX ADMIN — LOSARTAN POTASSIUM 25 MG: 50 TABLET ORAL at 08:21

## 2019-01-28 RX ADMIN — ASPIRIN 81 MG: 81 TABLET, COATED ORAL at 08:19

## 2019-01-28 RX ADMIN — ATORVASTATIN CALCIUM 40 MG: 40 TABLET, FILM COATED ORAL at 08:19

## 2019-01-28 RX ADMIN — FUROSEMIDE 40 MG: 40 TABLET ORAL at 08:19

## 2019-01-28 RX ADMIN — POTASSIUM CHLORIDE 20 MEQ: 750 TABLET, FILM COATED, EXTENDED RELEASE ORAL at 08:19

## 2019-01-28 RX ADMIN — Medication 1 CAPSULE: at 08:19

## 2019-01-28 NOTE — PROGRESS NOTES
Date of  Admission: 1/26/2019  6:35 PM     Red Benjamin is a 68 y.o. female admitted for Syncope and collapse  Subjective: This is a 69-year-old woman with a past medical history significant for coronary artery disease, systolic congestive heart failure, hypertension, dyslipidemia, prediabetic, chronic kidney disease, was in her usual state of health until the day of her presentation at the emergency room when patient passed out. Patient was at [employer] standing and all of a sudden the patient passed out. The episode lasted about 30 seconds. No abnormal body movement was described. No tongue biting, no fecal or urinary incontinence. The patient has no recollection of the event but maybe she was a bit dizzy prior. no cp or sob reported\  . No prior history of TIA or stroke. The patient was told that during the episode, she was diaphoretic. She was brought to the emergency room for further evaluation. When the patient arrived at the emergency room, the EKG shows bigeminy according to the emergency room physician. Currently feeling fine    This morning seems to be much better. No dizziness no chest pain no dyspnea. Med changes made. Seems to be ok this morning. OK for discharge from cardiac standpoint. Watch Bp at home. Follow-up with PCP in 2 days, with me as scheduled in April        Assessment/Plan: possible episode of orthostatic hypotension . decrease cozaar to 25 mg in am and change toprol to  qhs  Her ECG seems unchanged with NSR and LBBB and she has ruled out for mi   no evidence of complex rhythm issues thus far in hospital but w have discussed again potential for aicd. She seems not interested still but he tells me she will discuss with her childreen.  Echo ordered by hospitalist and Alin Hernandez done but she already had echo on 1/15 /19 with ef of 35-40%   NO EVIDENCE FOR ACUTE ISCHEMIA AND SHE SEEMS COMPENSATED FROM CMP STANDPOINT AT THIS TIME  Patient Active Problem List    Diagnosis Date Noted    Syncope and collapse 01/26/2019    Type 2 diabetes with nephropathy ([facility] 05/30/2018    CHF NYHA class II, acute, systolic ([address] 36/41/5573    Coronary artery disease involving native coronary artery of native heart without angina pectoris 04/17/2018    Dyslipidemia, goal LDL below 70 04/17/2018    LBBB (left bundle branch block) 04/17/2018    Diabetes mellitus ([address] 04/17/2018    Hypokalemia 03/01/2018    Pre-diabetes 03/01/2018    HTN (hypertension) 03/01/2018    Hypercholesteremia 69/99/4064    Acute systolic heart failure ([address] 02/27/2018    Acute metabolic encephalopathy 23/05/4275    NSTEMI (non-ST elevated myocardial infarction) ([facility] 02/26/2018    Acute respiratory failure with hypoxia ([facility] 02/26/2018      Juan Carlos Dai MD  Past Medical History:   Diagnosis Date    CAD (coronary artery disease) 02/26/2018    NSTEMI    Congestive heart failure ([address] 02/26/2018    Diabetes ([facility] 02/26/2018    pre-DM    Hypertension       Past Surgical History:   Procedure Laterality Date    HX HEENT      cataracts     Allergies   Allergen Reactions    Adhesive Tape-Silicones Rash      Family History   Problem Relation Age of Onset    Heart Disease Mother     Heart Disease Father 62        MI    Diabetes Sister     Heart Disease Brother 71        CABG    Cancer Maternal Grandmother     Pulmonary Embolism Paternal Grandmother     Heart Disease Paternal Grandfather     Heart Disease Brother 59        CABG    Cancer Brother       Current Facility-Administered Medications   Medication Dose Route Frequency    aspirin delayed-release tablet 81 mg  81 mg Oral DAILY    atorvastatin (LIPITOR) tablet 40 mg  40 mg Oral DAILY    fish oil-omega-3 fatty acids 340-1,000 mg capsule 1 Cap  1 Cap Oral DAILY    furosemide (LASIX) tablet 40 mg  40 mg Oral DAILY    potassium chloride SR (KLOR-CON 10) tablet 20 mEq  20 mEq Oral DAILY    sodium chloride (NS) flush 5-40 mL  5-40 mL IntraVENous Q8H    sodium chloride (NS) flush 5-40 mL  5-40 mL IntraVENous PRN    ondansetron (ZOFRAN) injection 4 mg  4 mg IntraVENous Q4H PRN    bisacodyl (DULCOLAX) tablet 5 mg  5 mg Oral DAILY PRN    heparin (porcine) injection 5,000 Units  5,000 Units SubCUTAneous Q8H    metoprolol succinate (TOPROL-XL) XL tablet 12.5 mg  12.5 mg Oral QHS    losartan (COZAAR) tablet 25 mg  25 mg Oral DAILY    acetaminophen (TYLENOL) tablet 650 mg  650 mg Oral Q4H PRN         Review of Symptoms:  A comprehensive review of systems was negative. Physical Exam    Visit Vitals  /72   Pulse 75   Temp 98.3 °F (36.8 °C)   Resp 16   Wt 146 lb 12.8 oz (66.6 kg)   SpO2 97%   BMI 26.00 kg/m²     Neck: no carotid bruit and no JVD  Heart: regular rate and rhythm  Lungs: clear to auscultation bilaterally  Abdomen: soft, non-tender.  Bowel sounds normal. No masses,  no organomegaly  Extremities: no edema    Cardiographics    Telemetry: normal sinus rhythm  ECG: normal sinus rhythm, LBBB  Echocardiogram: Not done    Recent radiology, intake/output and wt reviewed    Labs:   Recent Results (from the past 24 hour(s))   GLUCOSE, POC    Collection Time: 01/27/19 11:27 AM   Result Value Ref Range    Glucose (POC) 96 65 - 100 mg/dL    Performed by Mariana Puga    GLUCOSE, POC    Collection Time: 01/27/19  4:44 PM   Result Value Ref Range    Glucose (POC) 99 65 - 100 mg/dL    Performed by Mariana Puga    DUPLEX CAROTID BILATERAL    Collection Time: 01/27/19  5:07 PM   Result Value Ref Range    Right cca dist sys 63.30 cm/s    Right CCA dist ibarra 17.14 cm/s    Right CCA prox sys 73.67 cm/s    Right CCA prox ibarra 18.74 cm/s    Right ICA dist sys 64.18 cm/s    Right ICA dist ibarra 22.35 cm/s    Right ICA mid sys 114.53 cm/s    Right ICA mid ibarra 27.33 cm/s    Right ICA prox sys 580.03 cm/s    Right ICA prox ibarra 242.73 cm/s    Right vertebral sys 73.63 cm/s    RIGHT VERTEBRAL ARTERY D 29.67 cm/s    Right ICA/CCA sys 9.16     Left CCA dist sys 52.92 cm/s Left CCA dist ibarra 14.24 cm/s    Left CCA prox sys 43.05 cm/s    Left CCA prox ibarra 11.77 cm/s    Left ICA dist sys 21.65 cm/s    Left ICA dist ibarra 0.00 cm/s    Left ICA mid sys 23.29 cm/s    Left ICA mid ibarra 0.00 cm/s    Left ICA prox sys 20.82 cm/s    Left ICA prox ibarra 1.07 cm/s    Left vertebral sys 62.61 cm/s    LEFT VERTEBRAL ARTERY D 20.04 cm/s    Left ICA/CCA sys 0.44     Right eca sys 253.0 cm/s    Left ECA sys 374.0 cm/s    RIGHT EXTERNAL CAROTID ARTERY D 44.0 cm/s    LEFT EXTERNAL CAROTID ARTERY D 66.0 cm/s

## 2019-01-28 NOTE — PHYSICIAN ADVISORY
Letter of Status Determination:   Recommend hospitalization status upgraded from   OBSERVATION  to INPATIENT  Status     Pt Name:  Carlitos Elise   MR#   [facility] # [id number] /  [id number]  Payor: Lashae Pantoja / Plan: Bhumi  / Product Type: Medicare /    PAWEL#  [id number]   [address]  664/01  @ [facility]   Hospitalization date  1/26/2019  6:35 PM   Current Attending Physician  Ru Johnson MD   Principal diagnosis  Syncope and collapse     Clinicals  68 y.o. y.o  female hospitalized with above diagnosis   The pt suffers from multiple chronic illnesses including but not limited to HTN, Hyperlipidemia, CKD, etc.  She sustained a syncope and she was found to have some arrhythmia, non-specific EKG changes and mildly elevated troponin level that since then has normalized. Due to appropriate and necessary medical care, this pt's hospitalization has now exceeded two midnights . [place] ([facility]) criteria   Does  NOT apply    STATUS DETERMINATION  This patient is at above high risk of deterioration based on documented presenting clinical data, comorbid conditions, high risk of adverse events and current acute care course. Ms. Carlitos Elise now meets Inpatient Admission status criteria in accordance with CMS regulation Section 43 .3. Specifically, due to medical necessity the patient's stay now exceeds Two Midnights. It is our recommendation that this patient's hospitalization status should be upgraded from  OBSERVATION to INPATIENT status.      The final decision of the patient's hospitalization status depends on the attending physician's judgment            Additional comments     Payor: Lashae Pantoja / Plan: Bhumi  / Product Type: Medicare /         Florence Ruff MD MPH FAC   Cell: [phone]  Physician [address] [facility]   [employer] Medical Staff, [address] [place] [address]  [phone]        [phone]    .

## 2019-01-28 NOTE — PROGRESS NOTES
Bedside and Verbal shift change report given to Don Luevano RN (oncoming nurse) by Sawyer Ernandez (offgoing nurse). Report included the following information SBAR, Kardex, Intake/Output, MAR, Recent Results and Cardiac Rhythm sinus arrhythmia.

## 2019-01-28 NOTE — DISCHARGE SUMMARY
[address]    DISCHARGE SUMMARY    Marco Conn  MR#: [id number]  : 1945  ACCOUNT #: [de-identified]   ADMIT DATE: 2019  DISCHARGE DATE: 2019    MAIN DIAGNOSIS ON ADMISSION:  Acute syncope. MAIN DIAGNOSIS ON DISCHARGE:  Acute syncope, probably due to vasovagal event. CONSULTANTS:  Cardiology. HOSPITAL COURSE:   71-year-old female with a past medical history significant for coronary artery disease, chronic systolic congestive heart failure, primary hypertension, dyslipidemia, prediabetes, chronic kidney disease, was in her usual state of health until the day of the Emergency Room presentation when patient passed out at [employer]. The patient stated that she did not recollect having any prior symptoms like headaches, dizziness or visual deficits. An episode lasted about 30 seconds. There was no witnessed tongue biting, fecal or urinary incontinence. While in the Emergency Room, patient had an EKG done that showed some bigeminy, and patient was referred to the Hospitalist Service for further evaluation. Patient was admitted to the monitored floor after being evaluated with a 12-lead EKG that showed sinus rhythm with some left ventricular hypertrophy. A chest x-ray without any acute abnormalities. A CBC with a WBC of 9.1, hemoglobin 12.3, hematocrit 38.2 with a platelet count of 610. Troponin of less than 0.05. Metabolic panel with a sodium of 138, potassium 3.1, chloride of 101, a bicarbonate of 28, glucose of 100, a BUN of 13, a creatinine of 1.26 with a calcium of 8.9. Normal liver function tests. Magnesium level of 2.5. Initial management included further evaluation with a CT of the head which did not show any acute intracranial findings, a MRI of the brain which showed no acute infarcts, hemorrhages tumors or midline shift.   A 2D echocardiogram from 01/15/2019 was reviewed that showed decreased systolic function with an ejection fraction of 35-40%. Carotid Doppler studies that showed severe right internal carotid artery stenosis greater than 70%, near occlusion of the left internal carotid artery stenosis greater than 90%, bilateral external carotid artery stenosis greater than 50%. The patient was treated with IV hydration, close monitoring for any dysrhythmia, for which there was none. Further evaluation including with an A1c showed a level of 6.4. The patient was seen and evaluated by the Cardiology Service with recommendations for a decrease in the losartan to 25 mg p.o. daily and metoprolol 12.5 mg to be taken nightly. The patient was continued on her aspirin, statin, daily fish oil, Lasix. Had no evidence of acute systolic congestive heart failure exacerbation. The patient was able to tolerate an advancing heart-healthy diet, able to ambulate unaided, had no recurrence of presyncope or syncope, no orthostasis, and after being cleared by the Cardiology Service was deemed stable for discharge to home with outpatient followup. OBJECTIVE:  GENERAL:  Examination findings on this day of discharge 01/28/2019, patient awake, alert, denied new complaints. VITAL SIGNS:  Blood pressure of 135/72, a heart rate of 75, respiratory rate 16, afebrile, saturating 97% on room air. HEAD:  Normocephalic. Pupils equal and reactive to light. ENT:  Ear, nose, throat clear. NECK:  No jugular venous distention, no carotid bruits. CARDIOVASCULAR:  S1, S2 present. RESPIRATORY:  Lungs with decreased air entry at both bases without any crepitations or rhonchi. GASTROINTESTINAL:  Bowel sounds present. The abdomen soft, nontender, no rebound, no guarding. GENITOURINARY:  Nil of note. MUSCULOSKELETAL:  No asymmetry of the extremities. CENTRAL NERVOUS SYSTEM:  Patient awake, alert, oriented to time, date, place and person.     LABORATORY FINDINGS:  On discharge are as follows:  Metabolic panel with a sodium of 140, potassium 3.7, chloride 106, bicarbonate 27, BUN 32, creatinine 1.12 with a glucose of 102. CBC with a WBC of 9.1, hemoglobin 11.6, hematocrit 35.3 with a platelet count of 229. MCV of 96.2. FINAL DIAGNOSES ON DISCHARGE:  1.  Syncope, probably due to vasovagal event. 2.  Bilateral carotid artery stenoses greater than 50%. 3.  Chronic systolic congestive heart failure without any acute exacerbation. 4.  Primary hypertension. 5.  Dyslipidemia. 6.  Chronic kidney disease, stage III. 7.  Resolved hypokalemia. 8.  Prediabetes. DISCHARGE MEDICATIONS:    1. Please note patient's losartan changed to 25 mg 1 tablet p.o. daily. 2.  Metoprolol changed to 12.5 mg p.o. nightly. 3.  Aspirin 81 mg p.o. daily. 4.  Atorvastatin 40 mg p.o. daily. 5.  Fish oil 1 tablet p.o. daily in the morning. 6.  Lasix 40 mg p.o. daily. 7.  Multivitamin 1 tablet p.o. daily. 8.  Potassium chloride 20 mEq 1 tablet p.o. daily. DISCHARGE INSTRUCTIONS:  The patient is to follow a heart healthy diet, to ambulate as tolerated, to keep adequately hydrated, to follow up with PCP in 2 days' time for recheck of vitals and orthostatic readings. Follow up with her cardiologist, Dr. Clint Ramirez in April as scheduled. Recommendations for patient to be referred to a vascular surgeon for further evaluation of the bilateral internal carotid artery stenoses greater than 50% for further discussions of possible interventions or alternatives for treatment of the bilateral stenoses. DISPOSITION:  The patient is stable for discharge to home this day, as cleared by Cardiology on this day 01/28/2019. The entire discharge plans, including all medications, their side effects, the importance of adhering to all outpatient followup plans discussed in detail with the patient. All questions and concerns were addressed. Total time for the discharge summary 45 minutes.       Liana Crigler, MD       STM/HN  D: 01/28/2019 10:33     T: 01/28/2019 11:23  JOB #: [id number]  CC: Ezra Oconnor MD  CC: Beryl Weinberg MD

## 2019-01-28 NOTE — PROGRESS NOTES
Pharmacist Discharge Medication Reconciliation    Discharging Provider: Dr. Alejandro Santos PMH:   Past Medical History:   Diagnosis Date    CAD (coronary artery disease) 02/26/2018    NSTEMI    Congestive heart failure ([facility]) 02/26/2018    Diabetes ([facility]) 02/26/2018    pre-DM    Hypertension      Chief Complaint for this Admission:   Chief Complaint   Patient presents with    Syncope     Allergies: Adhesive tape-silicones    Discharge Medications:   Current Discharge Medication List        CONTINUE these medications which have CHANGED    Details   losartan (COZAAR) 25 mg tablet Take 1 Tab by mouth daily. Qty: 30 Tab, Refills: 1      metoprolol succinate (TOPROL-XL) 25 mg XL tablet Take 0.5 Tabs by mouth nightly. Qty: 30 Tab, Refills: 1           CONTINUE these medications which have NOT CHANGED    Details   atorvastatin (LIPITOR) 40 mg tablet Take 1 Tab by mouth daily. Qty: 90 Tab, Refills: 3      furosemide (LASIX) 40 mg tablet Take 1 Tab by mouth daily. Qty: 90 Tab, Refills: 3      aspirin delayed-release 81 mg tablet Take 1 Tab by mouth daily. Qty: 90 Tab, Refills: 3      potassium chloride SR (K-TAB) 20 mEq tablet Take 1 Tab by mouth daily. Indications: hypokalemia prevention  Qty: 90 Tab, Refills: 3      DOCOSAHEXANOIC ACID/EPA (FISH OIL PO) Take  by mouth daily. multivitamin (ONE A DAY) tablet Take 1 Tab by mouth daily. The patient's chart, MAR and AVS were reviewed by Malathi Gillespie.

## 2019-01-28 NOTE — PROGRESS NOTES
I have reviewed discharge instructions with the patient. The patient verbalized understanding. PIV and telemetry discontinued. Pt discharged to home in the care of a family member.

## 2019-01-28 NOTE — PROGRESS NOTES
Problem: Falls - Risk of  Goal: *Absence of Falls  Document Tim Fall Risk and appropriate interventions in the flowsheet. Outcome: Progressing Towards Goal  Fall Risk Interventions:            Medication Interventions: Patient to call before getting OOB, Teach patient to arise slowly         History of Falls Interventions: Consult care management for discharge planning, Room close to nurse's station        Problem: Pressure Injury - Risk of  Goal: *Prevention of pressure injury  Document Jeffry Scale and appropriate interventions in the flowsheet.   Outcome: Progressing Towards Goal  Pressure Injury Interventions:

## 2019-01-28 NOTE — DISCHARGE INSTRUCTIONS
Patient to follow a heart healthy diet. Patient to keep adequately hydrated. Patient to ambulate as tolerated. Patient to start taking Toprol 12.5mg at nights. Patient to start taking Losartan 25mg daily. Patient to follow up with PCP in 2 days. Patient to follow up with Cardiologist Dr Wong Hackett in April as scheduled. Recommendations for patient to be referred by PCP to a Vascular surgeon for further evaluation of the bilateral Internal carotid Artery stenoses greater than fifty percent.

## 2019-01-28 NOTE — PROGRESS NOTES
Hospital follow-up PCP transitional care appointment has been scheduled with Dr. Nelson Edwards for Thursday, 1/31/19 at 2:00 p.m. Pending patient discharge.   Amaya Birmingham, Care Management Specialist.

## 2019-01-30 ENCOUNTER — HOSPITAL ENCOUNTER (OUTPATIENT)
Dept: CT IMAGING | Age: 74
Discharge: HOME | DRG: 039 | End: 2019-01-30
Attending: SURGERY
Payer: MEDICARE

## 2019-01-30 DIAGNOSIS — I65.29 CAROTID ARTERY STENOSIS: ICD-10-CM

## 2019-01-30 PROCEDURE — 74011000258 HC RX REV CODE- 258: Performed by: RADIOLOGY

## 2019-01-30 PROCEDURE — 70496 CT ANGIOGRAPHY HEAD: CPT

## 2019-01-30 PROCEDURE — 70498 CT ANGIOGRAPHY NECK: CPT

## 2019-01-30 PROCEDURE — 74011636320 HC RX REV CODE- 636/320: Performed by: RADIOLOGY

## 2019-01-30 RX ORDER — SODIUM CHLORIDE 0.9 % (FLUSH) 0.9 %
10 SYRINGE (ML) INJECTION
Status: COMPLETED | OUTPATIENT
Start: 2019-01-30 | End: 2019-01-30

## 2019-01-30 RX ADMIN — SODIUM CHLORIDE 100 ML: 900 INJECTION, SOLUTION INTRAVENOUS at 16:58

## 2019-01-30 RX ADMIN — Medication 10 ML: at 16:58

## 2019-01-30 RX ADMIN — IOPAMIDOL 100 ML: 755 INJECTION, SOLUTION INTRAVENOUS at 16:58

## 2019-01-31 ENCOUNTER — ANESTHESIA EVENT (OUTPATIENT)
Dept: SURGERY | Age: 74
DRG: 039 | End: 2019-01-31
Payer: MEDICARE

## 2019-01-31 ENCOUNTER — HOSPITAL ENCOUNTER (OUTPATIENT)
Dept: PREADMISSION TESTING | Age: 74
Discharge: HOME | DRG: 039 | End: 2019-01-31
Payer: MEDICARE

## 2019-01-31 VITALS
DIASTOLIC BLOOD PRESSURE: 80 MMHG | TEMPERATURE: 98.2 F | WEIGHT: 143.8 LBS | HEART RATE: 64 BPM | BODY MASS INDEX: 24.55 KG/M2 | SYSTOLIC BLOOD PRESSURE: 143 MMHG | HEIGHT: 64 IN | RESPIRATION RATE: 16 BRPM

## 2019-01-31 LAB
ABO + RH BLD: NORMAL
APPEARANCE UR: CLEAR
APTT PPP: 26.9 SEC (ref 22.1–32)
ATRIAL RATE: 60 BPM
BACTERIA URNS QL MICRO: NEGATIVE /HPF
BILIRUB UR QL: NEGATIVE
BLOOD GROUP ANTIBODIES SERPL: NORMAL
CALCULATED P AXIS, ECG09: 52 DEGREES
CALCULATED R AXIS, ECG10: 2 DEGREES
CALCULATED T AXIS, ECG11: -21 DEGREES
COLOR UR: ABNORMAL
DIAGNOSIS, 93000: NORMAL
EPITH CASTS URNS QL MICRO: ABNORMAL /LPF
GLUCOSE UR STRIP.AUTO-MCNC: NEGATIVE MG/DL
HGB UR QL STRIP: NEGATIVE
HYALINE CASTS URNS QL MICRO: ABNORMAL /LPF (ref 0–5)
INR PPP: 1 (ref 0.9–1.1)
KETONES UR QL STRIP.AUTO: NEGATIVE MG/DL
LEUKOCYTE ESTERASE UR QL STRIP.AUTO: ABNORMAL
NITRITE UR QL STRIP.AUTO: NEGATIVE
P-R INTERVAL, ECG05: 158 MS
PH UR STRIP: 5.5 [PH] (ref 5–8)
PROT UR STRIP-MCNC: NEGATIVE MG/DL
PROTHROMBIN TIME: 10.4 SEC (ref 9–11.1)
Q-T INTERVAL, ECG07: 388 MS
QRS DURATION, ECG06: 104 MS
QTC CALCULATION (BEZET), ECG08: 388 MS
RBC #/AREA URNS HPF: ABNORMAL /HPF (ref 0–5)
SP GR UR REFRACTOMETRY: 1.01 (ref 1–1.03)
SPECIMEN EXP DATE BLD: NORMAL
THERAPEUTIC RANGE,PTTT: NORMAL SECS (ref 58–77)
UA: UC IF INDICATED,UAUC: ABNORMAL
UROBILINOGEN UR QL STRIP.AUTO: 0.2 EU/DL (ref 0.2–1)
VENTRICULAR RATE, ECG03: 60 BPM
WBC URNS QL MICRO: ABNORMAL /HPF (ref 0–4)

## 2019-01-31 PROCEDURE — 81001 URINALYSIS AUTO W/SCOPE: CPT

## 2019-01-31 PROCEDURE — 93005 ELECTROCARDIOGRAM TRACING: CPT

## 2019-01-31 PROCEDURE — 36415 COLL VENOUS BLD VENIPUNCTURE: CPT

## 2019-01-31 PROCEDURE — 86900 BLOOD TYPING SEROLOGIC ABO: CPT

## 2019-01-31 PROCEDURE — 85730 THROMBOPLASTIN TIME PARTIAL: CPT

## 2019-01-31 PROCEDURE — 85610 PROTHROMBIN TIME: CPT

## 2019-02-01 ENCOUNTER — HOSPITAL ENCOUNTER (INPATIENT)
Age: 74
LOS: 1 days | Discharge: HOME | DRG: 039 | End: 2019-02-02
Attending: SURGERY | Admitting: SURGERY
Payer: MEDICARE

## 2019-02-01 ENCOUNTER — ANESTHESIA (OUTPATIENT)
Dept: SURGERY | Age: 74
DRG: 039 | End: 2019-02-01
Payer: MEDICARE

## 2019-02-01 DIAGNOSIS — I65.21 INTERNAL CAROTID ARTERY STENOSIS, RIGHT: Primary | ICD-10-CM

## 2019-02-01 PROCEDURE — 77030002987 HC SUT PROL J&J -B: Performed by: SURGERY

## 2019-02-01 PROCEDURE — 03UK0JZ SUPPLEMENT RIGHT INTERNAL CAROTID ARTERY WITH SYNTHETIC SUBSTITUTE, OPEN APPROACH: ICD-10-PCS | Performed by: SURGERY

## 2019-02-01 PROCEDURE — 65610000003 HC RM ICU SURGICAL

## 2019-02-01 PROCEDURE — 77030013567 HC DRN WND RESERV BARD -A: Performed by: SURGERY

## 2019-02-01 PROCEDURE — 77030002933 HC SUT MCRYL J&J -A: Performed by: SURGERY

## 2019-02-01 PROCEDURE — 74011250636 HC RX REV CODE- 250/636

## 2019-02-01 PROCEDURE — 74011250636 HC RX REV CODE- 250/636: Performed by: ANESTHESIOLOGY

## 2019-02-01 PROCEDURE — C1768 GRAFT, VASCULAR: HCPCS | Performed by: SURGERY

## 2019-02-01 PROCEDURE — 74011000250 HC RX REV CODE- 250

## 2019-02-01 PROCEDURE — 77030020782 HC GWN BAIR PAWS FLX 3M -B

## 2019-02-01 PROCEDURE — 77030031139 HC SUT VCRL2 J&J -A: Performed by: SURGERY

## 2019-02-01 PROCEDURE — 74011250636 HC RX REV CODE- 250/636: Performed by: SURGERY

## 2019-02-01 PROCEDURE — 77030018824 HC SHNT CAR ARGY COVD -B: Performed by: SURGERY

## 2019-02-01 PROCEDURE — 77030018846 HC SOL IRR STRL H20 ICUM -A: Performed by: SURGERY

## 2019-02-01 PROCEDURE — 77030012406 HC DRN WND PENRS BARD -A: Performed by: SURGERY

## 2019-02-01 PROCEDURE — 77030005538 HC CATH URETH FOL44 BARD -B

## 2019-02-01 PROCEDURE — 77030014008 HC SPNG HEMSTAT J&J -C: Performed by: SURGERY

## 2019-02-01 PROCEDURE — 88304 TISSUE EXAM BY PATHOLOGIST: CPT

## 2019-02-01 PROCEDURE — 03HB33Z INSERTION OF INFUSION DEVICE INTO RIGHT RADIAL ARTERY, PERCUTANEOUS APPROACH: ICD-10-PCS | Performed by: ANESTHESIOLOGY

## 2019-02-01 PROCEDURE — 76210000002 HC OR PH I REC 3 TO 3.5 HR: Performed by: SURGERY

## 2019-02-01 PROCEDURE — 74011000250 HC RX REV CODE- 250: Performed by: ANESTHESIOLOGY

## 2019-02-01 PROCEDURE — 77030002986 HC SUT PROL J&J -A: Performed by: SURGERY

## 2019-02-01 PROCEDURE — 77030013797 HC KT TRNSDUC PRSSR EDWD -A

## 2019-02-01 PROCEDURE — 51798 US URINE CAPACITY MEASURE: CPT

## 2019-02-01 PROCEDURE — 77030002916 HC SUT ETHLN J&J -A: Performed by: SURGERY

## 2019-02-01 PROCEDURE — 76060000037 HC ANESTHESIA 3 TO 3.5 HR: Performed by: SURGERY

## 2019-02-01 PROCEDURE — 77030005402 HC CATH RAD ART LN KT TELE -B

## 2019-02-01 PROCEDURE — 77030032490 HC SLV COMPR SCD KNE COVD -B: Performed by: SURGERY

## 2019-02-01 PROCEDURE — 77030018390 HC SPNG HEMSTAT2 J&J -B: Performed by: SURGERY

## 2019-02-01 PROCEDURE — 88311 DECALCIFY TISSUE: CPT

## 2019-02-01 PROCEDURE — 77030039266 HC ADH SKN EXOFIN S2SG -A: Performed by: SURGERY

## 2019-02-01 PROCEDURE — 36620 INSERTION CATHETER ARTERY: CPT

## 2019-02-01 PROCEDURE — 77030020256 HC SOL INJ NACL 0.9%  500ML: Performed by: SURGERY

## 2019-02-01 PROCEDURE — 77030011640 HC PAD GRND REM COVD -A: Performed by: SURGERY

## 2019-02-01 PROCEDURE — 03CK0ZZ EXTIRPATION OF MATTER FROM RIGHT INTERNAL CAROTID ARTERY, OPEN APPROACH: ICD-10-PCS | Performed by: SURGERY

## 2019-02-01 PROCEDURE — 77030005537 HC CATH URETH BARD -A: Performed by: SURGERY

## 2019-02-01 PROCEDURE — 76010000172 HC OR TIME 2.5 TO 3 HR INTENSV-TIER 1: Performed by: SURGERY

## 2019-02-01 DEVICE — GRAFT VASC W0.3XL3IN THK0.36IN CLLGN ULT THN KNIT PTCH: Type: IMPLANTABLE DEVICE | Site: CAROTID | Status: FUNCTIONAL

## 2019-02-01 RX ORDER — NEOSTIGMINE METHYLSULFATE 1 MG/ML
INJECTION INTRAVENOUS AS NEEDED
Status: DISCONTINUED | OUTPATIENT
Start: 2019-02-01 | End: 2019-02-01 | Stop reason: HOSPADM

## 2019-02-01 RX ORDER — FENTANYL CITRATE 50 UG/ML
50 INJECTION, SOLUTION INTRAMUSCULAR; INTRAVENOUS AS NEEDED
Status: DISCONTINUED | OUTPATIENT
Start: 2019-02-01 | End: 2019-02-01 | Stop reason: HOSPADM

## 2019-02-01 RX ORDER — DEXMEDETOMIDINE HYDROCHLORIDE 4 UG/ML
INJECTION, SOLUTION INTRAVENOUS AS NEEDED
Status: DISCONTINUED | OUTPATIENT
Start: 2019-02-01 | End: 2019-02-01 | Stop reason: HOSPADM

## 2019-02-01 RX ORDER — HEPARIN SODIUM 1000 [USP'U]/ML
INJECTION, SOLUTION INTRAVENOUS; SUBCUTANEOUS AS NEEDED
Status: DISCONTINUED | OUTPATIENT
Start: 2019-02-01 | End: 2019-02-01 | Stop reason: HOSPADM

## 2019-02-01 RX ORDER — FENTANYL CITRATE 50 UG/ML
25 INJECTION, SOLUTION INTRAMUSCULAR; INTRAVENOUS
Status: DISCONTINUED | OUTPATIENT
Start: 2019-02-01 | End: 2019-02-01 | Stop reason: HOSPADM

## 2019-02-01 RX ORDER — PROTAMINE SULFATE 10 MG/ML
INJECTION, SOLUTION INTRAVENOUS AS NEEDED
Status: DISCONTINUED | OUTPATIENT
Start: 2019-02-01 | End: 2019-02-01 | Stop reason: HOSPADM

## 2019-02-01 RX ORDER — CEFAZOLIN SODIUM/WATER 2 G/20 ML
2 SYRINGE (ML) INTRAVENOUS
Status: COMPLETED | OUTPATIENT
Start: 2019-02-01 | End: 2019-02-01

## 2019-02-01 RX ORDER — GLYCOPYRROLATE 0.2 MG/ML
INJECTION INTRAMUSCULAR; INTRAVENOUS AS NEEDED
Status: DISCONTINUED | OUTPATIENT
Start: 2019-02-01 | End: 2019-02-01 | Stop reason: HOSPADM

## 2019-02-01 RX ORDER — EPHEDRINE SULFATE 50 MG/ML
10 INJECTION, SOLUTION INTRAVENOUS ONCE
Status: COMPLETED | OUTPATIENT
Start: 2019-02-01 | End: 2019-02-01

## 2019-02-01 RX ORDER — ACETAMINOPHEN 10 MG/ML
INJECTION, SOLUTION INTRAVENOUS AS NEEDED
Status: DISCONTINUED | OUTPATIENT
Start: 2019-02-01 | End: 2019-02-01 | Stop reason: HOSPADM

## 2019-02-01 RX ORDER — PHENYLEPHRINE HCL IN 0.9% NACL 0.4MG/10ML
SYRINGE (ML) INTRAVENOUS AS NEEDED
Status: DISCONTINUED | OUTPATIENT
Start: 2019-02-01 | End: 2019-02-01 | Stop reason: HOSPADM

## 2019-02-01 RX ORDER — SODIUM CHLORIDE 9 MG/ML
25 INJECTION, SOLUTION INTRAVENOUS CONTINUOUS
Status: DISCONTINUED | OUTPATIENT
Start: 2019-02-01 | End: 2019-02-01 | Stop reason: HOSPADM

## 2019-02-01 RX ORDER — ONDANSETRON 2 MG/ML
4 INJECTION INTRAMUSCULAR; INTRAVENOUS
Status: DISCONTINUED | OUTPATIENT
Start: 2019-02-01 | End: 2019-02-02 | Stop reason: HOSPADM

## 2019-02-01 RX ORDER — ONDANSETRON 2 MG/ML
INJECTION INTRAMUSCULAR; INTRAVENOUS AS NEEDED
Status: DISCONTINUED | OUTPATIENT
Start: 2019-02-01 | End: 2019-02-01 | Stop reason: HOSPADM

## 2019-02-01 RX ORDER — MIDAZOLAM HYDROCHLORIDE 1 MG/ML
1 INJECTION, SOLUTION INTRAMUSCULAR; INTRAVENOUS AS NEEDED
Status: COMPLETED | OUTPATIENT
Start: 2019-02-01 | End: 2019-02-01

## 2019-02-01 RX ORDER — SODIUM CHLORIDE 0.9 % (FLUSH) 0.9 %
5-40 SYRINGE (ML) INJECTION AS NEEDED
Status: DISCONTINUED | OUTPATIENT
Start: 2019-02-01 | End: 2019-02-01 | Stop reason: HOSPADM

## 2019-02-01 RX ORDER — FENTANYL CITRATE 50 UG/ML
INJECTION, SOLUTION INTRAMUSCULAR; INTRAVENOUS AS NEEDED
Status: DISCONTINUED | OUTPATIENT
Start: 2019-02-01 | End: 2019-02-01 | Stop reason: HOSPADM

## 2019-02-01 RX ORDER — ONDANSETRON 2 MG/ML
4 INJECTION INTRAMUSCULAR; INTRAVENOUS AS NEEDED
Status: DISCONTINUED | OUTPATIENT
Start: 2019-02-01 | End: 2019-02-01 | Stop reason: HOSPADM

## 2019-02-01 RX ORDER — SODIUM CHLORIDE 0.9 % (FLUSH) 0.9 %
5-40 SYRINGE (ML) INJECTION EVERY 8 HOURS
Status: DISCONTINUED | OUTPATIENT
Start: 2019-02-01 | End: 2019-02-02 | Stop reason: HOSPADM

## 2019-02-01 RX ORDER — OXYCODONE AND ACETAMINOPHEN 5; 325 MG/1; MG/1
1 TABLET ORAL AS NEEDED
Status: DISCONTINUED | OUTPATIENT
Start: 2019-02-01 | End: 2019-02-01 | Stop reason: HOSPADM

## 2019-02-01 RX ORDER — LIDOCAINE HYDROCHLORIDE 10 MG/ML
INJECTION INFILTRATION; PERINEURAL AS NEEDED
Status: DISCONTINUED | OUTPATIENT
Start: 2019-02-01 | End: 2019-02-01 | Stop reason: HOSPADM

## 2019-02-01 RX ORDER — SODIUM CHLORIDE, SODIUM LACTATE, POTASSIUM CHLORIDE, CALCIUM CHLORIDE 600; 310; 30; 20 MG/100ML; MG/100ML; MG/100ML; MG/100ML
125 INJECTION, SOLUTION INTRAVENOUS CONTINUOUS
Status: DISCONTINUED | OUTPATIENT
Start: 2019-02-01 | End: 2019-02-01 | Stop reason: HOSPADM

## 2019-02-01 RX ORDER — SODIUM CHLORIDE 0.9 % (FLUSH) 0.9 %
5-40 SYRINGE (ML) INJECTION EVERY 8 HOURS
Status: DISCONTINUED | OUTPATIENT
Start: 2019-02-01 | End: 2019-02-01 | Stop reason: HOSPADM

## 2019-02-01 RX ORDER — GLYCOPYRROLATE 0.2 MG/ML
0.2 INJECTION INTRAMUSCULAR; INTRAVENOUS ONCE
Status: COMPLETED | OUTPATIENT
Start: 2019-02-01 | End: 2019-02-01

## 2019-02-01 RX ORDER — SODIUM CHLORIDE 0.9 % (FLUSH) 0.9 %
5-40 SYRINGE (ML) INJECTION AS NEEDED
Status: DISCONTINUED | OUTPATIENT
Start: 2019-02-01 | End: 2019-02-02 | Stop reason: HOSPADM

## 2019-02-01 RX ORDER — ACETAMINOPHEN 650 MG/1
650 SUPPOSITORY RECTAL
Status: DISCONTINUED | OUTPATIENT
Start: 2019-02-01 | End: 2019-02-02 | Stop reason: HOSPADM

## 2019-02-01 RX ORDER — EPHEDRINE SULFATE 50 MG/ML
INJECTION, SOLUTION INTRAVENOUS AS NEEDED
Status: DISCONTINUED | OUTPATIENT
Start: 2019-02-01 | End: 2019-02-01 | Stop reason: HOSPADM

## 2019-02-01 RX ORDER — ATROPINE SULFATE 0.4 MG/ML
0.4 INJECTION, SOLUTION ENDOTRACHEAL; INTRAMEDULLARY; INTRAMUSCULAR; INTRAVENOUS; SUBCUTANEOUS ONCE
Status: COMPLETED | OUTPATIENT
Start: 2019-02-01 | End: 2019-02-01

## 2019-02-01 RX ORDER — OXYCODONE HYDROCHLORIDE 5 MG/1
5 TABLET ORAL
Status: DISCONTINUED | OUTPATIENT
Start: 2019-02-01 | End: 2019-02-02 | Stop reason: HOSPADM

## 2019-02-01 RX ORDER — DEXTROSE, SODIUM CHLORIDE, AND POTASSIUM CHLORIDE 5; .45; .15 G/100ML; G/100ML; G/100ML
75 INJECTION INTRAVENOUS CONTINUOUS
Status: DISCONTINUED | OUTPATIENT
Start: 2019-02-01 | End: 2019-02-02

## 2019-02-01 RX ORDER — EPHEDRINE SULFATE 50 MG/ML
INJECTION, SOLUTION INTRAVENOUS
Status: COMPLETED
Start: 2019-02-01 | End: 2019-02-01

## 2019-02-01 RX ORDER — MIDAZOLAM HYDROCHLORIDE 1 MG/ML
0.5 INJECTION, SOLUTION INTRAMUSCULAR; INTRAVENOUS
Status: DISCONTINUED | OUTPATIENT
Start: 2019-02-01 | End: 2019-02-01 | Stop reason: HOSPADM

## 2019-02-01 RX ORDER — SODIUM CHLORIDE, SODIUM LACTATE, POTASSIUM CHLORIDE, CALCIUM CHLORIDE 600; 310; 30; 20 MG/100ML; MG/100ML; MG/100ML; MG/100ML
INJECTION, SOLUTION INTRAVENOUS
Status: DISCONTINUED | OUTPATIENT
Start: 2019-02-01 | End: 2019-02-01 | Stop reason: HOSPADM

## 2019-02-01 RX ORDER — GLYCOPYRROLATE 0.2 MG/ML
INJECTION INTRAMUSCULAR; INTRAVENOUS
Status: DISPENSED
Start: 2019-02-01 | End: 2019-02-02

## 2019-02-01 RX ORDER — MIDAZOLAM HYDROCHLORIDE 1 MG/ML
INJECTION, SOLUTION INTRAMUSCULAR; INTRAVENOUS AS NEEDED
Status: DISCONTINUED | OUTPATIENT
Start: 2019-02-01 | End: 2019-02-01 | Stop reason: HOSPADM

## 2019-02-01 RX ORDER — DEXAMETHASONE SODIUM PHOSPHATE 4 MG/ML
INJECTION, SOLUTION INTRA-ARTICULAR; INTRALESIONAL; INTRAMUSCULAR; INTRAVENOUS; SOFT TISSUE AS NEEDED
Status: DISCONTINUED | OUTPATIENT
Start: 2019-02-01 | End: 2019-02-01 | Stop reason: HOSPADM

## 2019-02-01 RX ORDER — NALOXONE HYDROCHLORIDE 0.4 MG/ML
0.4 INJECTION, SOLUTION INTRAMUSCULAR; INTRAVENOUS; SUBCUTANEOUS AS NEEDED
Status: DISCONTINUED | OUTPATIENT
Start: 2019-02-01 | End: 2019-02-02 | Stop reason: HOSPADM

## 2019-02-01 RX ORDER — ROCURONIUM BROMIDE 10 MG/ML
INJECTION, SOLUTION INTRAVENOUS AS NEEDED
Status: DISCONTINUED | OUTPATIENT
Start: 2019-02-01 | End: 2019-02-01 | Stop reason: HOSPADM

## 2019-02-01 RX ORDER — LIDOCAINE HYDROCHLORIDE 20 MG/ML
INJECTION, SOLUTION EPIDURAL; INFILTRATION; INTRACAUDAL; PERINEURAL AS NEEDED
Status: DISCONTINUED | OUTPATIENT
Start: 2019-02-01 | End: 2019-02-01 | Stop reason: HOSPADM

## 2019-02-01 RX ORDER — ENOXAPARIN SODIUM 100 MG/ML
40 INJECTION SUBCUTANEOUS EVERY 24 HOURS
Status: DISCONTINUED | OUTPATIENT
Start: 2019-02-02 | End: 2019-02-02 | Stop reason: HOSPADM

## 2019-02-01 RX ORDER — CEFAZOLIN SODIUM/WATER 2 G/20 ML
2 SYRINGE (ML) INTRAVENOUS EVERY 8 HOURS
Status: COMPLETED | OUTPATIENT
Start: 2019-02-01 | End: 2019-02-02

## 2019-02-01 RX ORDER — ATROPINE SULFATE 0.4 MG/ML
INJECTION, SOLUTION ENDOTRACHEAL; INTRAMEDULLARY; INTRAMUSCULAR; INTRAVENOUS; SUBCUTANEOUS
Status: COMPLETED
Start: 2019-02-01 | End: 2019-02-01

## 2019-02-01 RX ORDER — GUAIFENESIN 100 MG/5ML
81 LIQUID (ML) ORAL DAILY
Status: DISCONTINUED | OUTPATIENT
Start: 2019-02-02 | End: 2019-02-02 | Stop reason: HOSPADM

## 2019-02-01 RX ORDER — ACETAMINOPHEN 325 MG/1
650 TABLET ORAL
Status: DISCONTINUED | OUTPATIENT
Start: 2019-02-01 | End: 2019-02-02 | Stop reason: HOSPADM

## 2019-02-01 RX ORDER — MORPHINE SULFATE 10 MG/ML
2 INJECTION, SOLUTION INTRAMUSCULAR; INTRAVENOUS
Status: DISCONTINUED | OUTPATIENT
Start: 2019-02-01 | End: 2019-02-01 | Stop reason: HOSPADM

## 2019-02-01 RX ORDER — HYDROMORPHONE HYDROCHLORIDE 2 MG/ML
0.5 INJECTION, SOLUTION INTRAMUSCULAR; INTRAVENOUS; SUBCUTANEOUS
Status: DISCONTINUED | OUTPATIENT
Start: 2019-02-01 | End: 2019-02-02 | Stop reason: HOSPADM

## 2019-02-01 RX ORDER — LIDOCAINE HYDROCHLORIDE 10 MG/ML
0.1 INJECTION, SOLUTION EPIDURAL; INFILTRATION; INTRACAUDAL; PERINEURAL AS NEEDED
Status: DISCONTINUED | OUTPATIENT
Start: 2019-02-01 | End: 2019-02-01 | Stop reason: HOSPADM

## 2019-02-01 RX ORDER — PROPOFOL 10 MG/ML
INJECTION, EMULSION INTRAVENOUS AS NEEDED
Status: DISCONTINUED | OUTPATIENT
Start: 2019-02-01 | End: 2019-02-01 | Stop reason: HOSPADM

## 2019-02-01 RX ADMIN — SODIUM CHLORIDE, SODIUM LACTATE, POTASSIUM CHLORIDE, AND CALCIUM CHLORIDE 125 ML/HR: 600; 310; 30; 20 INJECTION, SOLUTION INTRAVENOUS at 09:05

## 2019-02-01 RX ADMIN — EPHEDRINE SULFATE 10 MG: 50 INJECTION, SOLUTION INTRAVENOUS at 15:07

## 2019-02-01 RX ADMIN — DEXAMETHASONE SODIUM PHOSPHATE 4 MG: 4 INJECTION, SOLUTION INTRA-ARTICULAR; INTRALESIONAL; INTRAMUSCULAR; INTRAVENOUS; SOFT TISSUE at 12:05

## 2019-02-01 RX ADMIN — ATROPINE SULFATE 0.4 MG: 0.4 INJECTION, SOLUTION INTRAMUSCULAR; INTRAVENOUS; SUBCUTANEOUS at 16:28

## 2019-02-01 RX ADMIN — PROPOFOL 140 MG: 10 INJECTION, EMULSION INTRAVENOUS at 11:14

## 2019-02-01 RX ADMIN — GLYCOPYRROLATE 0.2 MG: 0.2 INJECTION INTRAMUSCULAR; INTRAVENOUS at 11:45

## 2019-02-01 RX ADMIN — Medication 80 MCG: at 11:16

## 2019-02-01 RX ADMIN — GLYCOPYRROLATE 0.4 MG: 0.2 INJECTION INTRAMUSCULAR; INTRAVENOUS at 13:44

## 2019-02-01 RX ADMIN — EPHEDRINE SULFATE 5 MG: 50 INJECTION, SOLUTION INTRAVENOUS at 11:48

## 2019-02-01 RX ADMIN — PROPOFOL 60 MG: 10 INJECTION, EMULSION INTRAVENOUS at 11:35

## 2019-02-01 RX ADMIN — Medication 80 MCG: at 11:14

## 2019-02-01 RX ADMIN — FENTANYL CITRATE 50 MCG: 50 INJECTION, SOLUTION INTRAMUSCULAR; INTRAVENOUS at 11:14

## 2019-02-01 RX ADMIN — MIDAZOLAM 1 MG: 1 INJECTION INTRAMUSCULAR; INTRAVENOUS at 09:14

## 2019-02-01 RX ADMIN — DEXMEDETOMIDINE HYDROCHLORIDE 4 MCG: 4 INJECTION, SOLUTION INTRAVENOUS at 11:14

## 2019-02-01 RX ADMIN — EPHEDRINE SULFATE 5 MG: 50 INJECTION, SOLUTION INTRAVENOUS at 11:43

## 2019-02-01 RX ADMIN — Medication 2 G: at 19:03

## 2019-02-01 RX ADMIN — ATROPINE SULFATE 0.4 MG: 0.4 INJECTION, SOLUTION INTRAMUSCULAR; INTRAVENOUS; SUBCUTANEOUS at 16:29

## 2019-02-01 RX ADMIN — MIDAZOLAM HYDROCHLORIDE 2 MG: 1 INJECTION, SOLUTION INTRAMUSCULAR; INTRAVENOUS at 11:00

## 2019-02-01 RX ADMIN — PROTAMINE SULFATE 20 MG: 10 INJECTION, SOLUTION INTRAVENOUS at 12:56

## 2019-02-01 RX ADMIN — SODIUM CHLORIDE, SODIUM LACTATE, POTASSIUM CHLORIDE, CALCIUM CHLORIDE: 600; 310; 30; 20 INJECTION, SOLUTION INTRAVENOUS at 11:30

## 2019-02-01 RX ADMIN — ONDANSETRON 4 MG: 2 INJECTION INTRAMUSCULAR; INTRAVENOUS at 13:08

## 2019-02-01 RX ADMIN — PROPOFOL 100 MG: 10 INJECTION, EMULSION INTRAVENOUS at 13:26

## 2019-02-01 RX ADMIN — GLYCOPYRROLATE 0.2 MG: 0.2 INJECTION, SOLUTION INTRAMUSCULAR; INTRAVENOUS at 15:18

## 2019-02-01 RX ADMIN — ACETAMINOPHEN 1000 MG: 10 INJECTION, SOLUTION INTRAVENOUS at 11:36

## 2019-02-01 RX ADMIN — LIDOCAINE HYDROCHLORIDE 60 MG: 20 INJECTION, SOLUTION EPIDURAL; INFILTRATION; INTRACAUDAL; PERINEURAL at 11:14

## 2019-02-01 RX ADMIN — Medication 2 G: at 11:33

## 2019-02-01 RX ADMIN — NEOSTIGMINE METHYLSULFATE 2 MG: 1 INJECTION INTRAVENOUS at 13:44

## 2019-02-01 RX ADMIN — PHENYLEPHRINE HYDROCHLORIDE 20 MCG/MIN: 10 INJECTION INTRAVENOUS at 16:58

## 2019-02-01 RX ADMIN — DEXTROSE MONOHYDRATE, SODIUM CHLORIDE, AND POTASSIUM CHLORIDE 75 ML/HR: 50; 4.5; 1.49 INJECTION, SOLUTION INTRAVENOUS at 16:45

## 2019-02-01 RX ADMIN — DEXMEDETOMIDINE HYDROCHLORIDE 4 MCG: 4 INJECTION, SOLUTION INTRAVENOUS at 11:01

## 2019-02-01 RX ADMIN — EPHEDRINE SULFATE 10 MG: 50 INJECTION, SOLUTION INTRAVENOUS at 15:34

## 2019-02-01 RX ADMIN — DEXMEDETOMIDINE HYDROCHLORIDE 4 MCG: 4 INJECTION, SOLUTION INTRAVENOUS at 11:00

## 2019-02-01 RX ADMIN — SODIUM CHLORIDE, SODIUM LACTATE, POTASSIUM CHLORIDE, CALCIUM CHLORIDE: 600; 310; 30; 20 INJECTION, SOLUTION INTRAVENOUS at 10:59

## 2019-02-01 RX ADMIN — Medication 80 MCG: at 11:15

## 2019-02-01 RX ADMIN — FENTANYL CITRATE 50 MCG: 50 INJECTION, SOLUTION INTRAMUSCULAR; INTRAVENOUS at 11:34

## 2019-02-01 RX ADMIN — MIDAZOLAM 1 MG: 1 INJECTION INTRAMUSCULAR; INTRAVENOUS at 09:46

## 2019-02-01 RX ADMIN — ATROPINE SULFATE 0.4 MG: 0.4 INJECTION, SOLUTION ENDOTRACHEAL; INTRAMEDULLARY; INTRAMUSCULAR; INTRAVENOUS; SUBCUTANEOUS at 16:28

## 2019-02-01 RX ADMIN — PHENYLEPHRINE HYDROCHLORIDE 50 MCG/MIN: 10 INJECTION INTRAVENOUS at 17:22

## 2019-02-01 RX ADMIN — PROPOFOL 50 MG: 10 INJECTION, EMULSION INTRAVENOUS at 13:34

## 2019-02-01 RX ADMIN — ROCURONIUM BROMIDE 40 MG: 10 INJECTION, SOLUTION INTRAVENOUS at 11:14

## 2019-02-01 RX ADMIN — DEXMEDETOMIDINE HYDROCHLORIDE 4 MCG: 4 INJECTION, SOLUTION INTRAVENOUS at 11:11

## 2019-02-01 RX ADMIN — HEPARIN SODIUM 7500 UNITS: 1000 INJECTION, SOLUTION INTRAVENOUS; SUBCUTANEOUS at 11:45

## 2019-02-01 RX ADMIN — DEXMEDETOMIDINE HYDROCHLORIDE 4 MCG: 4 INJECTION, SOLUTION INTRAVENOUS at 11:06

## 2019-02-01 NOTE — ANESTHESIA PREPROCEDURE EVALUATION
Anesthetic History No history of anesthetic complications Review of Systems / Medical History Patient summary reviewed, nursing notes reviewed and pertinent labs reviewed Pulmonary Within defined limits Neuro/Psych Within defined limits Cardiovascular Within defined limits Hypertension: well controlled Dysrhythmias : PVC Past MI and CAD Exercise tolerance: >4 METS Comments: LBBB Carotid stenosis GI/Hepatic/Renal 
Within defined limits Endo/Other Diabetes: type 2 Other Findings Physical Exam 
 
Airway Mallampati: II 
TM Distance: > 6 cm Neck ROM: normal range of motion Mouth opening: Normal 
 
 Cardiovascular Murmur: Grade 2 Dental 
No notable dental hx Pulmonary Breath sounds clear to auscultation Abdominal 
GI exam deferred Other Findings Anesthetic Plan ASA: 3 Anesthesia type: general 
 
Monitoring Plan: Arterial line Induction: Intravenous Anesthetic plan and risks discussed with: Patient

## 2019-02-01 NOTE — PERIOP NOTES
Dr Maryam Lewis to bedside to see pt. Orders rec'd for continued low heart rate.   ANITA gtt decreased to 15 mcg/min since blood pressure increasing

## 2019-02-01 NOTE — OP NOTES
[facility]  OPERATIVE REPORT    Tiffany Neves  MR#: [id number]  : 1945  ACCOUNT #: [de-identified]   DATE OF SERVICE: 2019    PREOPERATIVE DIAGNOSIS:  Critical right carotid stenosis. POSTOPERATIVE DIAGNOSIS:  Critical right carotid stenosis. PROCEDURES:  Right carotid endarterectomy with Dacron patch. SURGEON:  Gildardo Mcnair MD    ASSISTANT:  Baylee Koch. ANESTHESIA:  General endotracheal anesthesia. ANESTHESIOLOGIST:  Mckenna Vizcaino. IMPLANTS:  Hemashield Dacron patch. SPECIMENS:  Right carotid bifurcation plaque. ESTIMATED BLOOD LOSS:  200 mL. COMPLICATIONS:  None. INDICATIONS:  This is a pleasant 67-year-old woman who was admitted last week with syncope. Just prior to discharge, she received a carotid duplex which showed left carotid occlusion and right severe carotid stenosis. We got an outpatient carotid CTA which confirmed critical right carotid stenosis. It was opted to proceed with endarterectomy. She was brought into the hospital as quickly as possible on the next day following her procedure and is scheduled for endarterectomy. DESCRIPTION OF PROCEDURE:  After general endotracheal anesthesia. She was prepped and draped. Timeout was done. She is aware of all the potential risks and complications. The incision was made parallel and medial to the sternocleidomastoid, through skin and platysma, crossing facial veins, were ligated between silk ties. Common internal and external carotid arteries were gently dissected free. The common and external controlled with umbilical tapes and Rumel trocars. The internal with a double looped small vessel loop. The patient was given 7500 units of heparin.   Three minutes later flow was interrupted and an arteriotomy was made from the common high into the internal.  A #10 Clinton shunt previously flushed with heparin saline and marked with a 2-0 silk tie was placed first into the common allowed to bleed and then to the internal.  Shunt patency was confirmed with the Doppler. Standard endarterectomy of the common, external and internal were performed. The endarterectomy did not properly end distally and had to require 3 tacking simple sutures placed in the posterior plaque which was less than a millimeter in size with 6-0 Prolene. A Dacron patch was then sutured circumferentially with 2 additional throws remaining, the shunt was removed. There was excellent backbleeding. The closure was completed. Flow was restored to the external and then the internal.  Four additional sutures were required, which accounted for most of the bleeding. Doppler signals were normal at all times. Hemodynamics were normal at all time. It should be noted that a manipulation of the external carotid, there was some bradycardia requiring infiltration of the carotid sinus with 1% plain Xylocaine which quickly resolved the issue. At the completion of the procedure, the final Ray-Tecs and instrument and needle counts were announced correct x2. A small Rufus-Marino drain was introduced from an inferior small incision. The deep structures were closed with 4-0 Vicryl and the skin was platysma with running 4-0 Vicryl over a drain. the skin with subcuticular Monocryl. Dermabond was applied. The patient tolerated the procedure well. At the time of this dictation, neurological status was unknown.       MD MITCHELL Hoyt /   D: 02/01/2019 13:30     T: 02/01/2019 14:55  JOB #: [id number]  CC: Lonny Benitez MD  CC: Sahara Felder MD

## 2019-02-01 NOTE — PERIOP NOTES
1800 - Pt on bed pan and unable to void. Bladder scan indicated >584 mL.  1810 - Per order, Gomez catheter placed. Sterile technique followed, pt tolerated procedure well. 600 mL of clear yellow urine obtained.

## 2019-02-01 NOTE — PERIOP NOTES
FIBRILLAR WAS GIVEN TO THE STERILE FIELD TO BE USED BY MD DURING PROCEDURE  REF: [id number]  LOT: [id number]  EXP: 07-

## 2019-02-01 NOTE — PERIOP NOTES
SURGICEL WAS GIVEN TO THE STERILE FIELD TO BE USED BY MD DURING PROCEDURE  REF: [id number]  LOT: [id number]  EXP: 25-

## 2019-02-01 NOTE — BRIEF OP NOTE
BRIEF OPERATIVE NOTE    Date of Procedure: 2/1/2019   Preoperative Diagnosis: CRITICAL RIGHT ICA STENOSIS  Postoperative Diagnosis: CRITICAL RIGHT ICA STENOSIS    Procedure(s):  RIGHT CAROTID ARTERY ENDARTERECTOMY WITH PATCH  Surgeon(s) and Role:     * Luke Carranza MD - Primary         Surgical Assistant: *Lennox Galli**    Surgical Staff:  Circ-1: Loyd Torres  Circ-Relief: Jack Pariis  Scrub RN-1: Madhu Baeza RN  Scrub RN-2: Connie Beard  Surg Asst-1: Remedios Melo  Event Time In Time Out   Incision Start 1132    Incision Close       Anesthesia: General   Estimated Blood Loss: *200mls*  Specimens:   ID Type Source Tests Collected by Time Destination   1 : Right Carotid Plaque Fresh OTHER (use comments)  Luke Carranza MD 2/1/2019 1217 Pathology      Findings: *Very thin adventitia left after endarterectomy with several sutures at the completion of the procedure to obtain hemostasis which accounted for blood loss; tacking sutures placed superiorly as no end of endarterectomy plane seen with posterior are less than 1mm in thickness. **   Complications: *none**  Implants:   Implant Name Type Inv.  Item Serial No.  Lot No. LRB No. Used Action   PATCH VASC PLAT FINESSE [id number] --  - [id number]  PATCH VASC PLAT FINESSE 8X76MM --  [id number] GETINGE AB ZAKI CARDIOVASCLR [id number] Right 1 Implanted

## 2019-02-01 NOTE — ROUTINE PROCESS
Patient: Bev Lincoln MRN: [id number]  SSN: [id number]   YOB: 1945  Age: 68 y.o. Sex: female     Patient is status post Procedure(s):  RIGHT CAROTID ARTERY ENDARTERECTOMY WITH PATCH. Surgeon(s) and Role:     * Dayton Starkey MD - Primary    Local/Dose/Irrigation:  1ml 1% lidocaine. Irrigation 500ml normal saline with 2,000 units of 1:1,000 heparin                  Peripheral IV 02/01/19 Left Wrist (Active)   Site Assessment Clean, dry, & intact 2/1/2019  9:04 AM   Phlebitis Assessment 0 2/1/2019  9:04 AM   Infiltration Assessment 0 2/1/2019  9:04 AM   Dressing Status New 2/1/2019  9:04 AM   Dressing Type Tape;Transparent 2/1/2019  9:04 AM   Hub Color/Line Status Green 2/1/2019  9:04 AM      Arterial Line 02/01/19 Right Radial artery (Active)   Site Assessment Clean, dry, & intact 2/1/2019  9:44 AM   Dressing Status New 2/1/2019  9:44 AM   Dressing Type Tape;Transparent 2/1/2019  9:44 AM   Treatment Arm board on 2/1/2019  9:44 AM        Rufus-Marino Drain 02/01/19 Anterior Neck (Active)   Site Assessment Dry;Clean 2/1/2019 12:57 PM   Dressing Status Clean, dry, & intact 2/1/2019 12:57 PM   Status Patent 2/1/2019 12:57 PM      Airway - Endotracheal Tube 02/01/19 Oral (Active)                   Dressing/Packing:  Wound Neck Right-Dressing Type : 4 x 4;Special tape (comment); Topical skin adhesive/glue(drain dressing and medipore w drain and Exofin incision) (02/01/19 4177)  Splint/Cast:  ]    Other:  SCDs, SALOMÓN Drain right neck

## 2019-02-01 NOTE — ANESTHESIA PROCEDURE NOTES
Arterial Line Placement Start time: 2/1/2019 9:48 AM 
End time: 2/1/2019 9:56 AM 
Performed by: Michelle Mei MD 
Authorized by: Michelle Mei MD  
 
Pre-Procedure Indications:  Arterial pressure monitoring and blood sampling Preanesthetic Checklist: patient identified, risks and benefits discussed, anesthesia consent, site marked, patient being monitored, timeout performed and patient being monitored Timeout Time: 09:45 Procedure:  
Prep:  ChloraPrep and alcohol Seldinger Technique?: Yes Orientation:  Right Location:  Radial artery Catheter size:  20 G Number of attempts:  1 Cont Cardiac Output Sensor: No   
 
Assessment:  
Post-procedure:  Line secured and sterile dressing applied Patient Tolerance:  Patient tolerated the procedure well with no immediate complications

## 2019-02-02 VITALS
HEART RATE: 57 BPM | OXYGEN SATURATION: 96 % | TEMPERATURE: 98 F | WEIGHT: 143.3 LBS | RESPIRATION RATE: 16 BRPM | DIASTOLIC BLOOD PRESSURE: 42 MMHG | SYSTOLIC BLOOD PRESSURE: 108 MMHG | HEIGHT: 64 IN | BODY MASS INDEX: 24.46 KG/M2

## 2019-02-02 LAB
ANION GAP SERPL CALC-SCNC: 9 MMOL/L (ref 5–15)
BUN SERPL-MCNC: 18 MG/DL (ref 6–20)
BUN/CREAT SERPL: 16 (ref 12–20)
CALCIUM SERPL-MCNC: 8.5 MG/DL (ref 8.5–10.1)
CHLORIDE SERPL-SCNC: 107 MMOL/L (ref 97–108)
CO2 SERPL-SCNC: 25 MMOL/L (ref 21–32)
CREAT SERPL-MCNC: 1.13 MG/DL (ref 0.55–1.02)
ERYTHROCYTE [DISTWIDTH] IN BLOOD BY AUTOMATED COUNT: 13.3 % (ref 11.5–14.5)
GLUCOSE SERPL-MCNC: 127 MG/DL (ref 65–100)
HCT VFR BLD AUTO: 28.3 % (ref 35–47)
HGB BLD-MCNC: 9 G/DL (ref 11.5–16)
MCH RBC QN AUTO: 31.4 PG (ref 26–34)
MCHC RBC AUTO-ENTMCNC: 31.8 G/DL (ref 30–36.5)
MCV RBC AUTO: 98.6 FL (ref 80–99)
NRBC # BLD: 0 K/UL (ref 0–0.01)
NRBC BLD-RTO: 0 PER 100 WBC
PLATELET # BLD AUTO: 275 K/UL (ref 150–400)
PMV BLD AUTO: 10.4 FL (ref 8.9–12.9)
POTASSIUM SERPL-SCNC: 4 MMOL/L (ref 3.5–5.1)
RBC # BLD AUTO: 2.87 M/UL (ref 3.8–5.2)
SODIUM SERPL-SCNC: 141 MMOL/L (ref 136–145)
WBC # BLD AUTO: 9.7 K/UL (ref 3.6–11)

## 2019-02-02 PROCEDURE — 74011250636 HC RX REV CODE- 250/636: Performed by: SURGERY

## 2019-02-02 PROCEDURE — 74011250637 HC RX REV CODE- 250/637: Performed by: SURGERY

## 2019-02-02 PROCEDURE — 85027 COMPLETE CBC AUTOMATED: CPT

## 2019-02-02 PROCEDURE — 36415 COLL VENOUS BLD VENIPUNCTURE: CPT

## 2019-02-02 PROCEDURE — 80048 BASIC METABOLIC PNL TOTAL CA: CPT

## 2019-02-02 RX ORDER — OXYCODONE HYDROCHLORIDE 5 MG/1
5 TABLET ORAL
Qty: 20 TAB | Refills: 0 | Status: SHIPPED | OUTPATIENT
Start: 2019-02-02 | End: 2019-12-19

## 2019-02-02 RX ADMIN — ASPIRIN 81 MG CHEWABLE TABLET 81 MG: 81 TABLET CHEWABLE at 08:28

## 2019-02-02 RX ADMIN — DEXTROSE MONOHYDRATE, SODIUM CHLORIDE, AND POTASSIUM CHLORIDE 75 ML/HR: 50; 4.5; 1.49 INJECTION, SOLUTION INTRAVENOUS at 06:13

## 2019-02-02 RX ADMIN — Medication 10 ML: at 02:45

## 2019-02-02 RX ADMIN — ENOXAPARIN SODIUM 40 MG: 40 INJECTION SUBCUTANEOUS at 09:35

## 2019-02-02 RX ADMIN — Medication 2 G: at 02:44

## 2019-02-02 NOTE — PROGRESS NOTES
0800[facility]. Pt sitting up in chair, waiting for bfast. Pt AOX4, calm and cooperative. Gomez d/c'd at [phone], plan to void by 1250. Lester off since 1733.    1028: Dr. Ney Browne at bedside, order received to d/c SALOMÓN drain, arterial line, and discharge pt today. Oxycodone Rx PRN for pain discussed w/ pt, pt understands. Pt okay to shower when she gets home today. 1102: Pt voided on toilet, 300mL - clear, yellow, & odorless. 1110: SALOMÓN drain d/c'd, scant serosang drainage. 4x4 and medipore tape placed. 1115: Arterial line d/c'd    1138: Pt called caregiver in prep for discharge. 1239: NIHSS completed    1245: Delvin Benson RN performed discharge NIHSS. Score was a 0. Stroke Education provided to patient and relative(s) and the following topics were discussed    1. Patients personal risk factors for stroke are carotid stenosis and family history    2. Warning signs of Stroke:        * Sudden numbness or weakness of the face, arm or leg, especially on one side of          The body            * Sudden confusion, trouble speaking or understanding        * Sudden trouble seeing in one or both eyes        * Sudden trouble walking, dizziness, loss of balance or coordination        * Sudden severe headache with no known cause    3. Importance of activation Emergency Medical Services ( 9-1-1 ) immediately if experience any warning signs of stroke. 4. Be sure and schedule a follow-up appointment with your primary care doctor or any specialists as instructed. 5. You must take medicine every day to treat your risk factors for stroke. Be sure to take your medicines exactly as your doctor tells you: no more, no less. Know what your medicines are for , what they do. Anti-thrombotics /anticoagulants can help prevent strokes. You are taking the following medicine(s)  ASA     6. Smoking and second-hand smoke greatly increase your risk of stroke, cardiovascular disease and death. Smoking history never    7. Information provided was [facility] Stroke Education Binder    8. Documentation of teaching completed in Patient Education Activity and on Care Plan with teaching response noted? yes    1300: I have reviewed discharge instructions, stroke signs and symptoms, follow up appt w/ Dr. Yordy Toney in 2wks, and discharge medications with the patient and caregiver. The patient and caregiver verbalized understanding. PIV d/c'd.    1320: Pt taken off unit w/ PCT and caregiver via wheelchair.

## 2019-02-02 NOTE — PROGRESS NOTES
2/1/19 2005 TRANSFER - IN REPORT:    Verbal report received from [facility] AT JOSE, RN(name) on Sigrid Schwartz  being received from PACU (unit) for routine post - op      Report consisted of patients Situation, Background, Assessment and   Recommendations(SBAR). Information from the following report(s) SBAR, Kardex, OR Summary, Procedure Summary, Intake/Output, MAR, Recent Results and Cardiac Rhythm Sinus Gerardo/NSR was reviewed with the receiving nurse. Opportunity for questions and clarification was provided. Assessment completed upon patients arrival to unit and care assumed. 2030 Patient arrived from PACU. Currently on Phenylephrine (see MAR) for SBP goal >100. Also on maintenance fluids. Rates of infusions verified. 2/2/19    0500 Offered patient bath, but patient declined due to expected discharge today. 4679 Patient off Phenylephrine. Gomez catheter removed. Up in chair. 0800 Bedside shift change report given to Samira Harris RN (oncoming nurse) by Stephen Patiño RN (offgoing nurse). Report included the following information SBAR, Kardex, OR Summary, Procedure Summary, Intake/Output, MAR, Recent Results and Cardiac Rhythm NSR/Sinus Ariana Bailey. Ramona Henderson

## 2019-02-02 NOTE — PERIOP NOTES
TRANSFER - OUT REPORT:    Verbal report given to Tor Rodriguez RN(name) on Alecia Montanez  being transferred to CVICU(unit) for routine post - op       Report consisted of patients Situation, Background, Assessment and   Recommendations(SBAR). Time Pre op antibiotic given:1133, 1903  Anesthesia Stop time: 1868  Gomez Present on Transfer to floor:yes  Order for Gomez on Chart:yes  Discharge Prescriptions with Chart:n/a    Information from the following report(s) SBAR, Kardex, OR Summary, Procedure Summary, Intake/Output, MAR, Recent Results and Cardiac Rhythm SR/SA was reviewed with the receiving nurse. Opportunity for questions and clarification was provided. Is the patient on 02? NO    Is the patient on a monitor? YES    Is the nurse transporting with the patient? YES    Surgical Waiting Area notified of patient's transfer from PACU? YES      The following personal items collected during your admission accompanied patient upon transfer:   Dental Appliance: Dental Appliances: None  Vision: Visual Aid: Glasses  Hearing Aid:    Jewelry: Jewelry: None  Clothing: Clothing: Other (comment)(bag of clothes and glasses returned to pt in PACU)  Other Valuables:  Other Valuables: Eyeglasses(GLASSES TO PACU)  Valuables sent to safe:

## 2019-02-02 NOTE — DISCHARGE INSTRUCTIONS
Patient Discharge Instructions    Juana Loya / [id number] : 1945    Admitted 2019 Discharged: 2019     Take Home Medications            · It is important that you take the medication exactly as they are prescribed. · Keep your medication in the bottles provided by the pharmacist and keep a list of the medication names, dosages, and times to be taken in your wallet. · Do not take other medications without consulting your doctor. What to do at Home    Recommended diet: Regular Diet,     Recommended activity: Activity as tolerated,      Follow-up with Dr Kate Judge in 2 weeks        Information obtained by :  I understand that if any problems occur once I am at home I am to contact my physician. I understand and acknowledge receipt of the instructions indicated above.                                                                                                                                            Physician's or R.N.'s Signature                                                                  Date/Time                                                                                                                                              Patient or Representative Signature                                                          Date/Time

## 2019-02-02 NOTE — PROGRESS NOTES
Problem: Carotid Endarterectomy Pathway: Day of Surgery (DAVE)  Goal: Activity/Safety  Outcome: Progressing Towards Goal  Progressing normally. Patient requiring Phenylephrine for BP support. Will stabilize BP and ambulate. Able to turn and sit up in bed without assistance. Goal: Nutrition/Diet  Outcome: Resolved/Met Date Met: 02/01/19  Tolerating liquids and solids without issue. Passed dysphagia screening. Goal: Medications  Outcome: Progressing Towards Goal  Provided education with medication administration. Patient verbalizes understanding. Goal: Respiratory  Outcome: Resolved/Met Date Met: 02/01/19  On room air with normal O2 saturation. Goal: Treatments/Interventions/Procedures  Outcome: Progressing Towards Goal  All ordered procedures completed. Continuous BP and cardiac monitoring. Goal: Psychosocial  Outcome: Resolved/Met Date Met: 02/01/19  Patient verbalizing feeling well and happy with progression.

## 2019-02-04 NOTE — DISCHARGE SUMMARY
[address]    DISCHARGE SUMMARY    Bonnie Valderrama  MR#: [id number]  : 1945  ACCOUNT #: [de-identified]   ADMIT DATE: 2019  DISCHARGE DATE: 2019    ADMITTING DIAGNOSES:  Critical right carotid stenosis with contralateral and left internal carotid artery occlusion. PROCEDURES PERFORMED:  Right carotid endarterectomy with Dacron patch. HOSPITAL COURSE:  A 36-year-old has a syncopal episode 5 days prior to admission. She was admitted overnight and evaluation showed no stroke with left carotid occlusion and right critical carotid stenosis. The patient apparently was sent home. We read her study and got in touch with Dr. Rafa Ordaz. The patient came to see me and had an outpatient CTA which confirmed the ultrasound findings. On 2019 she underwent uncomplicated right carotid endarterectomy with Dacron patch. Postoperatively, she is neurologically intact. She was watched overnight. Her drain was removed. She was discharged home on her usual medications. Tylenol for pain and ice packs. She will be seen in the office in 2 weeks.       MD MITCHELL Lopez/LN  D: 2019 10:10     T: 2019 12:45  JOB #: [id number]  CC: Everardo Dodson MD  CC: Jack Klein MD  CC: Jesse Ngo MD

## 2019-03-15 RX ORDER — POTASSIUM CHLORIDE 1500 MG/1
20 TABLET, FILM COATED, EXTENDED RELEASE ORAL DAILY
Qty: 90 TAB | Refills: 3 | Status: SHIPPED | OUTPATIENT
Start: 2019-03-15 | End: 2020-02-17

## 2019-03-15 NOTE — TELEPHONE ENCOUNTER
Requested Prescriptions     Signed Prescriptions Disp Refills    potassium chloride SR (K-TAB) 20 mEq tablet 90 Tab 3     Sig: Take 1 Tab by mouth daily.      Authorizing Provider: Alek Bonilla     Ordering User: Nic Webster     Per verbal orders

## 2019-03-18 RX ORDER — ASPIRIN 81 MG/1
81 TABLET ORAL DAILY
Qty: 90 TAB | Refills: 3 | Status: SHIPPED | OUTPATIENT
Start: 2019-03-18 | End: 2019-12-19 | Stop reason: SDUPTHER

## 2019-04-10 ENCOUNTER — TELEPHONE (OUTPATIENT)
Dept: CARDIOLOGY CLINIC | Age: 74
End: 2019-04-10

## 2019-04-10 NOTE — TELEPHONE ENCOUNTER
Patient states she had Surgery in her neck and feels that she does not need the vascular test. She is asking if that test can be canceled. She thinks Dr. Jaky Nair might have not known she had this surgery at the time they scheduled the test.     Please call back at [phone]    Thanks!

## 2019-04-10 NOTE — TELEPHONE ENCOUNTER
Returned patient's call, 2 pt identifiers used    Advised patient per NP Rajani Whitney she does not need her Carotid U/S. Her surgery note is in [facility] care. Krissy Augustine, ZACH Henry RN   Caller: Unspecified (Today,  9:51 AM)             Yes, that's fine.  Cancel her carotid and get records from Dr. Brisa Khan please. TEsting cancelled.     Future Appointments   Date Time Provider [facility]   4/23/2019 11:00 AM Nacho COOK [address] [place] [address]   4/23/2019 11:45 AM Jose Rodriguez  [address]

## 2019-04-23 ENCOUNTER — OFFICE VISIT (OUTPATIENT)
Dept: CARDIOLOGY CLINIC | Age: 74
End: 2019-04-23

## 2019-04-23 VITALS
OXYGEN SATURATION: 98 % | BODY MASS INDEX: 25.44 KG/M2 | DIASTOLIC BLOOD PRESSURE: 90 MMHG | HEIGHT: 64 IN | HEART RATE: 70 BPM | SYSTOLIC BLOOD PRESSURE: 150 MMHG | RESPIRATION RATE: 16 BRPM | WEIGHT: 149 LBS

## 2019-04-23 DIAGNOSIS — I10 HYPERTENSION, UNSPECIFIED TYPE: ICD-10-CM

## 2019-04-23 DIAGNOSIS — I25.10 CORONARY ARTERY DISEASE INVOLVING NATIVE HEART WITHOUT ANGINA PECTORIS, UNSPECIFIED VESSEL OR LESION TYPE: ICD-10-CM

## 2019-04-23 DIAGNOSIS — E11.21 TYPE 2 DIABETES WITH NEPHROPATHY (HCC): ICD-10-CM

## 2019-04-23 DIAGNOSIS — I50.21 CHF NYHA CLASS II, ACUTE, SYSTOLIC (HCC): ICD-10-CM

## 2019-04-23 DIAGNOSIS — I50.22 CHRONIC SYSTOLIC CONGESTIVE HEART FAILURE (HCC): ICD-10-CM

## 2019-04-23 DIAGNOSIS — I21.4 NSTEMI (NON-ST ELEVATED MYOCARDIAL INFARCTION) (HCC): ICD-10-CM

## 2019-04-23 DIAGNOSIS — I50.20 SYSTOLIC CONGESTIVE HEART FAILURE, UNSPECIFIED HF CHRONICITY (HCC): ICD-10-CM

## 2019-04-23 DIAGNOSIS — E11.59 TYPE 2 DIABETES MELLITUS WITH OTHER CIRCULATORY COMPLICATION, WITHOUT LONG-TERM CURRENT USE OF INSULIN (HCC): Primary | ICD-10-CM

## 2019-04-23 DIAGNOSIS — I71.9 AORTIC ANEURYSM WITHOUT RUPTURE, UNSPECIFIED PORTION OF AORTA (HCC): ICD-10-CM

## 2019-04-23 DIAGNOSIS — N18.2 CKD (CHRONIC KIDNEY DISEASE) STAGE 2, GFR 60-89 ML/MIN: ICD-10-CM

## 2019-04-23 RX ORDER — METOPROLOL SUCCINATE 25 MG/1
TABLET, EXTENDED RELEASE ORAL DAILY
COMMUNITY
End: 2019-12-19

## 2019-04-23 RX ORDER — EZETIMIBE 10 MG/1
10 TABLET ORAL DAILY
Qty: 90 TAB | Refills: 3 | Status: SHIPPED | OUTPATIENT
Start: 2019-04-23 | End: 2019-05-01

## 2019-04-23 NOTE — PROGRESS NOTES
[facility]  [phone]    HPI: Tapan Tinajero, a 68y.o. year-old who presents for follow up regarding her CHF. Echo today shows LVEF up to 35-40%  No chest pain or dyspnea, palpitations, dizziness or syncope   LE edema not bothering her  BP well controlled at home  Walks treadmill x 15 minutes/day  Reviewed code status, she wants to be a DNR, she expressed her desires for this at her last office visit too  Does not want CPR, defibrillation, intubation  She is going to [employer] and will ride a [employer] and an ATV- in Oct    LDL today still 1-8- goal <70, better but not at goal.   Has another episode of syncope but then went in for rCEA so that seems to have been the culprit. Assessment/Plan:  1.  NSTEMI/CAD - troponin peaked at 2.4, cardiac cath revealed significant RCA disease with collaterals and other non-obstructive CAD, no PCI needed, managing CAD medically  -continue ASA 81mg daily, Toprol XL, statin   3.  Dyslipidemia - , was not able to get the crestor due to insurance but better on the atorvastatin, not to goal will add zetia  4.  HTN - well controlled   5.  DM Type 2 - Hgb A1C 6.0%, management per PCP  6.  Vitamin D deficiency - level 43  7.  LBBB - since 2/18   8.  Hypokalemia - well controlled on KCL 20meq PO daily   9.  Left carotid bruit - will order carotid duplex to assess for stenosis  10. Chronic systolic heart failure - LVEF 15-20% by initial TTE 2/18, TTE today showed LVEF 35-40%,  NYHA Class I-II   -continue Toprol XL 25mg daily, losartan 25mg dialy and Lasix 40mg daily  -declines AICD, finds the idea extremely stressful and caused tears to discuss it, wants to be a DNR - filled out DNR form today and gave her the original    -she will follow up in the office in 3 months   -will trial entresto in its place.      Fam Hx: + early CAD, father passed from MI at age 62, mother passed at age 80 after MI with CHF, two brothers with CABG (age unknown)  Soc Hx: no tobacco use, no etoh use     Echo 1/19 - (prelim) LVEF 35-40%  Cardiac Cath 2/18 - Normal LM and LAD, 30% proximal LCX, RCA proximal 80% then mid 100%, fills well distally via left to right collaterals. LVEF 20%. Echo 2/18 - LV mildly dilated. LVEF 15-20%, severe diffuse hypokinesis, wall thickness was mildly increased, mild to mod MR, mild TR    She  has a past medical history of CAD (coronary artery disease) (02/26/2018), Congestive heart failure ([facility] [address] (02/26/2018), Diabetes ([facility] (02/26/2018), and Hypertension. Cardiovascular ROS: no chest pain or dyspnea on exertion  Respiratory ROS: no cough, shortness of breath, or wheezing  Neurological ROS: no TIA or stroke symptoms  All other systems negative except as above. PE  Vitals:    04/23/19 1146   BP: 150/90   Pulse: 70   Resp: 16   SpO2: 98%   Weight: 149 lb (67.6 kg)   Height: 5' 4\" (1.626 m)    Body mass index is 25.58 kg/m².    General appearance - alert, well appearing, and in no distress  Mental status - affect appropriate to mood  Eyes - sclera anicteric, moist mucous membranes  Neck - supple  Lymphatics - not assessed  Chest - clear to auscultation, no wheezes, rales or rhonchi  Heart - normal rate, regular rhythm, normal S1, S2, no murmurs, rubs, clicks or gallops  Abdomen - soft, nontender, nondistended, no masses or organomegaly  Back exam - full range of motion, no tenderness  Neurological - cranial nerves II through XII grossly intact, no focal deficit  Musculoskeletal - no muscular tenderness noted, normal strength  Extremities - peripheral pulses normal, trace LE edema    Skin - normal coloration  no rashes    Recent Labs:  Lab Results   Component Value Date/Time    Cholesterol, total 286 (H) 02/26/2018 01:27 AM    HDL Cholesterol 76 02/26/2018 01:27 AM    LDL, calculated 185.4 (H) 02/26/2018 01:27 AM    Triglyceride 123 02/26/2018 01:27 AM    CHOL/HDL Ratio 3.8 02/26/2018 01:27 AM     Lab Results   Component Value Date/Time    Creatinine 1.13 (H) 02/02/2019 03:24 AM     Lab Results   Component Value Date/Time    BUN 18 02/02/2019 03:24 AM    BUN (POC) 21 (H) 02/26/2018 01:50 AM     Lab Results   Component Value Date/Time    Potassium 4.0 02/02/2019 03:24 AM     Lab Results   Component Value Date/Time    Hemoglobin A1c 6.4 (H) 01/27/2019 02:27 AM     Lab Results   Component Value Date/Time    HGB 9.0 (L) 02/02/2019 03:24 AM     Lab Results   Component Value Date/Time    PLATELET 318 04/40/8446 03:24 AM       Reviewed:  Past Medical History:   Diagnosis Date    CAD (coronary artery disease) 02/26/2018    NSTEMI    Congestive heart failure ([facility] [address] 02/26/2018    Diabetes ([facility] 02/26/2018    pre-DM    Hypertension      Social History     Tobacco Use   Smoking Status Never Smoker   Smokeless Tobacco Never Used     Social History     Substance and Sexual Activity   Alcohol Use No     Allergies   Allergen Reactions    Adhesive Tape-Silicones Rash       Current Outpatient Medications   Medication Sig    metoprolol succinate (TOPROL-XL) 25 mg XL tablet Take  by mouth daily.  aspirin delayed-release 81 mg tablet TAKE 1 TAB BY MOUTH DAILY.  potassium chloride SR (K-TAB) 20 mEq tablet Take 1 Tab by mouth daily.  losartan (COZAAR) 25 mg tablet Take 1 Tab by mouth daily.  atorvastatin (LIPITOR) 40 mg tablet Take 1 Tab by mouth daily.  furosemide (LASIX) 40 mg tablet Take 1 Tab by mouth daily.  DOCOSAHEXANOIC ACID/EPA (FISH OIL PO) Take  by mouth daily.  multivitamin (ONE A DAY) tablet Take 1 Tab by mouth daily.  oxyCODONE IR (ROXICODONE) 5 mg immediate release tablet Take 1 Tab by mouth every four (4) hours as needed. Max Daily Amount: 30 mg.    metoprolol succinate (TOPROL-XL) 25 mg XL tablet Take 0.5 Tabs by mouth nightly. (Patient taking differently: Take 25 mg by mouth daily.)     No current facility-administered medications for this visit.         Erick Bhatia MD  [facility] [address] [place] [address] [address]  [facility], [address] [place] [address]  [phone]

## 2019-04-30 RX ORDER — EZETIMIBE 10 MG/1
10 TABLET ORAL DAILY
Qty: 90 TAB | Refills: 3 | OUTPATIENT
Start: 2019-04-30

## 2019-04-30 NOTE — TELEPHONE ENCOUNTER
Pt called with a few concerns she would like to discuss. She is asking to NOT speak with a nurse. She is requesting Dr. Aki Gipson.   Pharmacy confirmed  Pt is out of medication  Phone #[phone]  Thanks

## 2019-04-30 NOTE — TELEPHONE ENCOUNTER
Returned call to patient. 2 pt identifiers used    Patient had questions regarding her new medications. She states pharmacy has been trying to reach our office with questions about Zetia and Entresto. No missed calls noted. Call placed to [facility] pharmacy. Zetia cost $190/monthly, pt unable to afford. Please advise if a substitute for Zetia.

## 2019-05-01 NOTE — TELEPHONE ENCOUNTER
LVM for patient to return call at earliest convenience. Per Dr. Narendra Paniagua:    MD Anne Marie Estrada RN   Caller: Unspecified Raheem Gabriel,  2:38 PM)             welchol would be an alternate but if that's not covered she will just have to stay with atorvastatin      Patient returned call and above message given. She will stay on just Atorvastatin, as Welchol will also be very expensive.    2 pt identifiers used      Future Appointments   Date Time Provider Lorin Freya   7/18/2019  8:20 AM Grecia Fam  [address]

## 2019-05-22 ENCOUNTER — TELEPHONE (OUTPATIENT)
Dept: CARDIOLOGY CLINIC | Age: 74
End: 2019-05-22

## 2019-05-23 NOTE — TELEPHONE ENCOUNTER
Returned patient's call, 2 pt identifiers used    Patient states she cannot afford Entresto, $357 every 3 month. She does not want to apply for financial assistance and would prefer to go back on Losartan. Please advise what dosing.       _________________________________    Called patient and advised per Dr. Alma Carrero to stop Entresto and begin Losartan 25 mg daily. Patient verbalized understanding.   2 pt identifiers used

## 2019-05-24 RX ORDER — LOSARTAN POTASSIUM 25 MG/1
TABLET ORAL DAILY
COMMUNITY
End: 2019-07-18 | Stop reason: SDUPTHER

## 2019-06-24 RX ORDER — FUROSEMIDE 40 MG/1
TABLET ORAL
Qty: 90 TAB | Refills: 3 | Status: SHIPPED | OUTPATIENT
Start: 2019-06-24 | End: 2020-06-05

## 2019-07-18 ENCOUNTER — OFFICE VISIT (OUTPATIENT)
Dept: CARDIOLOGY CLINIC | Age: 74
End: 2019-07-18

## 2019-07-18 VITALS
WEIGHT: 148.2 LBS | SYSTOLIC BLOOD PRESSURE: 111 MMHG | HEIGHT: 64 IN | BODY MASS INDEX: 25.3 KG/M2 | RESPIRATION RATE: 16 BRPM | OXYGEN SATURATION: 100 % | HEART RATE: 90 BPM | DIASTOLIC BLOOD PRESSURE: 79 MMHG

## 2019-07-18 DIAGNOSIS — I50.20 SYSTOLIC CONGESTIVE HEART FAILURE, UNSPECIFIED HF CHRONICITY (HCC): ICD-10-CM

## 2019-07-18 DIAGNOSIS — I10 HYPERTENSION, UNSPECIFIED TYPE: ICD-10-CM

## 2019-07-18 DIAGNOSIS — E11.59 TYPE 2 DIABETES MELLITUS WITH OTHER CIRCULATORY COMPLICATION, WITHOUT LONG-TERM CURRENT USE OF INSULIN (HCC): ICD-10-CM

## 2019-07-18 DIAGNOSIS — I50.22 CHRONIC SYSTOLIC CONGESTIVE HEART FAILURE (HCC): ICD-10-CM

## 2019-07-18 DIAGNOSIS — I50.21 CHF NYHA CLASS II, ACUTE, SYSTOLIC (HCC): ICD-10-CM

## 2019-07-18 DIAGNOSIS — I50.9 CONGESTIVE HEART FAILURE, UNSPECIFIED HF CHRONICITY, UNSPECIFIED HEART FAILURE TYPE (HCC): ICD-10-CM

## 2019-07-18 DIAGNOSIS — I21.4 NSTEMI (NON-ST ELEVATED MYOCARDIAL INFARCTION) (HCC): ICD-10-CM

## 2019-07-18 DIAGNOSIS — I50.21 ACUTE SYSTOLIC HEART FAILURE (HCC): Primary | ICD-10-CM

## 2019-07-18 DIAGNOSIS — E78.5 DYSLIPIDEMIA, GOAL LDL BELOW 70: ICD-10-CM

## 2019-07-18 DIAGNOSIS — N18.2 CKD (CHRONIC KIDNEY DISEASE) STAGE 2, GFR 60-89 ML/MIN: ICD-10-CM

## 2019-07-18 RX ORDER — LOSARTAN POTASSIUM 25 MG/1
25 TABLET ORAL DAILY
Qty: 90 TAB | Refills: 3 | Status: SHIPPED | OUTPATIENT
Start: 2019-07-18 | End: 2020-07-16

## 2019-07-18 NOTE — PROGRESS NOTES
[facility]  [phone]    HPI: Ronak Pineda, a 68y.o. year-old who presents for follow up regarding her CHF. She is walking miles a day and feeling good with that. Taking her rmedications and feeling well. She feels well and wants to not come in the clinic so often. BP runs 100 at home, higher here. Nerves. Echo today shows LVEF up to 35-40%  No chest pain or dyspnea, palpitations, dizziness or syncope   LE edema not bothering her  BP well controlled at home  Reviewed code status, she wants to be a DNR, she expressed her desires for this at her last office visit too  Does not want CPR, defibrillation, intubation  She is going to [employer] and will ride a [employer] and an ATV- in Oct    LDL today still 1-8- goal <70, better but not at goal.   Had an episode of syncope but then went in for rCEA so that seems to have been the culprit. Assessment/Plan:  1.  NSTEMI/CAD - troponin peaked at 2.4, cardiac cath revealed significant RCA disease with collaterals and other non-obstructive CAD, no PCI needed, managing CAD medically  -continue ASA 81mg daily, Toprol XL, statin   3.  Dyslipidemia - , was not able to get the crestor due to insurance but better on the atorvastatin, not to goal will add zetia  4.  HTN - well controlled   5.  DM Type 2 - Hgb A1C 6.0%, management per PCP  6.  Vitamin D deficiency - level 43  7.  LBBB - since 2/18   8.  Hypokalemia - well controlled on KCL 20meq PO daily   9.  Left carotid bruit - will order carotid duplex to assess for stenosis  10.   Chronic systolic heart failure - LVEF 15-20% by initial TTE 2/18, TTE today showed LVEF 35-40%,  NYHA Class I-II   -continue Toprol XL 25mg daily, losartan 25mg daily and Lasix 40mg daily  -declines AICD, finds the idea extremely stressful and caused tears to discuss it, wants to be a DNR - filled out DNR form today and gave her the original    -she will follow up in the office in 6 months   -did not like entresto and will not retrial.       Fam Hx: + early CAD, father passed from MI at age 62, mother passed at age 80 after MI with CHF, two brothers with CABG (age unknown)  Soc Hx: no tobacco use, no etoh use     Echo 1/19 - (prelim) LVEF 35-40%  Cardiac Cath 2/18 - Normal LM and LAD, 30% proximal LCX, RCA proximal 80% then mid 100%, fills well distally via left to right collaterals. LVEF 20%. Echo 2/18 - LV mildly dilated. LVEF 15-20%, severe diffuse hypokinesis, wall thickness was mildly increased, mild to mod MR, mild TR    She  has a past medical history of CAD (coronary artery disease) (02/26/2018), Congestive heart failure ([facility] [address] (02/26/2018), Diabetes ([facility] (02/26/2018), and Hypertension. Cardiovascular ROS: no chest pain or dyspnea on exertion  Respiratory ROS: no cough, shortness of breath, or wheezing  Neurological ROS: no TIA or stroke symptoms  All other systems negative except as above. PE  Vitals:    07/18/19 0814   BP: 140/80   Pulse: 90   Resp: 16   SpO2: 100%   Weight: 148 lb 3.2 oz (67.2 kg)   Height: 5' 4\" (1.626 m)    Body mass index is 25.44 kg/m².    General appearance - alert, well appearing, and in no distress  Mental status - affect appropriate to mood  Eyes - sclera anicteric, moist mucous membranes  Neck - supple  Lymphatics - not assessed  Chest - clear to auscultation, no wheezes, rales or rhonchi  Heart - normal rate, regular rhythm, normal S1, S2, no murmurs, rubs, clicks or gallops  Abdomen - soft, nontender, nondistended, no masses or organomegaly  Back exam - full range of motion, no tenderness  Neurological - cranial nerves II through XII grossly intact, no focal deficit  Musculoskeletal - no muscular tenderness noted, normal strength  Extremities - peripheral pulses normal, trace LE edema    Skin - normal coloration  no rashes    Recent Labs:  Lab Results   Component Value Date/Time    Cholesterol, total 286 (H) 02/26/2018 01:27 AM    HDL Cholesterol 76 02/26/2018 01:27 AM LDL, calculated 185.4 (H) 02/26/2018 01:27 AM    Triglyceride 123 02/26/2018 01:27 AM    CHOL/HDL Ratio 3.8 02/26/2018 01:27 AM     Lab Results   Component Value Date/Time    Creatinine 1.13 (H) 02/02/2019 03:24 AM     Lab Results   Component Value Date/Time    BUN 18 02/02/2019 03:24 AM    BUN (POC) 21 (H) 02/26/2018 01:50 AM     Lab Results   Component Value Date/Time    Potassium 4.0 02/02/2019 03:24 AM     Lab Results   Component Value Date/Time    Hemoglobin A1c 6.4 (H) 01/27/2019 02:27 AM     Lab Results   Component Value Date/Time    HGB 9.0 (L) 02/02/2019 03:24 AM     Lab Results   Component Value Date/Time    PLATELET 107 91/14/1583 03:24 AM       Reviewed:  Past Medical History:   Diagnosis Date    CAD (coronary artery disease) 02/26/2018    NSTEMI    Congestive heart failure ([facility] [address] 02/26/2018    Diabetes ([facility] [address] 02/26/2018    pre-DM    Hypertension      Social History     Tobacco Use   Smoking Status Never Smoker   Smokeless Tobacco Never Used     Social History     Substance and Sexual Activity   Alcohol Use No     Allergies   Allergen Reactions    Adhesive Tape-Silicones Rash       Current Outpatient Medications   Medication Sig    furosemide (LASIX) 40 mg tablet TAKE 1 TABLET BY MOUTH EVERY DAY    losartan (COZAAR) 25 mg tablet Take  by mouth daily.  aspirin delayed-release 81 mg tablet TAKE 1 TAB BY MOUTH DAILY.  potassium chloride SR (K-TAB) 20 mEq tablet Take 1 Tab by mouth daily.  metoprolol succinate (TOPROL-XL) 25 mg XL tablet Take 0.5 Tabs by mouth nightly. (Patient taking differently: Take 25 mg by mouth daily.)    atorvastatin (LIPITOR) 40 mg tablet Take 1 Tab by mouth daily.  DOCOSAHEXANOIC ACID/EPA (FISH OIL PO) Take  by mouth daily.  multivitamin (ONE A DAY) tablet Take 1 Tab by mouth daily.  metoprolol succinate (TOPROL-XL) 25 mg XL tablet Take  by mouth daily.     oxyCODONE IR (ROXICODONE) 5 mg immediate release tablet Take 1 Tab by mouth every four (4) hours as needed. Max Daily Amount: 30 mg. No current facility-administered medications for this visit.         Racheal Reeves MD  [facility] [address] [facility] [address], [address] [address]  Luciano Duncan  [phone]

## 2019-12-07 LAB
CREATININE, EXTERNAL: 1.16
HBA1C MFR BLD HPLC: 6.3 %
LDL-C, EXTERNAL: 117

## 2019-12-19 ENCOUNTER — OFFICE VISIT (OUTPATIENT)
Dept: CARDIOLOGY CLINIC | Age: 74
End: 2019-12-19

## 2019-12-19 VITALS
OXYGEN SATURATION: 99 % | HEART RATE: 61 BPM | SYSTOLIC BLOOD PRESSURE: 138 MMHG | BODY MASS INDEX: 25.27 KG/M2 | RESPIRATION RATE: 16 BRPM | WEIGHT: 148 LBS | HEIGHT: 64 IN | DIASTOLIC BLOOD PRESSURE: 86 MMHG

## 2019-12-19 DIAGNOSIS — N18.2 CKD (CHRONIC KIDNEY DISEASE) STAGE 2, GFR 60-89 ML/MIN: ICD-10-CM

## 2019-12-19 DIAGNOSIS — I25.10 CORONARY ARTERY DISEASE INVOLVING NATIVE CORONARY ARTERY OF NATIVE HEART WITHOUT ANGINA PECTORIS: ICD-10-CM

## 2019-12-19 DIAGNOSIS — E11.21 TYPE 2 DIABETES WITH NEPHROPATHY (HCC): ICD-10-CM

## 2019-12-19 DIAGNOSIS — I50.22 CHRONIC SYSTOLIC CONGESTIVE HEART FAILURE (HCC): ICD-10-CM

## 2019-12-19 DIAGNOSIS — I25.10 CORONARY ARTERY DISEASE INVOLVING NATIVE HEART WITHOUT ANGINA PECTORIS, UNSPECIFIED VESSEL OR LESION TYPE: ICD-10-CM

## 2019-12-19 DIAGNOSIS — I73.9 PAD (PERIPHERAL ARTERY DISEASE) (HCC): ICD-10-CM

## 2019-12-19 DIAGNOSIS — R09.89 BRUIT OF LEFT CAROTID ARTERY: ICD-10-CM

## 2019-12-19 DIAGNOSIS — J96.01 ACUTE RESPIRATORY FAILURE WITH HYPOXIA (HCC): ICD-10-CM

## 2019-12-19 DIAGNOSIS — I10 HYPERTENSION, UNSPECIFIED TYPE: ICD-10-CM

## 2019-12-19 DIAGNOSIS — I50.21 ACUTE SYSTOLIC HEART FAILURE (HCC): ICD-10-CM

## 2019-12-19 DIAGNOSIS — E78.5 DYSLIPIDEMIA, GOAL LDL BELOW 70: ICD-10-CM

## 2019-12-19 DIAGNOSIS — I21.4 NSTEMI (NON-ST ELEVATED MYOCARDIAL INFARCTION) (HCC): ICD-10-CM

## 2019-12-19 DIAGNOSIS — I50.21 CHF NYHA CLASS II, ACUTE, SYSTOLIC (HCC): Primary | ICD-10-CM

## 2019-12-19 DIAGNOSIS — I44.7 LBBB (LEFT BUNDLE BRANCH BLOCK): ICD-10-CM

## 2019-12-19 RX ORDER — ASPIRIN 81 MG/1
81 TABLET ORAL DAILY
Qty: 90 TAB | Refills: 3 | Status: SHIPPED | OUTPATIENT
Start: 2019-12-19 | End: 2021-01-28

## 2019-12-19 RX ORDER — ROSUVASTATIN CALCIUM 40 MG/1
40 TABLET, COATED ORAL
Qty: 90 TAB | Refills: 3 | Status: SHIPPED | OUTPATIENT
Start: 2019-12-19 | End: 2020-06-25 | Stop reason: ALTCHOICE

## 2019-12-19 RX ORDER — CLOPIDOGREL BISULFATE 75 MG/1
TABLET ORAL
COMMUNITY
Start: 2019-11-18 | End: 2020-12-08 | Stop reason: SDUPTHER

## 2019-12-19 NOTE — PROGRESS NOTES
[facility]  [phone]    HPI: Ovidio Canales, a 69y.o. year-old who presents for follow up regarding her CHF. She is walking miles a day and feeling good with that. Taking her rmedications and feeling well. She feels well and wants to not come in the clinic so often. BP runs 100 at home, higher here. Nerves. Echo today shows LVEF up to 35-40%  No chest pain or dyspnea, palpitations, dizziness or syncope   LE edema not bothering her  BP well controlled at home  Reviewed code status, she wants to be a DNR, she expressed her desires for this at her last office visit too  Does not want CPR, defibrillation, intubation  [place] is going [place] [email or web address] and will ride a [employer] and an ATV- in Oct    LDL today still 1-8- goal <70, better but not at goal.   Had an episode of syncope but then went in for rCEA so that seems to have been the culprit. LDL today 117 goal <70, will change lipitor to rosuvastatin  She wants to stop plavix but she will check on that with Dr. Deejay Norris. Also will have carotid ultrasound in Feb.     Assessment/Plan:  1.  NSTEMI/CAD - troponin peaked at 2.4, cardiac cath revealed significant RCA disease with collaterals and other non-obstructive CAD, no PCI needed, managing CAD medically  -continue ASA 81mg daily, Toprol XL, statin   3.  Dyslipidemia - , was not able to get the crestor due to insurance but better on the atorvastatin, not to goal will add zetia  4.  HTN - well controlled   5.  DM Type 2 - Hgb A1C 6.0%, management per PCP  6.  Vitamin D deficiency - level 43  7.  LBBB - since 2/18   8.  Hypokalemia - well controlled on KCL 20meq PO daily   9.  Left carotid bruit - will order carotid duplex to assess for stenosis  10.   Chronic systolic heart failure - LVEF 15-20% by initial TTE 2/18, TTE today showed LVEF 35-40%,  NYHA Class I-II   -continue Toprol XL 25mg daily, losartan 25mg daily and Lasix 40mg daily  -declines AICD, finds the idea extremely stressful and caused tears to discuss it, wants to be a DNR - filled out DNR form today and gave her the original    -she will follow up in the office in 6 months her pref  -did not like entresto and will not retrial.   -Needs carotid ultrasound, left bruit today. Fam Hx: + early CAD, father passed from MI at age 62, mother passed at age 80 after MI with CHF, two brothers with CABG (age unknown)  Soc Hx: no tobacco use, no etoh use     Echo 1/19 - (prelim) LVEF 35-40%  Cardiac Cath 2/18 - Normal LM and LAD, 30% proximal LCX, RCA proximal 80% then mid 100%, fills well distally via left to right collaterals. LVEF 20%. Echo 2/18 - LV mildly dilated. LVEF 15-20%, severe diffuse hypokinesis, wall thickness was mildly increased, mild to mod MR, mild TR    She  has a past medical history of CAD (coronary artery disease) (02/26/2018), Congestive heart failure ([facility] [address] (02/26/2018), Diabetes ([facility] [address] (02/26/2018), and Hypertension. Cardiovascular ROS: no chest pain or dyspnea on exertion  Respiratory ROS: no cough, shortness of breath, or wheezing  Neurological ROS: no TIA or stroke symptoms  All other systems negative except as above. PE  Vitals:    12/19/19 1135   BP: 138/86   Pulse: 61   Resp: 16   SpO2: 99%   Weight: 148 lb (67.1 kg)   Height: 5' 4\" (1.626 m)    Body mass index is 25.4 kg/m².    General appearance - alert, well appearing, and in no distress  Mental status - affect appropriate to mood  Eyes - sclera anicteric, moist mucous membranes  Neck - supple l carotid bruit- loud  Lymphatics - not assessed  Chest - clear to auscultation, no wheezes, rales or rhonchi  Heart - normal rate, regular rhythm, normal S1, S2, no murmurs, rubs, clicks or gallops  Abdomen - soft, nontender, nondistended, no masses or organomegaly  Back exam - full range of motion, no tenderness  Neurological - cranial nerves II through XII grossly intact, no focal deficit  Musculoskeletal - no muscular tenderness noted, normal strength  Extremities - peripheral pulses normal, trace LE edema    Skin - normal coloration  no rashes    Recent Labs:  Lab Results   Component Value Date/Time    Cholesterol, total 286 (H) 02/26/2018 01:27 AM    HDL Cholesterol 76 02/26/2018 01:27 AM    LDL, calculated 185.4 (H) 02/26/2018 01:27 AM    Triglyceride 123 02/26/2018 01:27 AM    CHOL/HDL Ratio 3.8 02/26/2018 01:27 AM     Lab Results   Component Value Date/Time    Creatinine 1.13 (H) 02/02/2019 03:24 AM     Lab Results   Component Value Date/Time    BUN 18 02/02/2019 03:24 AM    BUN (POC) 21 (H) 02/26/2018 01:50 AM     Lab Results   Component Value Date/Time    Potassium 4.0 02/02/2019 03:24 AM     Lab Results   Component Value Date/Time    Hemoglobin A1c 6.4 (H) 01/27/2019 02:27 AM     Lab Results   Component Value Date/Time    HGB 9.0 (L) 02/02/2019 03:24 AM     Lab Results   Component Value Date/Time    PLATELET 517 73/83/1078 03:24 AM       Reviewed:  Past Medical History:   Diagnosis Date    CAD (coronary artery disease) 02/26/2018    NSTEMI    Congestive heart failure ([facility] 02/26/2018    [facility] ([facility] 02/26/2018    pre-DM    Hypertension      Social History     Tobacco Use   Smoking Status Never Smoker   Smokeless Tobacco Never Used     Social History     Substance and Sexual Activity   Alcohol Use No     Allergies   Allergen Reactions    Adhesive Tape-Silicones Rash       Current Outpatient Medications   Medication Sig    clopidogrel (PLAVIX) 75 mg tab TAKE 1 TABLET BY MOUTH EVERY DAY    losartan (COZAAR) 25 mg tablet Take 1 Tab by mouth daily.  furosemide (LASIX) 40 mg tablet TAKE 1 TABLET BY MOUTH EVERY DAY    aspirin delayed-release 81 mg tablet TAKE 1 TAB BY MOUTH DAILY.  potassium chloride SR (K-TAB) 20 mEq tablet Take 1 Tab by mouth daily.  metoprolol succinate (TOPROL-XL) 25 mg XL tablet Take 0.5 Tabs by mouth nightly.  (Patient taking differently: Take 25 mg by mouth daily.)    atorvastatin (LIPITOR) 40 mg tablet Take 1 Tab by mouth daily.  DOCOSAHEXANOIC ACID/EPA (FISH OIL PO) Take  by mouth daily.  multivitamin (ONE A DAY) tablet Take 1 Tab by mouth daily. No current facility-administered medications for this visit.         Liz Goss MD  [facility] [address] [place] [address] [address], [address] [address]  [place]  [phone]

## 2020-01-23 RX ORDER — ATORVASTATIN CALCIUM 40 MG/1
TABLET, FILM COATED ORAL
Qty: 90 TAB | Refills: 3 | Status: SHIPPED | OUTPATIENT
Start: 2020-01-23 | End: 2021-01-28

## 2020-02-17 RX ORDER — POTASSIUM CHLORIDE 1500 MG/1
TABLET, EXTENDED RELEASE ORAL
Qty: 90 TAB | Refills: 3 | Status: SHIPPED | OUTPATIENT
Start: 2020-02-17 | End: 2021-03-09

## 2020-06-05 RX ORDER — FUROSEMIDE 40 MG/1
TABLET ORAL
Qty: 90 TAB | Refills: 3 | Status: SHIPPED | OUTPATIENT
Start: 2020-06-05 | End: 2021-06-01

## 2020-06-24 LAB
ECHO AO ASC DIAM: 3.93 CM
ECHO AO ROOT DIAM: 3.01 CM
ECHO AR MAX VEL PISA: 167.94 CM/S
ECHO AR MAX VEL PISA: 233.02 CM/S
ECHO AR MAX VEL PISA: 69.34 CM/S
ECHO AV AREA PEAK VELOCITY: 1.63 CM2
ECHO AV AREA VTI: 1.73 CM2
ECHO AV AREA/BSA PEAK VELOCITY: 0.9 CM2/M2
ECHO AV AREA/BSA VTI: 1 CM2/M2
ECHO AV MEAN GRADIENT: 6.12 MMHG
ECHO AV PEAK GRADIENT: 11.33 MMHG
ECHO AV VTI: 32.23 CM
ECHO EST RA PRESSURE: 3 MMHG
ECHO IVC PROX: 1.06 CM
ECHO LA AREA 4C: 20.75 CM2
ECHO LA MAJOR AXIS: 3.91 CM
ECHO LA MINOR AXIS: 2.27 CM
ECHO LA VOL 2C: 75.8 ML (ref 22–52)
ECHO LA VOL 4C: 66.94 ML (ref 22–52)
ECHO LA VOL BP: 74.74 ML (ref 22–52)
ECHO LA VOL/BSA BIPLANE: 43.43 ML/M2 (ref 16–28)
ECHO LA VOLUME INDEX A2C: 44.04 ML/M2 (ref 16–28)
ECHO LA VOLUME INDEX A4C: 38.89 ML/M2 (ref 16–28)
ECHO LV E' LATERAL VELOCITY: 9.02 CM/S
ECHO LV E' SEPTAL VELOCITY: 6.49 CM/S
ECHO LV EDV A2C: 96.52 ML
ECHO LV EDV A4C: 98.4 ML
ECHO LV EDV BP: 101.15 ML (ref 56–104)
ECHO LV EDV INDEX A4C: 57.2 ML/M2
ECHO LV EDV INDEX BP: 58.8 ML/M2
ECHO LV EDV NDEX A2C: 56.1 ML/M2
ECHO LV EJECTION FRACTION A2C: 40 PERCENT
ECHO LV EJECTION FRACTION A4C: 35 PERCENT
ECHO LV EJECTION FRACTION BIPLANE: 39.1 PERCENT (ref 55–100)
ECHO LV ESV A2C: 58.33 ML
ECHO LV ESV A4C: 63.77 ML
ECHO LV ESV BP: 61.6 ML (ref 19–49)
ECHO LV ESV INDEX A2C: 33.9 ML/M2
ECHO LV ESV INDEX A4C: 37.1 ML/M2
ECHO LV ESV INDEX BP: 35.8 ML/M2
ECHO LV INTERNAL DIMENSION DIASTOLIC: 4.19 CM (ref 3.9–5.3)
ECHO LV INTERNAL DIMENSION SYSTOLIC: 3.27 CM
ECHO LV IVSD: 1.33 CM (ref 0.6–0.9)
ECHO LV POSTERIOR WALL DIASTOLIC: 1.37 CM (ref 0.6–0.9)
ECHO LVOT DIAM: 2.24 CM
ECHO LVOT PEAK GRADIENT: 1.92 MMHG
ECHO LVOT SV: 55.6 ML
ECHO LVOT VTI: 14.1 CM
ECHO MV A VELOCITY: 90.43 CM/S
ECHO MV AREA PHT: 3.57 CM2
ECHO MV E DECELERATION TIME (DT): 0.21 S
ECHO MV E VELOCITY: 45.04 CM/S
ECHO MV E/A RATIO: 0.5
ECHO MV E/E' LATERAL: 4.99
ECHO MV E/E' RATIO (AVERAGED): 5.97
ECHO MV E/E' SEPTAL: 6.94
ECHO MV PRESSURE HALF TIME (PHT): 0.06 S
ECHO PULMONARY ARTERY SYSTOLIC PRESSURE (PASP): 25 MMHG
ECHO RA MAJOR AXIS: 3.18 CM
ECHO RIGHT VENTRICULAR SYSTOLIC PRESSURE (RVSP): 25 MMHG
ECHO RV INTERNAL DIMENSION: 3.01 CM
ECHO RV TAPSE: 2.34 CM (ref 1.5–2)
ECHO TRICUSPID ANNULAR PEAK SYSTOLIC VELOCITY: 12.4 CM/S
ECHO TV REGURGITANT PEAK GRADIENT: 21.81 MMHG
LVOT MG: 1.13 MMHG

## 2020-06-25 ENCOUNTER — VIRTUAL VISIT (OUTPATIENT)
Dept: CARDIOLOGY CLINIC | Age: 75
End: 2020-06-25

## 2020-06-25 DIAGNOSIS — I50.21 ACUTE SYSTOLIC HEART FAILURE (HCC): ICD-10-CM

## 2020-06-25 DIAGNOSIS — I50.21 CHF NYHA CLASS II, ACUTE, SYSTOLIC (HCC): Primary | ICD-10-CM

## 2020-06-25 DIAGNOSIS — I10 HYPERTENSION, UNSPECIFIED TYPE: ICD-10-CM

## 2020-06-25 DIAGNOSIS — I21.4 NSTEMI (NON-ST ELEVATED MYOCARDIAL INFARCTION) (HCC): ICD-10-CM

## 2020-06-25 DIAGNOSIS — I25.10 CORONARY ARTERY DISEASE INVOLVING NATIVE HEART WITHOUT ANGINA PECTORIS, UNSPECIFIED VESSEL OR LESION TYPE: ICD-10-CM

## 2020-06-25 DIAGNOSIS — I73.9 PAD (PERIPHERAL ARTERY DISEASE) (HCC): ICD-10-CM

## 2020-06-25 DIAGNOSIS — E78.5 DYSLIPIDEMIA, GOAL LDL BELOW 70: ICD-10-CM

## 2020-06-25 DIAGNOSIS — I50.22 CHRONIC SYSTOLIC CONGESTIVE HEART FAILURE (HCC): ICD-10-CM

## 2020-06-25 DIAGNOSIS — N18.2 CKD (CHRONIC KIDNEY DISEASE) STAGE 2, GFR 60-89 ML/MIN: ICD-10-CM

## 2020-06-25 DIAGNOSIS — E11.21 TYPE 2 DIABETES WITH NEPHROPATHY (HCC): ICD-10-CM

## 2020-06-25 NOTE — PROGRESS NOTES
VIRTUAL VISIT DOCUMENTATION     Pursuant to the emergency declaration under the [facility] waiver authority and the [employer] Act, this Virtual  Visit was conducted, with patient's consent, to reduce the patient's risk of exposure to COVID-19 and provide continuity of care for an established patient. Services were provided through a video synchronous discussion virtually to substitute for in-person clinic visit. CHIEF COMPLAINT      Fabián Alfaro is a 69 y.o. female who was seen by synchronous (real-time) audio-video technology on 6/25/2020. Patient is being seen today for followup of her CHF. She has been out in the garden quite a bit and working every day pretty much, takes breaks when she needs to but overall doing very well. She has gained a few pounds but is working on it. Just had labs and they were from Dr. DUNLAP [facility] will review. ASSESSMENT    Stopped the rosuvastatin for cost.   Back on the atorvastatin. Dr. DUNLAP [facility] in October. Will follow-up      PLAN   1.  NSTEMI/CAD - troponin peaked at 2.4, cardiac cath revealed significant RCA disease with collaterals and other non-obstructive CAD, no PCI needed, managing CAD medically  -continue ASA 81mg daily, Toprol XL, statin   3.  Dyslipidemia - , was not able to get the crestor due to insurance but better on the atorvastatin, not to goal will add zetia  4.   HTN - well controlled   5.  DM Type 2 - Hgb A1C 6.0%, management per PCP  6.  Vitamin D deficiency - level 43  7.  LBBB - since 2/18   8.  Hypokalemia - well controlled on KCL 20meq PO daily   9.  Left carotid bruit - will order carotid duplex to assess for stenosis  10.  Chronic systolic heart failure - LVEF 15-20% by initial TTE 2/18, TTE today showed LVEF 35-40%,  NYHA Class I-II   -continue Toprol XL 25mg daily, losartan 25mg daily and Lasix 40mg daily  -declines AICD, finds the idea extremely stressful and caused tears to discuss it, wants to be a DNR - filled out DNR form today and gave her the original    -she will follow up in the office in 6 months her pref  -did not like entresto and will not retrial.   -Needs carotid ultrasound, seeing Dr. Fred Cardona about once a year.         Fam Hx: + early CAD, father passed from MI at age 62, mother passed at age 80 after MI with CHF, two brothers with CABG (age unknown)  Soc Hx: no tobacco use, no etoh use     Echo 1/19 - (prelim) LVEF 35-40%  Cardiac Cath 2/18 - Normal LM and LAD, 30% proximal LCX, RCA proximal 80% then mid 100%, fills well distally via left to right collaterals.  LVEF 20%. Echo 2/18 - LV mildly dilated. LVEF 15-20%, severe diffuse hypokinesis, wall thickness was mildly increased, mild to mod MR, mild TR    We discussed the expected course, resolution and complications of the diagnosis(es) in detail. Medication risks, benefits, costs, interactions, and alternatives were discussed as indicated. I advised her to contact the office if her condition worsens, changes or fails to improve as anticipated.  She expressed understanding with the diagnosis(es) and plan    HISTORY OF PRESENTING ILLNESS      Bob Daigle is a 69 y.o. female        [address] LIST     Patient Active Problem List    Diagnosis Date Noted    Carotid stenosis, right 02/01/2019    Internal carotid artery stenosis, right 02/01/2019    Syncope 01/28/2019    Syncope and collapse 01/26/2019    Type 2 diabetes with nephropathy ([address] 05/30/2018    CHF NYHA class II, acute, systolic ([address] 91/71/6851    Coronary artery disease involving native coronary artery of native heart without angina pectoris 04/17/2018    Dyslipidemia, goal LDL below 70 04/17/2018    LBBB (left bundle branch block) 04/17/2018    Diabetes mellitus ([address] 04/17/2018    Hypokalemia 03/01/2018    Pre-diabetes 03/01/2018    HTN (hypertension) 03/01/2018    Hypercholesteremia 03/01/2018  Acute systolic heart failure ([facility] 02/27/2018    Acute metabolic encephalopathy 81/63/5710    NSTEMI (non-ST elevated myocardial infarction) ([facility] 02/26/2018    Acute respiratory failure with hypoxia ([facility] 02/26/2018           PAST MEDICAL HISTORY     Past Medical History:   Diagnosis Date    CAD (coronary artery disease) 02/26/2018    NSTEMI    Congestive heart failure ([facility] 02/26/2018    Diabetes ([facility] 02/26/2018    pre-DM    Hypertension            PAST SURGICAL HISTORY     Past Surgical History:   Procedure Laterality Date    HX HEENT Bilateral 05/2012    cataracts          ALLERGIES     Allergies   Allergen Reactions    Adhesive Tape-Silicones Rash          FAMILY HISTORY     Family History   Problem Relation Age of Onset    Heart Disease Mother     Heart Disease Father 62        MI    Diabetes Sister     Heart Disease Brother 71        CABG    Cancer Maternal Grandmother     Pulmonary Embolism Paternal Grandmother     Heart Disease Paternal Grandfather     Heart Disease Brother 59        CABG    Cancer Brother     Liver Disease Brother         HEPATITIS    No Known Problems Daughter     Other Daughter         KIDNEY ISSUES, UNKNOWN NAME    No Known Problems Daughter     Anesth Problems Neg Hx     negative for cardiac disease       SOCIAL HISTORY     Social History     Socioeconomic History    Marital status: SINGLE     Spouse name: Not on file    Number of children: Not on file    Years of education: Not on file    Highest education level: Not on file   Tobacco Use    Smoking status: Never Smoker    Smokeless tobacco: Never Used   Substance and Sexual Activity    Alcohol use: No    Drug use: No         MEDICATIONS     Current Outpatient Medications   Medication Sig    furosemide (LASIX) 40 mg tablet TAKE 1 TABLET BY MOUTH EVERY DAY    KLOR-CON M20 20 mEq tablet TAKE 1 TABLET BY MOUTH EVERY DAY    atorvastatin (LIPITOR) 40 mg tablet TAKE 1 TABLET BY MOUTH EVERY DAY    clopidogrel (PLAVIX) 75 mg tab TAKE 1 TABLET BY MOUTH EVERY DAY    aspirin delayed-release 81 mg tablet Take 1 Tab by mouth daily.  losartan (COZAAR) 25 mg tablet Take 1 Tab by mouth daily.  metoprolol succinate (TOPROL-XL) 25 mg XL tablet Take 0.5 Tabs by mouth nightly. (Patient taking differently: Take 25 mg by mouth daily.)    DOCOSAHEXANOIC ACID/EPA (FISH OIL PO) Take  by mouth daily.  multivitamin (ONE A DAY) tablet Take 1 Tab by mouth daily. No current facility-administered medications for this visit. I have reviewed the nurses notes, vitals, problem list, allergy list, medical history, family, social history and medications. REVIEW OF SYMPTOMS     Constitutional: Negative for fever, chills, malaise/fatigue and diaphoresis. Respiratory: Negative for cough, hemoptysis, sputum production, shortness of breath and wheezing. Cardiovascular: Negative for chest pain, palpitations, orthopnea, claudication, leg swelling and PND. Gastrointestinal: Negative for heartburn, nausea, vomiting, blood in stool and melena. Genitourinary: Negative for dysuria and flank pain. Musculoskeletal: Negative for joint pain and back pain. Skin: Negative for rash. Neurological: Negative for focal weakness, seizures, loss of consciousness, weakness and headaches. Endo/Heme/Allergies: Negative for abnormal bleeding. Psychiatric/Behavioral: Negative for memory loss. PHYSICAL EXAMINATION      Due to this being a TeleHealth evaluation, many elements of the physical examination are unable to be assessed. General: Well developed, in no acute distress, cooperative and alert  HEENT: Pupils equal/round. No marked JVD visible on video. Respiratory: No audible wheezing, no signs of respiratory distress, lips non cyanotic  Extremities:  No edema  Neuro: A&Ox3, speech clear, no facial droop, answering questions appropriately  Skin: Skin color is normal. No rashes or lesions.  Non diaphoretic on visible skin during exam       DIAGNOSTIC DATA      Cardiac Cath 2/18 - Normal LM and LAD, 30% proximal LCX, RCA proximal 80% then mid 100%, fills well distally via left to right collaterals. LVEF 20%. Echo 2/18 - LV mildly dilated. LVEF 15-20%, severe diffuse hypokinesis, wall thickness was mildly increased, mild to mod MR, mild TR       LABORATORY DATA      Lab Results   Component Value Date/Time    WBC 9.7 02/02/2019 03:24 AM    Hemoglobin (POC) 12.6 02/26/2018 01:50 AM    HGB 9.0 (L) 02/02/2019 03:24 AM    Hematocrit (POC) 37 02/26/2018 01:50 AM    HCT 28.3 (L) 02/02/2019 03:24 AM    PLATELET 367 84/53/7771 03:24 AM    MCV 98.6 02/02/2019 03:24 AM      Lab Results   Component Value Date/Time    Sodium 141 02/02/2019 03:24 AM    Potassium 4.0 02/02/2019 03:24 AM    Chloride 107 02/02/2019 03:24 AM    CO2 25 02/02/2019 03:24 AM    Anion gap 9 02/02/2019 03:24 AM    Glucose 127 (H) 02/02/2019 03:24 AM    BUN 18 02/02/2019 03:24 AM    Creatinine 1.13 (H) 02/02/2019 03:24 AM    BUN/Creatinine ratio 16 02/02/2019 03:24 AM    GFR est AA 57 (L) 02/02/2019 03:24 AM    GFR est non-AA 47 (L) 02/02/2019 03:24 AM    Calcium 8.5 02/02/2019 03:24 AM    Bilirubin, total 0.5 01/27/2019 02:27 AM    Alk. phosphatase 80 01/27/2019 02:27 AM    Protein, total 8.0 01/27/2019 02:27 AM    Albumin 3.6 01/27/2019 02:27 AM    Globulin 4.4 (H) 01/27/2019 02:27 AM    A-G Ratio 0.8 (L) 01/27/2019 02:27 AM    ALT (SGPT) 19 01/27/2019 02:27 AM             FOLLOW-UP     Follow-up and Dispositions              Patient was made aware and verbalized understanding that an appointment will be scheduled for them for a virtual visit and/or office visit within the above time frame. Patient understanding his/her responsibility to call and change time/date if he/she so chooses. Thank you, Lia Barillas MD for allowing me to participate in the care of Arthur Fitzpatrick. Please do not hesitate to contact me for further questions/concerns.      Greater than 20 minutes was spent in direct video patient care, planning and chart review. This visit was conducted using [employer] telemedicine services.        Wily Bull MD    [address]        [phone] / [phone] Fax       [facility], [address] [address]  Josefina Duncan  [phone] / [phone] Fax

## 2020-06-25 NOTE — PATIENT INSTRUCTIONS
MD Garo Rivera in person or virtual as she prefers in December thanks 6/25/2020 at 5:53 left message for patient to call to schedule 6 month appointment with Dr. Arlyn Dominique, virtual or in office

## 2020-06-26 ENCOUNTER — TELEPHONE (OUTPATIENT)
Dept: CARDIOLOGY CLINIC | Age: 75
End: 2020-06-26

## 2020-06-26 NOTE — TELEPHONE ENCOUNTER
----- Message from Lauren Buitrago MD sent at 6/24/2020  5:02 PM EDT -----  Echo: stable    Above echo results given to patient, 2 pt identifiers used      Future Appointments   Date Time Provider Lorin Pillai   12/8/2020  8:00 AM Kiki Chow  [address] [facility]

## 2020-07-16 RX ORDER — LOSARTAN POTASSIUM 25 MG/1
TABLET ORAL
Qty: 90 TAB | Refills: 3 | Status: SHIPPED | OUTPATIENT
Start: 2020-07-16 | End: 2021-08-04

## 2020-12-08 ENCOUNTER — OFFICE VISIT (OUTPATIENT)
Dept: CARDIOLOGY CLINIC | Age: 75
End: 2020-12-08
Payer: MEDICARE

## 2020-12-08 VITALS
WEIGHT: 164 LBS | HEART RATE: 77 BPM | OXYGEN SATURATION: 97 % | SYSTOLIC BLOOD PRESSURE: 160 MMHG | BODY MASS INDEX: 28 KG/M2 | RESPIRATION RATE: 18 BRPM | HEIGHT: 64 IN | DIASTOLIC BLOOD PRESSURE: 100 MMHG

## 2020-12-08 DIAGNOSIS — I50.21 CHF NYHA CLASS II, ACUTE, SYSTOLIC (HCC): ICD-10-CM

## 2020-12-08 DIAGNOSIS — I21.4 NSTEMI (NON-ST ELEVATED MYOCARDIAL INFARCTION) (HCC): Primary | ICD-10-CM

## 2020-12-08 DIAGNOSIS — I50.21 ACUTE SYSTOLIC HEART FAILURE (HCC): ICD-10-CM

## 2020-12-08 PROCEDURE — 99214 OFFICE O/P EST MOD 30 MIN: CPT | Performed by: INTERNAL MEDICINE

## 2020-12-08 PROCEDURE — 1090F PRES/ABSN URINE INCON ASSESS: CPT | Performed by: INTERNAL MEDICINE

## 2020-12-08 PROCEDURE — G8755 DIAS BP > OR = 90: HCPCS | Performed by: INTERNAL MEDICINE

## 2020-12-08 PROCEDURE — G8419 CALC BMI OUT NRM PARAM NOF/U: HCPCS | Performed by: INTERNAL MEDICINE

## 2020-12-08 PROCEDURE — G8753 SYS BP > OR = 140: HCPCS | Performed by: INTERNAL MEDICINE

## 2020-12-08 PROCEDURE — G8510 SCR DEP NEG, NO PLAN REQD: HCPCS | Performed by: INTERNAL MEDICINE

## 2020-12-08 PROCEDURE — 3017F COLORECTAL CA SCREEN DOC REV: CPT | Performed by: INTERNAL MEDICINE

## 2020-12-08 PROCEDURE — G8427 DOCREV CUR MEDS BY ELIG CLIN: HCPCS | Performed by: INTERNAL MEDICINE

## 2020-12-08 PROCEDURE — G8400 PT W/DXA NO RESULTS DOC: HCPCS | Performed by: INTERNAL MEDICINE

## 2020-12-08 PROCEDURE — 93005 ELECTROCARDIOGRAM TRACING: CPT | Performed by: INTERNAL MEDICINE

## 2020-12-08 PROCEDURE — 93010 ELECTROCARDIOGRAM REPORT: CPT | Performed by: INTERNAL MEDICINE

## 2020-12-08 PROCEDURE — G8536 NO DOC ELDER MAL SCRN: HCPCS | Performed by: INTERNAL MEDICINE

## 2020-12-08 PROCEDURE — 1101F PT FALLS ASSESS-DOCD LE1/YR: CPT | Performed by: INTERNAL MEDICINE

## 2020-12-08 PROCEDURE — G0463 HOSPITAL OUTPT CLINIC VISIT: HCPCS | Performed by: INTERNAL MEDICINE

## 2020-12-08 RX ORDER — CLOPIDOGREL BISULFATE 75 MG/1
75 TABLET ORAL DAILY
Qty: 90 TAB | Refills: 3 | Status: SHIPPED | OUTPATIENT
Start: 2020-12-08 | End: 2022-02-03

## 2020-12-08 NOTE — PROGRESS NOTES
CHIEF COMPLAINT      Dario Stephens is a 77 y.o. female who was seen by synchronous (real-time) audio-video technology on 12/8/2020. Patient is being seen today for followup of her CHF. Loves the garden but hasnt been able to get out there. She has gained a few pounds but is working on it. Just had labs and they were from Dr. Phong Oshea will review. **After her last office visit labs dated 4/19/21 received  CBC ok, BUN 18, Cr 1.2, K 4.2, CMP ok   , TG 80, , HDL 75  A1C 6.3%, Vit D 39     ASSESSMENT   Stopped the rosuvastatin for cost.   Back on the atorvastatin. Reviewed her labs with her. Not doing much activity and getting short of breath with walking up the steps to get the [employer]    BP way up today and that is typical for her but it runs normal at home 110s. PLAN   1.  NSTEMI/CAD - troponin peaked at 2.4, cardiac cath revealed significant RCA disease with collaterals and other non-obstructive CAD, no PCI needed, managing CAD medically  -continue ASA 81mg daily, Toprol XL, statin   3.  Dyslipidemia - , was not able to get the crestor due to insurance but better on the atorvastatin, not to goal will add zetia  4.   HTN - well controlled   5.  DM Type 2 - Hgb A1C 6.0%, management per PCP  6.  Vitamin D deficiency - level 43  7.  LBBB - since 2/18   8.  Hypokalemia - well controlled on KCL 20meq PO daily   9.  Left carotid bruit - vascular followup ongoing, 3/21 Dr. Lala Nassar  10.  Chronic systolic heart failure - LVEF 15-20% by initial TTE 2/18, TTE today showed LVEF 35-40%,  NYHA Class I-II   -continue Toprol XL 25mg daily, losartan 25mg daily and Lasix 40mg daily  -declines AICD, finds the idea extremely stressful and caused tears to discuss it, wants to be a DNR - -we have scanned and she has the original   -she will follow up in the office in 6 months her pref  -did not like entresto and will not retrial.   -Needs carotid ultrasound, seeing Dr. Lala Nassar about once a year.         Fam Hx: + early CAD, father passed from [address] at age 62, mother passed at age 80 after MI with CHF, two brothers with CABG (age unknown)  Soc Hx: no tobacco use, no etoh use     Echo 1/19 - (prelim) LVEF 35-40%  Cardiac Cath 2/18 - Normal LM and LAD, 30% proximal LCX, RCA proximal 80% then mid 100%, fills well distally via left to right collaterals.  LVEF 20%. Echo 2/18 - LV mildly dilated. LVEF 15-20%, severe diffuse hypokinesis, wall thickness was mildly increased, mild to mod MR, mild TR    We discussed the expected course, resolution and complications of the diagnosis(es) in detail. Medication risks, benefits, costs, interactions, and alternatives were discussed as indicated. I advised her to contact the office if her condition worsens, changes or fails to improve as anticipated.  She expressed understanding with the diagnosis(es) and plan    HISTORY OF PRESENTING ILLNESS      Annette Claudio is a 77 y.o. female        [address] LIST     Patient Active Problem List    Diagnosis Date Noted    Carotid stenosis, right 02/01/2019    Internal carotid artery stenosis, right 02/01/2019    Syncope 01/28/2019    Syncope and collapse 01/26/2019    Type 2 diabetes with nephropathy ([address] 05/30/2018    CHF NYHA class II, acute, systolic ([address] 85/92/5803    Coronary artery disease involving native coronary artery of native heart without angina pectoris 04/17/2018    Dyslipidemia, goal LDL below 70 04/17/2018    LBBB (left bundle branch block) 04/17/2018    Diabetes mellitus ([address] 04/17/2018    Hypokalemia 03/01/2018    Pre-diabetes 03/01/2018    HTN (hypertension) 03/01/2018    Hypercholesteremia 33/24/3681    Acute systolic heart failure ([address] 02/27/2018    Acute metabolic encephalopathy 45/80/3999    NSTEMI (non-ST elevated myocardial infarction) ([address] 02/26/2018    Acute respiratory failure with hypoxia ([address] 02/26/2018           PAST MEDICAL HISTORY     Past Medical History:   Diagnosis Date    CAD (coronary artery disease) 02/26/2018    NSTEMI    Congestive [facility] failure ([facility] 02/26/2018    Diabetes ([facility] 02/26/2018    pre-DM    Hypertension            PAST SURGICAL HISTORY     Past Surgical History:   Procedure Laterality Date    HX HEENT Bilateral 05/2012    cataracts          ALLERGIES     Allergies   Allergen Reactions    Adhesive Tape-Silicones Rash          FAMILY HISTORY     Family History   Problem Relation Age of Onset    Heart Disease Mother     Heart Disease Father 62        MI    Diabetes Sister     Heart Disease Brother 71        CABG    Cancer Maternal Grandmother     Pulmonary Embolism Paternal Grandmother     Heart Disease Paternal Grandfather     Heart Disease Brother 59        CABG    Cancer Brother     Liver Disease Brother         HEPATITIS    No Known Problems Daughter     Other Daughter         KIDNEY ISSUES, UNKNOWN NAME    No Known Problems Daughter     Anesth Problems Neg Hx     negative for cardiac disease       SOCIAL HISTORY     Social History     Socioeconomic History    Marital status: SINGLE     Spouse name: Not on file    Number of children: Not on file    Years of education: Not on file    Highest education level: Not on file   Tobacco Use    Smoking status: Never Smoker    Smokeless tobacco: Never Used   Substance and Sexual Activity    Alcohol use: No    Drug use: No         MEDICATIONS     Current Outpatient Medications   Medication Sig    losartan (COZAAR) 25 mg tablet TAKE 1 TABLET BY MOUTH EVERY DAY    furosemide (LASIX) 40 mg tablet TAKE 1 TABLET BY MOUTH EVERY DAY    KLOR-CON M20 20 mEq tablet TAKE 1 TABLET BY MOUTH EVERY DAY    atorvastatin (LIPITOR) 40 mg tablet TAKE 1 TABLET BY MOUTH EVERY DAY    clopidogrel (PLAVIX) 75 mg tab TAKE 1 TABLET BY MOUTH EVERY DAY    aspirin delayed-release 81 mg tablet Take 1 Tab by mouth daily.     metoprolol succinate (TOPROL-XL) 25 mg XL tablet Take 0.5 Tabs by mouth nightly. (Patient taking differently: Take 25 mg by mouth daily.)    DOCOSAHEXANOIC ACID/EPA (FISH OIL PO) Take  by mouth daily.  multivitamin (ONE A DAY) tablet Take 1 Tab by mouth daily. No current facility-administered medications for this visit. I have reviewed the nurses notes, vitals, problem list, allergy list, medical history, family, social history and medications. REVIEW OF SYMPTOMS     Constitutional: Negative for fever, chills, malaise/fatigue and diaphoresis. Respiratory: Negative for cough, hemoptysis, sputum production, shortness of breath and wheezing. Cardiovascular: Negative for chest pain, palpitations, orthopnea, claudication, leg swelling and PND. Gastrointestinal: Negative for heartburn, nausea, vomiting, blood in stool and melena. Genitourinary: Negative for dysuria and flank pain. Musculoskeletal: Negative for joint pain and back pain. Skin: Negative for rash. Neurological: Negative for focal weakness, seizures, loss of consciousness, weakness and headaches. Endo/Heme/Allergies: Negative for abnormal bleeding. Psychiatric/Behavioral: Negative for memory loss. PHYSICAL EXAMINATION        General: Well developed, in no acute distress, cooperative and alert  HEENT: Pupils equal/round. No marked JVD  +left carotid bruit  Respiratory: No audible wheezing, no signs of respiratory distress, lips non cyanotic  Extremities:  No edema  Neuro: A&Ox3, speech clear, no facial droop, answering questions appropriately  Skin: Skin color is normal. No rashes or lesions. Non diaphoretic on visible skin during exam  cv 2/6 kristel rusb     DIAGNOSTIC DATA      Cardiac Cath 2/18 - Normal LM and LAD, 30% proximal LCX, RCA proximal 80% then mid 100%, fills well distally via left to right collaterals. LVEF 20%. Echo 2/18 - LV mildly dilated.  LVEF 15-20%, severe diffuse hypokinesis, wall thickness was mildly increased, mild to mod MR, mild TR       LABORATORY DATA      Lab Results   Component Value Date/Time    WBC 9.7 02/02/2019 03:24 AM    Hemoglobin (POC) 12.6 02/26/2018 01:50 AM    HGB 9.0 (L) 02/02/2019 03:24 AM    Hematocrit (POC) 37 02/26/2018 01:50 AM    HCT 28.3 (L) 02/02/2019 03:24 AM    PLATELET 787 29/67/9509 03:24 AM    MCV 98.6 02/02/2019 03:24 AM      Lab Results   Component Value Date/Time    Sodium 141 02/02/2019 03:24 AM    Potassium 4.0 02/02/2019 03:24 AM    Chloride 107 02/02/2019 03:24 AM    CO2 25 02/02/2019 03:24 AM    Anion gap 9 02/02/2019 03:24 AM    Glucose 127 (H) 02/02/2019 03:24 AM    BUN 18 02/02/2019 03:24 AM    Creatinine 1.13 (H) 02/02/2019 03:24 AM    BUN/Creatinine ratio 16 02/02/2019 03:24 AM    GFR est AA 57 (L) 02/02/2019 03:24 AM    GFR est non-AA 47 (L) 02/02/2019 03:24 AM    Calcium 8.5 02/02/2019 03:24 AM    Bilirubin, total 0.5 01/27/2019 02:27 AM    Alk. phosphatase 80 01/27/2019 02:27 AM    Protein, total 8.0 01/27/2019 02:27 AM    Albumin 3.6 01/27/2019 02:27 AM    Globulin 4.4 (H) 01/27/2019 02:27 AM    A-G Ratio 0.8 (L) 01/27/2019 02:27 AM    ALT (SGPT) 19 01/27/2019 02:27 AM             FOLLOW-UP          Thank you, Aziza Ivy MD for allowing me to participate in the care of Jaye Diallo. Please do not hesitate to contact me for further questions/concerns.          MD Dangelo Baird 647 [id number] [facility], [address] [place] [address]  Karla Makjarrell        [phone] / [phone] Fax       [facility], [address] [facility],[address]  Luciano Duncan  [phone] / [phone] Fax

## 2021-01-28 RX ORDER — ATORVASTATIN CALCIUM 40 MG/1
TABLET, FILM COATED ORAL
Qty: 90 TAB | Refills: 3 | Status: SHIPPED | OUTPATIENT
Start: 2021-01-28 | End: 2021-12-01 | Stop reason: SDUPTHER

## 2021-01-28 RX ORDER — ASPIRIN 81 MG/1
TABLET ORAL
Qty: 90 TAB | Refills: 3 | Status: SHIPPED | OUTPATIENT
Start: 2021-01-28 | End: 2022-02-25 | Stop reason: SDUPTHER

## 2021-03-09 ENCOUNTER — DOCUMENTATION ONLY (OUTPATIENT)
Dept: CARDIOLOGY CLINIC | Age: 76
End: 2021-03-09

## 2021-03-09 DIAGNOSIS — E87.6 HYPOKALEMIA: Primary | ICD-10-CM

## 2021-03-09 RX ORDER — POTASSIUM CHLORIDE 1500 MG/1
TABLET, EXTENDED RELEASE ORAL
Qty: 90 TAB | Refills: 0 | Status: SHIPPED | OUTPATIENT
Start: 2021-03-09 | End: 2021-06-03

## 2021-03-10 NOTE — TELEPHONE ENCOUNTER
LVM for patient to return call at earliest convenience. Per NP Rika:    Refilled KCL today, please ask her to get BMP checked in the next few weeks    Labs ordered. Lab slip mailed to patient. Future Appointments   Date Time Provider Lorin Pillai   6/15/2021  9:00 AM REMINGTON LEE   6/15/2021 10:00 AM MD BILL Alvarez     Patient returned call, 2 pt identifiers used    She states she did have labs with PCP last year. But she states she is seeing Dr. Oniel Thomson again in April of this year and will have repeat labs. She will request lab results faxed to Dr. Jerilyn Willson.

## 2021-06-01 RX ORDER — FUROSEMIDE 40 MG/1
TABLET ORAL
Qty: 90 TABLET | Refills: 3 | Status: SHIPPED | OUTPATIENT
Start: 2021-06-01 | End: 2022-06-07

## 2021-06-03 NOTE — ANESTHESIA POSTPROCEDURE EVALUATION
Post-Anesthesia Evaluation and Assessment Patient: Klever Ochoa MRN: [id number]  SSN: [id number] YOB: 1945  Age: 68 y.o. Sex: female Cardiovascular Function/Vital Signs Visit Vitals /49 Pulse 62 Temp 36.7 °C (98.1 °F) Resp 16 Ht 5' 4\" (1.626 m) Wt 65.2 kg (143 lb 12.8 oz) SpO2 99% BMI 24.68 kg/m² Patient is status post General anesthesia for Procedure(s): RIGHT CAROTID ARTERY ENDARTERECTOMY WITH PATCH. Nausea/Vomiting: None Postoperative hydration reviewed and adequate. Pain: 
Pain Scale 1: Numeric (0 - 10) (02/01/19 1550) Pain Intensity 1: 0 (02/01/19 1550) Managed Neurological Status:  
Neuro (WDL): Within Defined Limits (02/01/19 1843) Neuro Neurologic State: Alert (02/01/19 1500) Orientation Level: Oriented X4 (02/01/19 1500) Cognition: Appropriate decision making; Appropriate for age attention/concentration; Appropriate safety awareness (02/01/19 1500) Speech: Clear (02/01/19 1500) Assessment L Pupil: Brisk (02/01/19 1500) Size L Pupil (mm): 3 (02/01/19 1500) Assessment R Pupil: Brisk (02/01/19 1500) Size R Pupil (mm): 3 (02/01/19 1500) LUE Motor Response: Purposeful (02/01/19 1500) LLE Motor Response: Purposeful (02/01/19 1500) RUE Motor Response: Purposeful (02/01/19 1500) RLE Motor Response: Purposeful (02/01/19 1500) At baseline Mental Status and Level of Consciousness: Alert and oriented to person, place, and time Pulmonary Status:  
O2 Device: Nasal cannula (02/01/19 1523) Adequate oxygenation and airway patent Complications related to anesthesia: None Post-anesthesia assessment completed. No concerns Signed By: Temitope Singh MD   
 February 1, 2019 Procedure(s): RIGHT CAROTID ARTERY ENDARTERECTOMY WITH PATCH. 
 
<BSHSIANPOST> Visit Vitals /49 Pulse 62 Temp 36.7 °C (98.1 °F) Resp 16 Ht 5' 4\" (1.626 m) Wt 65.2 kg (143 lb 12.8 oz) SpO2 99% BMI 24.68 kg/m²
Take over the counter pain medication/Prescriptions electronically submitted to pharmacy from [facility]

## 2021-06-15 ENCOUNTER — ANCILLARY PROCEDURE (OUTPATIENT)
Dept: CARDIOLOGY CLINIC | Age: 76
End: 2021-06-15
Payer: MEDICARE

## 2021-06-15 ENCOUNTER — OFFICE VISIT (OUTPATIENT)
Dept: CARDIOLOGY CLINIC | Age: 76
End: 2021-06-15
Payer: MEDICARE

## 2021-06-15 VITALS — HEIGHT: 64 IN | BODY MASS INDEX: 28.85 KG/M2 | WEIGHT: 169 LBS

## 2021-06-15 VITALS
DIASTOLIC BLOOD PRESSURE: 80 MMHG | OXYGEN SATURATION: 98 % | BODY MASS INDEX: 28.85 KG/M2 | WEIGHT: 169 LBS | HEART RATE: 76 BPM | SYSTOLIC BLOOD PRESSURE: 140 MMHG | RESPIRATION RATE: 13 BRPM | HEIGHT: 64 IN

## 2021-06-15 DIAGNOSIS — I21.4 NSTEMI (NON-ST ELEVATED MYOCARDIAL INFARCTION) (HCC): ICD-10-CM

## 2021-06-15 DIAGNOSIS — I50.21 ACUTE SYSTOLIC HEART FAILURE (HCC): ICD-10-CM

## 2021-06-15 DIAGNOSIS — I50.21 CHF NYHA CLASS II, ACUTE, SYSTOLIC (HCC): ICD-10-CM

## 2021-06-15 DIAGNOSIS — I73.9 PAD (PERIPHERAL ARTERY DISEASE) (HCC): ICD-10-CM

## 2021-06-15 DIAGNOSIS — I21.4 NSTEMI (NON-ST ELEVATED MYOCARDIAL INFARCTION) (HCC): Primary | ICD-10-CM

## 2021-06-15 DIAGNOSIS — E78.5 DYSLIPIDEMIA, GOAL LDL BELOW 70: ICD-10-CM

## 2021-06-15 DIAGNOSIS — I25.10 CORONARY ARTERY DISEASE INVOLVING NATIVE HEART WITHOUT ANGINA PECTORIS, UNSPECIFIED VESSEL OR LESION TYPE: ICD-10-CM

## 2021-06-15 DIAGNOSIS — E87.6 HYPOKALEMIA: ICD-10-CM

## 2021-06-15 DIAGNOSIS — I50.22 CHRONIC SYSTOLIC CONGESTIVE HEART FAILURE (HCC): ICD-10-CM

## 2021-06-15 LAB
AV R PG: 64.18 MMHG
ECHO AO ASC DIAM: 4 CM
ECHO AO ROOT DIAM: 2.98 CM
ECHO AR MAX VEL PISA: 400.25 CM/S
ECHO AV AREA PEAK VELOCITY: 1.43 CM2
ECHO AV AREA VTI: 1.74 CM2
ECHO AV AREA/BSA PEAK VELOCITY: 0.8 CM2/M2
ECHO AV AREA/BSA VTI: 1 CM2/M2
ECHO AV MEAN GRADIENT: 9.13 MMHG
ECHO AV PEAK GRADIENT: 15.49 MMHG
ECHO AV PEAK VELOCITY: 196.81 CM/S
ECHO AV REGURGITANT PHT: 491 MS
ECHO AV VTI: 38.16 CM
ECHO IVC PROX: 1.41 CM
ECHO LA AREA 4C: 20.14 CM2
ECHO LA MAJOR AXIS: 4.38 CM
ECHO LA MINOR AXIS: 2.41 CM
ECHO LA VOL 2C: 58.48 ML (ref 22–52)
ECHO LA VOL 4C: 60.71 ML (ref 22–52)
ECHO LA VOL BP: 66.64 ML (ref 22–52)
ECHO LA VOL/BSA BIPLANE: 36.62 ML/M2 (ref 16–28)
ECHO LA VOLUME INDEX A2C: 32.13 ML/M2 (ref 16–28)
ECHO LA VOLUME INDEX A4C: 33.36 ML/M2 (ref 16–28)
ECHO LV E' LATERAL VELOCITY: 9.47 CM/S
ECHO LV E' SEPTAL VELOCITY: 8.2 CM/S
ECHO LV EDV A2C: 74.37 ML
ECHO LV EDV A4C: 93.72 ML
ECHO LV EDV BP: 86.19 ML (ref 56–104)
ECHO LV EDV INDEX A4C: 51.5 ML/M2
ECHO LV EDV INDEX BP: 47.4 ML/M2
ECHO LV EDV NDEX A2C: 40.9 ML/M2
ECHO LV EJECTION FRACTION A2C: 40 PERCENT
ECHO LV EJECTION FRACTION A4C: 21 PERCENT
ECHO LV EJECTION FRACTION BIPLANE: 31 PERCENT (ref 55–100)
ECHO LV ESV A2C: 44.5 ML
ECHO LV ESV A4C: 74.41 ML
ECHO LV ESV BP: 59.45 ML (ref 19–49)
ECHO LV ESV INDEX A2C: 24.5 ML/M2
ECHO LV ESV INDEX A4C: 40.9 ML/M2
ECHO LV ESV INDEX BP: 32.7 ML/M2
ECHO LV INTERNAL DIMENSION DIASTOLIC: 4.71 CM (ref 3.9–5.3)
ECHO LV INTERNAL DIMENSION SYSTOLIC: 3.99 CM
ECHO LV IVSD: 1.18 CM (ref 0.6–0.9)
ECHO LV MASS 2D: 214 G (ref 67–162)
ECHO LV MASS INDEX 2D: 117.6 G/M2 (ref 43–95)
ECHO LV POSTERIOR WALL DIASTOLIC: 1.23 CM (ref 0.6–0.9)
ECHO LVOT DIAM: 2.38 CM
ECHO LVOT PEAK GRADIENT: 1.58 MMHG
ECHO LVOT PEAK VELOCITY: 62.88 CM/S
ECHO LVOT SV: 66.4 ML
ECHO LVOT VTI: 14.89 CM
ECHO MV A VELOCITY: 102.27 CM/S
ECHO MV E DECELERATION TIME (DT): 257.57 MS
ECHO MV E VELOCITY: 60.93 CM/S
ECHO MV E/A RATIO: 0.6
ECHO MV E/E' LATERAL: 6.43
ECHO MV E/E' RATIO (AVERAGED): 6.93
ECHO MV E/E' SEPTAL: 7.43
ECHO PV MAX VELOCITY: 95.54 CM/S
ECHO PV PEAK INSTANTANEOUS GRADIENT SYSTOLIC: 3.65 MMHG
ECHO RA MINOR AXIS: 3.86 CM
ECHO RV INTERNAL DIMENSION: 4 CM
ECHO TV REGURGITANT MAX VELOCITY: 290.54 CM/S
ECHO TV REGURGITANT PEAK GRADIENT: 33.76 MMHG
LA VOL DISK BP: 63.11 ML (ref 22–52)

## 2021-06-15 PROCEDURE — G0463 HOSPITAL OUTPT CLINIC VISIT: HCPCS | Performed by: INTERNAL MEDICINE

## 2021-06-15 PROCEDURE — 93306 TTE W/DOPPLER COMPLETE: CPT | Performed by: INTERNAL MEDICINE

## 2021-06-15 PROCEDURE — G8753 SYS BP > OR = 140: HCPCS | Performed by: INTERNAL MEDICINE

## 2021-06-15 PROCEDURE — G8400 PT W/DXA NO RESULTS DOC: HCPCS | Performed by: INTERNAL MEDICINE

## 2021-06-15 PROCEDURE — 99214 OFFICE O/P EST MOD 30 MIN: CPT | Performed by: INTERNAL MEDICINE

## 2021-06-15 PROCEDURE — G8427 DOCREV CUR MEDS BY ELIG CLIN: HCPCS | Performed by: INTERNAL MEDICINE

## 2021-06-15 PROCEDURE — 1101F PT FALLS ASSESS-DOCD LE1/YR: CPT | Performed by: INTERNAL MEDICINE

## 2021-06-15 PROCEDURE — G8536 NO DOC ELDER MAL SCRN: HCPCS | Performed by: INTERNAL MEDICINE

## 2021-06-15 PROCEDURE — G8419 CALC BMI OUT NRM PARAM NOF/U: HCPCS | Performed by: INTERNAL MEDICINE

## 2021-06-15 PROCEDURE — G8754 DIAS BP LESS 90: HCPCS | Performed by: INTERNAL MEDICINE

## 2021-06-15 PROCEDURE — G8510 SCR DEP NEG, NO PLAN REQD: HCPCS | Performed by: INTERNAL MEDICINE

## 2021-06-15 PROCEDURE — 1090F PRES/ABSN URINE INCON ASSESS: CPT | Performed by: INTERNAL MEDICINE

## 2021-06-15 PROCEDURE — 3017F COLORECTAL CA SCREEN DOC REV: CPT | Performed by: INTERNAL MEDICINE

## 2021-06-15 NOTE — PROGRESS NOTES
CHIEF COMPLAINT      Rajni Nunez is a 77 y.o. female who was seen by synchronous (real-time) audio-video technology on 6/15/2021. Patient is being seen today for followup of her CHF. She wakes up in the night but not with any PND or orthopnea. Generally feeling well sleeping well. . No edema. Staying active. The garden will fill her whole whole day. So overall she is doing great her ejection fraction remains low but she is able to do everything she wants to do including gardening for many hours over the course of the day. Her energy level is good she is not having any shortness of breath swelling or chest pain to indicate things are progressing from a coronary disease standpoint. Her cholesterol is uncontrolled she prefers not to take additional medication for that we have talked about it at length. Her heart failure is borderline but stable and she does not need or want an ICD at this point. She will continue to follow-up with yearly echoes in 6-month heart failure appointments    Loves the garden but hasnt been able to get out there. She has gained a few pounds but is working on it. Just had labs and they were from Dr. Rojelio Gonzalez will review. **labs dated 4/19/21 received  CBC ok, BUN 18, Cr 1.2, K 4.2, CMP ok   , TG 80, , HDL 75  A1C 6.3%, Vit D 39     ASSESSMENT   Stopped the rosuvastatin for cost.   Back on the atorvastatin. Reviewed her labs with her. Not doing much activity and getting short of breath with walking up the steps to get the [employer]    BP  up today and that is typical for her but it runs normal at home 110s.     PLAN   1.  NSTEMI/CAD - troponin peaked at 2.4, cardiac cath revealed significant RCA disease with collaterals and other non-obstructive CAD, no PCI needed, managing CAD medically  -continue ASA 81mg daily, Toprol XL, statin   3.  Dyslipidemia - , was not able to get the crestor due to insurance but better on the atorvastatin, could not get zetia  4.  HTN - well controlled   5.  DM Type 2 - Hgb A1C 6.0%, management per PCP  6.  Vitamin D deficiency - level 43  7.  LBBB - since 2/18   8.  Hypokalemia - well controlled on KCL 20meq PO daily   9.  Left carotid bruit - vascular followup ongoing, 3/21 Dr. Hanna Correa  10.  Chronic systolic heart failure - LVEF 15-20% by initial TTE 2/18, TTE today showed LVEF 35-40%,  NYHA Class I-II   -continue Toprol XL 25mg daily, losartan 25mg daily and Lasix 40mg daily  -declines AICD, finds the idea extremely stressful and caused tears to discuss it, wants to be a DNR - -we have scanned and she has the original   -she will follow up in the office in 6 months her pref  -did not like entresto and will not retrial.   -Needs carotid ultrasound, seeing Dr. Hanna Correa about once a year.      Fam Hx: + early CAD, father passed from [address] [facility] at age 62, mother passed at age 80 after MI with CHF, two brothers with CABG (age unknown)  Soc Hx: no tobacco use, no etoh use     Echo 1/19 - (prelim) LVEF 35-40%  Cardiac Cath 2/18 - Normal LM and LAD, 30% proximal LCX, RCA proximal 80% then mid 100%, fills well distally via left to right collaterals.  LVEF 20%. Echo 2/18 - LV mildly dilated. LVEF 15-20%, severe diffuse hypokinesis, wall thickness was mildly increased, mild to mod MR, mild TR    We discussed the expected course, resolution and complications of the diagnosis(es) in detail. Medication risks, benefits, costs, interactions, and alternatives were discussed as indicated. I advised her to contact the office if her condition worsens, changes or fails to improve as anticipated.  She expressed understanding with the diagnosis(es) and plan    HISTORY OF PRESENTING ILLNESS      Akilah Mayfield is a 77 y.o. female        ACTIVE PROBLEM LIST     Patient Active Problem List    Diagnosis Date Noted    Carotid stenosis, right 02/01/2019    Internal carotid artery stenosis, right 02/01/2019    Syncope 01/28/2019    Syncope and collapse 01/26/2019    Type 2 diabetes with nephropathy ([address] 05/30/2018    CHF NYHA class II, acute, systolic ([address] 89/97/3892    Coronary artery disease involving native coronary artery of native heart without angina pectoris 04/17/2018    Dyslipidemia, goal LDL below 70 04/17/2018    LBBB (left bundle branch block) 04/17/2018    Diabetes mellitus ([address] 04/17/2018    Hypokalemia 03/01/2018    Pre-diabetes 03/01/2018    HTN (hypertension) 03/01/2018    Hypercholesteremia 78/30/9712    Acute systolic heart failure ([address] 02/27/2018    Acute metabolic encephalopathy 11/62/8892    NSTEMI (non-ST elevated myocardial infarction) ([address] 02/26/2018    Acute respiratory failure with hypoxia ([address] 02/26/2018           PAST MEDICAL HISTORY     Past Medical History:   Diagnosis Date    CAD (coronary artery disease) 02/26/2018    NSTEMI    Congestive heart failure ([address] 02/26/2018    Diabetes ([address] 02/26/2018    pre-DM    Hypertension            PAST SURGICAL HISTORY     Past Surgical History:   Procedure Laterality Date    HX HEENT Bilateral 05/2012    cataracts          ALLERGIES     Allergies   Allergen Reactions    Adhesive Tape-Silicones Rash          FAMILY HISTORY     Family History   Problem Relation Age of Onset    Heart Disease Mother     Heart Disease Father 62        MI    Diabetes Sister     Heart Disease Brother 71        CABG    Cancer Maternal Grandmother     Pulmonary Embolism Paternal Grandmother     Heart Disease Paternal Grandfather     Heart Disease Brother 59        CABG    Cancer Brother     Liver Disease Brother         HEPATITIS    No Known Problems Daughter     Other Daughter         KIDNEY ISSUES, UNKNOWN NAME    No Known Problems Daughter     Anesth Problems Neg Hx     negative for cardiac disease       SOCIAL HISTORY     Social History     Socioeconomic History    Marital status: SINGLE     Spouse name: Not on file    Number of children: Not on file    Years of education: Not on file    Highest education level: Not on file   Tobacco Use    Smoking status: Never Smoker    Smokeless tobacco: Never Used   Substance and Sexual Activity    Alcohol use: No    Drug use: No     Social Determinants of Health     Financial Resource Strain:     Difficulty of Paying Living Expenses:    Food Insecurity:     Worried About Running Out of Food in the Last Year:     [address] in the Last Year:    Transportation Needs:     Lack of Transportation (Medical):  Lack of Transportation (Non-Medical):    Physical Activity:     Days of Exercise per Week:     Minutes of Exercise per Session:    Stress:     Feeling of Stress :    Social Connections:     Frequency of Communication with Friends and Family:     Frequency of Social Gatherings with Friends and Family:     Attends Jewish Services:     Active Member of Clubs or Organizations:     Attends Club or Organization Meetings:     Marital Status:          MEDICATIONS     Current Outpatient Medications   Medication Sig    Klor-Con M20 20 mEq tablet TAKE 1 TABLET BY MOUTH EVERY DAY    furosemide (LASIX) 40 mg tablet TAKE 1 TABLET BY MOUTH EVERY DAY    aspirin delayed-release 81 mg tablet TAKE 1 TABLET BY MOUTH EVERY DAY    atorvastatin (LIPITOR) 40 mg tablet TAKE 1 TABLET BY MOUTH EVERY DAY    clopidogreL (PLAVIX) 75 mg tab Take 1 Tab by mouth daily.  losartan (COZAAR) 25 mg tablet TAKE 1 TABLET BY MOUTH EVERY DAY    metoprolol succinate (TOPROL-XL) 25 mg XL tablet Take 0.5 Tabs by mouth nightly. (Patient taking differently: Take 25 mg by mouth daily.)    DOCOSAHEXANOIC ACID/EPA (FISH OIL PO) Take  by mouth daily.  multivitamin (ONE A DAY) tablet Take 1 Tab by mouth daily. No current facility-administered medications for this visit. I have reviewed the nurses notes, vitals, problem list, allergy list, medical history, family, social history and medications.        REVIEW OF SYMPTOMS Constitutional: Negative for fever, chills, malaise/fatigue and diaphoresis. Respiratory: Negative for cough, hemoptysis, sputum production, shortness of breath and wheezing. Cardiovascular: Negative for chest pain, palpitations, orthopnea, claudication, leg swelling and PND. Gastrointestinal: Negative for heartburn, nausea, vomiting, blood in stool and melena. Genitourinary: Negative for dysuria and flank pain. Musculoskeletal: Negative for joint pain and back pain. Skin: Negative for rash. Neurological: Negative for focal weakness, seizures, loss of consciousness, weakness and headaches. Endo/Heme/Allergies: Negative for abnormal bleeding. Psychiatric/Behavioral: Negative for memory loss. PHYSICAL EXAMINATION        General: Well developed, in no acute distress, cooperative and alert  HEENT: Pupils equal/round. No marked JVD  +left carotid bruit  Respiratory: No audible wheezing, no signs of respiratory distress, lips non cyanotic  Extremities:  No edema  Neuro: A&Ox3, speech clear, no facial droop, answering questions appropriately  Skin: Skin color is normal. No rashes or lesions. Non diaphoretic on visible skin during exam  cv 2/6 kristel rusb     DIAGNOSTIC DATA      Cardiac Cath 2/18 - Normal LM and LAD, 30% proximal LCX, RCA proximal 80% then mid 100%, fills well distally via left to right collaterals. LVEF 20%. Echo 2/18 - LV mildly dilated.  LVEF 15-20%, severe diffuse hypokinesis, wall thickness was mildly increased, mild to mod MR, mild TR       LABORATORY DATA      Lab Results   Component Value Date/Time    WBC 9.7 02/02/2019 03:24 AM    Hemoglobin (POC) 12.6 02/26/2018 01:50 AM    HGB 9.0 (L) 02/02/2019 03:24 AM    Hematocrit (POC) 37 02/26/2018 01:50 AM    HCT 28.3 (L) 02/02/2019 03:24 AM    PLATELET 629 02/82/1596 03:24 AM    MCV 98.6 02/02/2019 03:24 AM      Lab Results   Component Value Date/Time    Sodium 141 02/02/2019 03:24 AM    Potassium 4.0 02/02/2019 03:24 AM    Chloride 107 02/02/2019 03:24 AM    CO2 25 02/02/2019 03:24 AM    Anion gap 9 02/02/2019 03:24 AM    Glucose 127 (H) 02/02/2019 03:24 AM    BUN 18 02/02/2019 03:24 AM    Creatinine 1.13 (H) 02/02/2019 03:24 AM    BUN/Creatinine ratio 16 02/02/2019 03:24 AM    GFR est AA 57 (L) 02/02/2019 03:24 AM    GFR est non-AA 47 (L) 02/02/2019 03:24 AM    Calcium 8.5 02/02/2019 03:24 AM    Bilirubin, total 0.5 01/27/2019 02:27 AM    Alk. phosphatase 80 01/27/2019 02:27 AM    Protein, total 8.0 01/27/2019 02:27 AM    Albumin 3.6 01/27/2019 02:27 AM    Globulin 4.4 (H) 01/27/2019 02:27 AM    A-G Ratio 0.8 (L) 01/27/2019 02:27 AM    ALT (SGPT) 19 01/27/2019 02:27 AM             FOLLOW-UP          Thank you, Drew Schmidt MD for allowing me to participate in the care of Tessa Alejandra. Please do not hesitate to contact me for further questions/concerns.          Estefany Gibbs MD    [address] [facility]        [phone] / [phone] Fax       [facility], [address] [facility] [address]  Luciano Duncan  [phone] / [phone] Fax

## 2021-08-03 PROBLEM — R55 SYNCOPE: Status: RESOLVED | Noted: 2019-01-28 | Resolved: 2021-08-03

## 2021-10-21 LAB — HBA1C MFR BLD HPLC: 6.3 %

## 2021-10-22 RX ORDER — LOSARTAN POTASSIUM 25 MG/1
TABLET ORAL
Qty: 90 TABLET | Refills: 0 | Status: SHIPPED | OUTPATIENT
Start: 2021-10-22 | End: 2022-01-14

## 2021-11-09 ENCOUNTER — TELEPHONE (OUTPATIENT)
Dept: CARDIOLOGY CLINIC | Age: 76
End: 2021-11-09

## 2021-11-09 NOTE — TELEPHONE ENCOUNTER
11/9/2021 at 8:57 left message call to reschedule 12/15/2021 appointment with Aliyah Singh., NP - she'll be out of the office

## 2021-11-11 NOTE — TELEPHONE ENCOUNTER
Patient returned my call appointment has been rescheduled as follows:     Future Appointments   Date Time Provider Lorin Pillai   12/1/2021 11:00 AM Danny Person NP [facility]   6/16/2022  8:00 AM [facility] AMB   6/16/2022  1:00 PM Danny Person NP [facility]

## 2021-11-30 NOTE — PROGRESS NOTES
CHIEF COMPLAINT       Patient is being seen today for followup of her CHF. HISTORY OF PRESENT ILLNESS      She is feeling well   No chest pain or palpitations  No dizziness or lightheadedness  Has her baseline PALACIO but no PND  No LE edema  Reviewed home BP readings - BP is very well controlled, has elevations in BP when she goes to MD appts  She is staying active in her garden, has to take breaks every now and then for fatigue   She is able to do everything she wants to do, her energy level is good   She mentioned not wanting an ICD again and I told her I wasn't going to force it on her   She also mentioned that she is a DNR and is at peace with whatever happens to her  She can't wait to see her  again in [facility]  She will continue to follow-up with yearly echoes and 6-month heart failure appointments    **labs reviewed dated 10/21/21  CBC ok, BUN 21, Cr 1.21, K 4.1, LFTs ok  , HDL 79, TG 62   A1C 6.3%    **After her last office visit labs received dated April 19, 2022  CBC okay, BUN 14, creatinine 1.2, sodium 139, potassium 4.4, LFTs normal   , TG 86, HDL 73, , A1c 6.5%    **Likes for KAMLESH to do her echocardiograms  **Admits to being very modest, likes shades closed for ECGs, willing to have an ECG at next visit with her modesty kept in mind      ASSESSMENT & PLAN     1.  NSTEMI/CAD - troponin peaked at 2.4, cardiac cath revealed significant RCA disease with collaterals and other non-obstructive CAD, no PCI needed, managing CAD medically  -continue ASA 81mg daily, Toprol XL, statin   3.  Dyslipidemia - , advised her to increase her atorvastatin to 80mg daily, LDL goal < 70, will check lipids at her next appointment  -unable to afford rosuvastatin in the past, could not get zetia  4.  HTN - well controlled on home readings  5.  DM Type 2 - management per PCP  6.  LBBB - since 2/18   7. Hypokalemia - well controlled on KCL 20meq PO daily   8.   PAD -followed by vascular surgery/Dr. Costa Martin, will see him again in March 2022    9.  Chronic systolic heart failure - LVEF 15-20% by initial TTE 2/18, last TTE showed LVEF 35-40%,  NYHA Class I-II   -continue Toprol XL 25mg daily, losartan 25mg daily and Lasix 40mg daily  -did not like entresto and will not retrial.   -declined AICD, found the idea extremely stressful and even caused tears discussing it, DNR in place  -she will follow up in the office in 6 months with a same day echo to reassess her EF      Fam Hx: + early CAD, father passed from MI at age 62, mother passed at age 80 after MI with CHF, two brothers with CABG (age unknown)  Soc Hx: no tobacco use, no etoh use     Echo 6/21 - EF 35-40%, mod AV sclerosis, mild to mod MR, ascend Ao 4 cm  Echo 1/19 - (prelim) LVEF 35-40%  Cardiac Cath 2/18 - Normal LM and LAD, 30% proximal LCX, RCA proximal 80% then mid 100%, fills well distally via left to right collaterals.  LVEF 20%. Echo 2/18 - LV mildly dilated.  LVEF 15-20%, severe diffuse hypokinesis, wall thickness was mildly increased, mild to mod MR, mild TR     ACTIVE PROBLEM LIST     Patient Active Problem List    Diagnosis Date Noted    Carotid stenosis, right 02/01/2019    Internal carotid artery stenosis, right 02/01/2019    Syncope and collapse 01/26/2019    Type 2 diabetes with nephropathy ([address] 05/30/2018    CHF NYHA class II, acute, systolic ([address] 42/34/4475    Coronary artery disease involving native coronary artery of native heart without angina pectoris 04/17/2018    Dyslipidemia, goal LDL below 70 04/17/2018    LBBB (left bundle branch block) 04/17/2018    Diabetes mellitus ([address] 04/17/2018    Hypokalemia 03/01/2018    Pre-diabetes 03/01/2018    HTN (hypertension) 03/01/2018    Hypercholesteremia 49/45/1808    Acute systolic heart failure ([address] 02/27/2018    Acute metabolic encephalopathy 66/40/0589    NSTEMI (non-ST elevated myocardial infarction) ([address] 02/26/2018    [facility] hypoxia [facility] 02/26/2018           PAST MEDICAL HISTORY     Past Medical History:   Diagnosis Date    CAD (coronary artery disease) 02/26/2018    NSTEMI    Congestive heart failure [facility] 02/26/2018    Diabetes ([facility] 02/26/2018    pre-DM    Hypertension            PAST SURGICAL HISTORY     Past Surgical History:   Procedure Laterality Date    HX HEENT Bilateral 05/2012    cataracts          ALLERGIES     Allergies   Allergen Reactions    Adhesive Tape-Silicones Rash          FAMILY HISTORY     Family History   Problem Relation Age of Onset    Heart Disease Mother     Heart Disease Father 62        MI    Diabetes Sister     Heart Disease Brother 71        CABG    Cancer Maternal Grandmother     Pulmonary Embolism Paternal Grandmother     Heart Disease Paternal Grandfather     Heart Disease Brother 59        CABG    Cancer Brother     Liver Disease Brother         HEPATITIS    No Known Problems Daughter     Other Daughter         KIDNEY ISSUES, UNKNOWN NAME    No Known Problems Daughter     Anesth Problems Neg Hx     negative for cardiac disease       SOCIAL HISTORY     Social History     Socioeconomic History    Marital status: SINGLE   Tobacco Use    Smoking status: Never Smoker    Smokeless tobacco: Never Used   Substance and Sexual Activity    Alcohol use: No    Drug use: No         MEDICATIONS     Current Outpatient Medications   Medication Sig    losartan (COZAAR) 25 mg tablet TAKE 1 TABLET BY MOUTH EVERY DAY    Klor-Con M20 20 mEq tablet TAKE 1 TABLET BY MOUTH EVERY DAY    furosemide (LASIX) 40 mg tablet TAKE 1 TABLET BY MOUTH EVERY DAY    aspirin delayed-release 81 mg tablet TAKE 1 TABLET BY MOUTH EVERY DAY    atorvastatin (LIPITOR) 40 mg tablet TAKE 1 TABLET BY MOUTH EVERY DAY    clopidogreL (PLAVIX) 75 mg tab Take 1 Tab by mouth daily.  metoprolol succinate (TOPROL-XL) 25 mg XL tablet Take 0.5 Tabs by mouth nightly.  (Patient taking differently: Take 25 mg by mouth daily.)    DOCOSAHEXANOIC ACID/EPA (FISH OIL PO) Take  by mouth daily.  multivitamin (ONE A DAY) tablet Take 1 Tab by mouth daily. No current facility-administered medications for this visit. I have reviewed the nurses notes, vitals, problem list, allergy list, medical history, family, social history and medications. REVIEW OF SYMPTOMS     Constitutional: Negative for fever, chills, malaise/fatigue and diaphoresis. Respiratory: Negative for cough, hemoptysis, sputum production, and wheezing. Cardiovascular: Negative for chest pain, palpitations, orthopnea, claudication, leg swelling and PND. Gastrointestinal: Negative for heartburn, nausea, vomiting, blood in stool and melena. Genitourinary: Negative for dysuria and flank pain. Musculoskeletal: Negative for joint pain and back pain. Skin: Negative for rash. Neurological: Negative for focal weakness, seizures, loss of consciousness, weakness and headaches. Endo/Heme/Allergies: Negative for abnormal bleeding. Psychiatric/Behavioral: Negative for memory loss.       PHYSICAL EXAMINATION       General appearance - alert, well appearing, and in no distress  Mental status - affect appropriate to mood  Eyes - sclera anicteric, moist mucous membranes  Neck - supple  Lymphatics - not assessed  Chest - clear to auscultation, no wheezes, rales or rhonchi  Heart - normal rate, regular rhythm, normal S1, S2, 2/6 kristel   Abdomen - soft, nontender, nondistended  Back exam - full range of motion, no tenderness  Neurological - cranial nerves II through XII grossly intact, no focal deficit  Musculoskeletal - no muscular tenderness noted, normal strength  Extremities - peripheral pulses normal, no pedal edema  Skin - normal coloration  no rashes         LABORATORY DATA      Lab Results   Component Value Date/Time    WBC 9.7 02/02/2019 03:24 AM    Hemoglobin (POC) 12.6 02/26/2018 01:50 AM    HGB 9.0 (L) 02/02/2019 03:24 AM    Hematocrit (POC) 37 02/26/2018 01:50 AM HCT 28.3 (L) 02/02/2019 03:24 AM    PLATELET 556 40/15/9330 03:24 AM    MCV 98.6 02/02/2019 03:24 AM      Lab Results   Component Value Date/Time    Sodium 141 02/02/2019 03:24 AM    Potassium 4.0 02/02/2019 03:24 AM    Chloride 107 02/02/2019 03:24 AM    CO2 25 02/02/2019 03:24 AM    Anion gap 9 02/02/2019 03:24 AM    Glucose 127 (H) 02/02/2019 03:24 AM    BUN 18 02/02/2019 03:24 AM    Creatinine 1.13 (H) 02/02/2019 03:24 AM    BUN/Creatinine ratio 16 02/02/2019 03:24 AM    GFR est AA 57 (L) 02/02/2019 03:24 AM    GFR est non-AA 47 (L) 02/02/2019 03:24 AM    Calcium 8.5 02/02/2019 03:24 AM    Bilirubin, total 0.5 01/27/2019 02:27 AM    Alk.  phosphatase 80 01/27/2019 02:27 AM    Protein, total 8.0 01/27/2019 02:27 AM    Albumin 3.6 01/27/2019 02:27 AM    Globulin 4.4 (H) 01/27/2019 02:27 AM    A-G Ratio 0.8 (L) 01/27/2019 02:27 AM    ALT (SGPT) 19 01/27/2019 02:27 AM        KEHINDE Joy, [id number] [facility]  [facility]  [address] [facility] [address] [place] [address]  (A) [phone] (F) [phone]

## 2021-12-01 ENCOUNTER — OFFICE VISIT (OUTPATIENT)
Dept: CARDIOLOGY CLINIC | Age: 76
End: 2021-12-01
Payer: MEDICARE

## 2021-12-01 VITALS
HEIGHT: 64 IN | DIASTOLIC BLOOD PRESSURE: 68 MMHG | HEART RATE: 64 BPM | WEIGHT: 169 LBS | SYSTOLIC BLOOD PRESSURE: 128 MMHG | BODY MASS INDEX: 28.85 KG/M2 | OXYGEN SATURATION: 99 % | RESPIRATION RATE: 18 BRPM

## 2021-12-01 DIAGNOSIS — I50.22 CHRONIC SYSTOLIC CONGESTIVE HEART FAILURE (HCC): Primary | ICD-10-CM

## 2021-12-01 DIAGNOSIS — I25.10 CORONARY ARTERY DISEASE DUE TO LIPID RICH PLAQUE: ICD-10-CM

## 2021-12-01 DIAGNOSIS — I73.9 PAD (PERIPHERAL ARTERY DISEASE) (HCC): ICD-10-CM

## 2021-12-01 DIAGNOSIS — E78.5 DYSLIPIDEMIA, GOAL LDL BELOW 70: ICD-10-CM

## 2021-12-01 DIAGNOSIS — I10 HTN (HYPERTENSION), BENIGN: ICD-10-CM

## 2021-12-01 DIAGNOSIS — I25.83 CORONARY ARTERY DISEASE DUE TO LIPID RICH PLAQUE: ICD-10-CM

## 2021-12-01 PROCEDURE — 1101F PT FALLS ASSESS-DOCD LE1/YR: CPT | Performed by: NURSE PRACTITIONER

## 2021-12-01 PROCEDURE — G8754 DIAS BP LESS 90: HCPCS | Performed by: NURSE PRACTITIONER

## 2021-12-01 PROCEDURE — G8427 DOCREV CUR MEDS BY ELIG CLIN: HCPCS | Performed by: NURSE PRACTITIONER

## 2021-12-01 PROCEDURE — G8432 DEP SCR NOT DOC, RNG: HCPCS | Performed by: NURSE PRACTITIONER

## 2021-12-01 PROCEDURE — 3017F COLORECTAL CA SCREEN DOC REV: CPT | Performed by: NURSE PRACTITIONER

## 2021-12-01 PROCEDURE — 99214 OFFICE O/P EST MOD 30 MIN: CPT | Performed by: NURSE PRACTITIONER

## 2021-12-01 PROCEDURE — G0463 HOSPITAL OUTPT CLINIC VISIT: HCPCS | Performed by: NURSE PRACTITIONER

## 2021-12-01 PROCEDURE — G8752 SYS BP LESS 140: HCPCS | Performed by: NURSE PRACTITIONER

## 2021-12-01 PROCEDURE — G8419 CALC BMI OUT NRM PARAM NOF/U: HCPCS | Performed by: NURSE PRACTITIONER

## 2021-12-01 PROCEDURE — 1090F PRES/ABSN URINE INCON ASSESS: CPT | Performed by: NURSE PRACTITIONER

## 2021-12-01 PROCEDURE — G8400 PT W/DXA NO RESULTS DOC: HCPCS | Performed by: NURSE PRACTITIONER

## 2021-12-01 PROCEDURE — G8536 NO DOC ELDER MAL SCRN: HCPCS | Performed by: NURSE PRACTITIONER

## 2021-12-01 RX ORDER — ATORVASTATIN CALCIUM 80 MG/1
80 TABLET, FILM COATED ORAL EVERY EVENING
Qty: 90 TABLET | Refills: 1 | Status: SHIPPED | OUTPATIENT
Start: 2021-12-01 | End: 2022-04-25

## 2022-01-14 RX ORDER — LOSARTAN POTASSIUM 25 MG/1
TABLET ORAL
Qty: 90 TABLET | Refills: 0 | Status: SHIPPED | OUTPATIENT
Start: 2022-01-14 | End: 2022-04-06

## 2022-02-03 RX ORDER — CLOPIDOGREL BISULFATE 75 MG/1
TABLET ORAL
Qty: 90 TABLET | Refills: 0 | Status: SHIPPED | OUTPATIENT
Start: 2022-02-03 | End: 2022-05-02

## 2022-02-25 RX ORDER — ASPIRIN 81 MG/1
81 TABLET ORAL DAILY
Qty: 90 TABLET | Refills: 3 | Status: SHIPPED | OUTPATIENT
Start: 2022-02-25 | End: 2022-02-25 | Stop reason: SDUPTHER

## 2022-02-25 RX ORDER — ASPIRIN 81 MG/1
81 TABLET ORAL DAILY
Qty: 90 TABLET | Refills: 3 | Status: SHIPPED | OUTPATIENT
Start: 2022-02-25

## 2022-02-25 NOTE — TELEPHONE ENCOUNTER
Last appt: 12/1/2021  Future Appointments   Date Time Provider Lorin Pillai   6/16/2022  8:00 AM [facility]   6/16/2022  9:00 AM Kriss Person NP [facility]       Requested Prescriptions     Signed Prescriptions Disp Refills    aspirin delayed-release 81 mg tablet 90 Tablet 3     Sig: Take 1 Tablet by mouth daily. Authorizing Provider: Erik Liu     Ordering User: ADARSH IVEY         Refqueenie VO per ZACH Person.

## 2022-02-25 NOTE — TELEPHONE ENCOUNTER
Requested Prescriptions     Signed Prescriptions Disp Refills    aspirin delayed-release 81 mg tablet 90 Tablet 3     Sig: Take 1 Tablet by mouth daily. Authorizing Provider: Celeste Nichole     Ordering User: Farshad Reaves     Verbal order per Grant Gilbert NP.      Future Appointments   Date Time Provider Lorin Pillai   6/16/2022  8:00 AM [facility]   6/16/2022  9:00 AM Aba Person NP [facility]

## 2022-03-18 PROBLEM — R55 SYNCOPE AND COLLAPSE: Status: ACTIVE | Noted: 2019-01-26

## 2022-03-18 PROBLEM — E78.00 HYPERCHOLESTEREMIA: Status: ACTIVE | Noted: 2018-03-01

## 2022-03-18 PROBLEM — I65.21 CAROTID STENOSIS, RIGHT: Status: ACTIVE | Noted: 2019-02-01

## 2022-03-19 PROBLEM — R73.03 PRE-DIABETES: Status: ACTIVE | Noted: 2018-03-01

## 2022-03-19 PROBLEM — E11.21 TYPE 2 DIABETES WITH NEPHROPATHY (HCC): Status: ACTIVE | Noted: 2018-05-30

## 2022-03-19 PROBLEM — I21.4 NSTEMI (NON-ST ELEVATED MYOCARDIAL INFARCTION) (HCC): Status: ACTIVE | Noted: 2018-02-26

## 2022-03-19 PROBLEM — E11.9 DIABETES MELLITUS (HCC): Status: ACTIVE | Noted: 2018-04-17

## 2022-03-19 PROBLEM — I65.21 INTERNAL CAROTID ARTERY STENOSIS, RIGHT: Status: ACTIVE | Noted: 2019-02-01

## 2022-03-19 PROBLEM — I44.7 LBBB (LEFT BUNDLE BRANCH BLOCK): Status: ACTIVE | Noted: 2018-04-17

## 2022-03-19 PROBLEM — J96.01 ACUTE RESPIRATORY FAILURE WITH HYPOXIA (HCC): Status: ACTIVE | Noted: 2018-02-26

## 2022-03-19 PROBLEM — E87.6 HYPOKALEMIA: Status: ACTIVE | Noted: 2018-03-01

## 2022-03-19 PROBLEM — E78.5 DYSLIPIDEMIA, GOAL LDL BELOW 70: Status: ACTIVE | Noted: 2018-04-17

## 2022-03-19 PROBLEM — I50.21 ACUTE SYSTOLIC HEART FAILURE (HCC): Status: ACTIVE | Noted: 2018-02-27

## 2022-03-20 PROBLEM — I50.21 CHF NYHA CLASS II, ACUTE, SYSTOLIC (HCC): Status: ACTIVE | Noted: 2018-05-30

## 2022-03-20 PROBLEM — G93.41 ACUTE METABOLIC ENCEPHALOPATHY: Status: ACTIVE | Noted: 2018-02-26

## 2022-03-20 PROBLEM — I10 HTN (HYPERTENSION): Status: ACTIVE | Noted: 2018-03-01

## 2022-03-20 PROBLEM — I25.10 CORONARY ARTERY DISEASE INVOLVING NATIVE CORONARY ARTERY OF NATIVE HEART WITHOUT ANGINA PECTORIS: Status: ACTIVE | Noted: 2018-04-17

## 2022-04-06 RX ORDER — LOSARTAN POTASSIUM 25 MG/1
TABLET ORAL
Qty: 90 TABLET | Refills: 0 | Status: SHIPPED | OUTPATIENT
Start: 2022-04-06 | End: 2022-06-30

## 2022-04-06 NOTE — TELEPHONE ENCOUNTER
Per VO of Dr. Zonia Machado: 2021    Future Appointments   Date Time Provider Lorin Pillai   2022  8:00 AM [facility]   2022  9:00 AM Patricia Person, NP [facility]       Requested Prescriptions     Pending Prescriptions Disp Refills    losartan (COZAAR) 25 mg tablet [Pharmacy Med Name: LOSARTAN POTASSIUM 25 MG TAB] 90 Tablet 0     Sig: TAKE 1 TABLET BY MOUTH EVERY DAY

## 2022-04-25 ENCOUNTER — TELEPHONE (OUTPATIENT)
Dept: CARDIOLOGY CLINIC | Age: 77
End: 2022-04-25

## 2022-04-25 RX ORDER — ROSUVASTATIN CALCIUM 40 MG/1
40 TABLET, COATED ORAL
Qty: 90 TABLET | Refills: 1 | Status: SHIPPED | OUTPATIENT
Start: 2022-04-25 | End: 2022-10-05

## 2022-04-25 NOTE — TELEPHONE ENCOUNTER
Received recent labs,  and goal is < 70 for CAD  Please advise her to stop atorvastatin and begin rosuvastatin 40mg nightly  Will plan to recheck her lipids after her office visit in June

## 2022-04-25 NOTE — TELEPHONE ENCOUNTER
Identifiers x 2. Informed of labs and need to change cholesterol medication. Verbalized understanding. Requested Prescriptions     Signed Prescriptions Disp Refills    rosuvastatin (CRESTOR) 40 mg tablet 90 Tablet 1     Sig: Take 1 Tablet by mouth nightly. To take the place of atorvastatin. Authorizing Provider: Jaylan Melgar     Ordering User: Zahira Claudio     Written order per Kelly Dunlap NP. Mailed pamphlet regarding reducing cholesterol.

## 2022-05-02 RX ORDER — CLOPIDOGREL BISULFATE 75 MG/1
TABLET ORAL
Qty: 90 TABLET | Refills: 0 | Status: SHIPPED | OUTPATIENT
Start: 2022-05-02 | End: 2022-07-19

## 2022-05-10 ENCOUNTER — TELEPHONE (OUTPATIENT)
Dept: CARDIOLOGY CLINIC | Age: 77
End: 2022-05-10

## 2022-05-10 NOTE — TELEPHONE ENCOUNTER
Left voice message requesting a return call to reschedule 6/16/2022 appt with Charmayne Huxley, NP due to Provider covering [address].

## 2022-05-16 ENCOUNTER — TELEPHONE (OUTPATIENT)
Dept: CARDIOLOGY CLINIC | Age: 77
End: 2022-05-16

## 2022-05-16 NOTE — TELEPHONE ENCOUNTER
Labs requested from Dr. Ceferino Pickering.       5/17/22 Labs received and given to [facility] CLEMENTINE SERRANO.

## 2022-06-07 RX ORDER — FUROSEMIDE 40 MG/1
TABLET ORAL
Qty: 90 TABLET | Refills: 3 | Status: SHIPPED | OUTPATIENT
Start: 2022-06-07 | End: 2022-06-23 | Stop reason: SDUPTHER

## 2022-06-21 NOTE — PROGRESS NOTES
CHIEF COMPLAINT       Patient is being seen today for followup of her CHF. HISTORY OF PRESENT ILLNESS      She is feeling well   No chest pain or palpitations  No dizziness or lightheadedness  Has her baseline PALACIO but no PND  No LE edema  Reviewed home BP readings - BP is very well controlled, has elevations in BP when she goes to MD appts  She is staying active in her garden, has to take breaks every now and then for fatigue   She is able to do everything she wants to do, her energy level is good   Discussed her echo results from today and let her know that her EF has returned to normal  She was thrilled to hear this, did not want ICD, conversations about this caused anxiety  She has said in the past that she is a DNR and is at peace with whatever happens to her  She can't wait to see her  again in [facility]    **After her last office visit labs received dated April 19, 2022  CBC okay, BUN 14, creatinine 1.2, sodium 139, potassium 4.4, LFTs normal   , TG 86, HDL 73, , A1c 6.5%    **After her last office visit labs received dated 10/18/22  Hgb 13.1, BUN 23, Cr 1.2, Na 140, K 4.3  , , HDL 76, LDL 95  A1C 6.6%, Vit D 36.6    **Likes for Jd to do her echocardiograms  **Admits to being very modest, likes shades closed for ECGs, refused to do ECG today because it was just done at Dr. Barnes An office in April 2022, will request a copy for review      ASSESSMENT & PLAN     1.  NSTEMI/CAD - troponin peaked at 2.4, cardiac cath revealed significant RCA disease with collaterals and other non-obstructive CAD, no PCI needed, managing CAD medically  -continue ASA 81mg daily, Toprol XL, statin  -gave her Rx for NTG SL tabs PRN chest pain and instructed on use   3.   Dyslipidemia -  on atorvastatin 80mg daily, LDL goal < 70, now on rosuvastatin 40mg daily, will check lipids in 1 month, rosuvastatin has a large copay for her so will need to decide whether the cost of rosuvastatin is justified by the change in her LDL, advised her to increase her fish oil to 2 caps daily   -could not get zetia in the past   4. HTN - well controlled on home readings, continue losartan and Toprol XL   5.  DM Type 2 - management per PCP  6. LBBB - since 2/18   7. Hypokalemia - reducing lasix dose as below so I advised patient to reduce KCL dose to 10meq daily   8. PAD -followed by vascular surgery/Dr. Davy Duggan, stable at last visit, will see him again in 2023  -Dr. Davy Duggan would like patient to continue plavix     9. Chronic diastolic heart failure - LVEF 15-20% by initial TTE 2/18, then EF was 35-40%, today EF is normal on TTE  -NYHA Class I-II   -continue Toprol XL 25mg daily, losartan 25mg daily, advised her to reduce lasix dose to 20mg daily   -advised her to call the office if she develops more dyspnea or more swelling on lower dose of lasix   -did not like entresto and will not retrial  -declined ICD, found the idea extremely stressful and even caused tears discussing it, DNR in place  -now her EF is normal so ICD is not indicated   -she will plan to follow up with Dr. Galina López in 6 months and myself again in 1 year, she would like to go to just annual visits but I encouraged her to continue the every 6 month visits for now, discussed possibly eliminating losartan or Toprol XL in one year if BP is well controlled and EF remains normal      Fam Hx: + early CAD, father passed from MI at age 62, mother passed at age 80 after MI with CHF, two brothers with CABG (age unknown)  Soc Hx: no tobacco use, no etoh use     Echo 6/22 - (prelim) EF normal   Echo 6/21 - EF 35-40%, mod AV sclerosis, mild to mod MR, ascend Ao 4 cm  Echo 1/19 - (prelim) LVEF 35-40%  Cardiac Cath 2/18 - Normal LM and LAD, 30% proximal LCX, RCA proximal 80% then mid 100%, fills well distally via left to right collaterals. LVEF 20%. Echo 2/18 - LV mildly dilated.  LVEF 15-20%, severe diffuse hypokinesis, wall thickness was mildly increased, mild to mod MR, mild TR     ACTIVE PROBLEM LIST     Patient Active Problem List    Diagnosis Date Noted    Carotid stenosis, right 02/01/2019    Internal carotid artery stenosis, right 02/01/2019    Syncope and collapse 01/26/2019    Type 2 diabetes with nephropathy ([address] 05/30/2018    CHF NYHA class II, acute, systolic ([address] 61/00/0914    Coronary artery disease involving native coronary artery of native heart without angina pectoris 04/17/2018    Dyslipidemia, goal LDL below 70 04/17/2018    LBBB (left bundle branch block) 04/17/2018    Diabetes mellitus ([address] 04/17/2018    Hypokalemia 03/01/2018    Pre-diabetes 03/01/2018    HTN (hypertension) 03/01/2018    Hypercholesteremia 17/08/5503    Acute systolic heart failure ([address] 26/64/5100    Acute metabolic encephalopathy 70/16/3178    NSTEMI (non-ST elevated myocardial infarction) ([address] 02/26/2018    Acute respiratory failure with hypoxia ([address] 02/26/2018           PAST MEDICAL HISTORY     Past Medical History:   Diagnosis Date    CAD (coronary artery disease) 02/26/2018    NSTEMI    Congestive heart failure ([address] 02/26/2018    Diabetes ([address] 02/26/2018    pre-DM    Hypertension            PAST SURGICAL HISTORY     Past Surgical History:   Procedure Laterality Date    HX HEENT Bilateral 05/2012    cataracts          ALLERGIES     Allergies   Allergen Reactions    Adhesive Tape-Silicones Rash          FAMILY HISTORY     Family History   Problem Relation Age of Onset    Heart Disease Mother     Heart Disease Father 62        MI    Diabetes Sister     Heart Disease Brother 71        CABG    Cancer Maternal Grandmother     Pulmonary Embolism Paternal Grandmother     Heart Disease Paternal Grandfather     Heart Disease Brother 59        CABG    Cancer Brother     Liver Disease Brother         HEPATITIS    No Known Problems Daughter     Other Daughter         KIDNEY ISSUES, UNKNOWN NAME    No Known Problems Daughter     Anesth Problems Neg Hx     negative for cardiac disease       SOCIAL HISTORY     Social History     Socioeconomic History    Marital status: SINGLE   Tobacco Use    Smoking status: Never Smoker    Smokeless tobacco: Never Used   Substance and Sexual Activity    Alcohol use: No    Drug use: No         MEDICATIONS     Current Outpatient Medications   Medication Sig    aspirin delayed-release 81 mg tablet Take 1 Tablet by mouth daily. Klor-Con M20 20 mEq tablet TAKE 1 TABLET BY MOUTH EVERY DAY    furosemide (LASIX) 40 mg tablet TAKE 1 TABLET BY MOUTH EVERY DAY    clopidogreL (PLAVIX) 75 mg tab TAKE 1 TABLET BY MOUTH EVERY DAY    rosuvastatin (CRESTOR) 40 mg tablet Take 1 Tablet by mouth nightly. To take the place of atorvastatin.    losartan (COZAAR) 25 mg tablet TAKE 1 TABLET BY MOUTH EVERY DAY    metoprolol succinate (TOPROL-XL) 25 mg XL tablet Take 0.5 Tabs by mouth nightly. (Patient taking differently: Take 25 mg by mouth daily.)    DOCOSAHEXANOIC ACID/EPA (FISH OIL PO) Take  by mouth daily. multivitamin (ONE A DAY) tablet Take 1 Tab by mouth daily. No current facility-administered medications for this visit. I have reviewed the nurses notes, vitals, problem list, allergy list, medical history, family, social history and medications. REVIEW OF SYMPTOMS     Constitutional: Negative for fever, chills, malaise/fatigue and diaphoresis. Respiratory: Negative for cough, hemoptysis, sputum production, and wheezing. Cardiovascular: Negative for chest pain, palpitations, orthopnea, claudication, leg swelling and PND. Gastrointestinal: Negative for heartburn, nausea, vomiting, blood in stool and melena. Genitourinary: Negative for dysuria and flank pain. Musculoskeletal: Negative for joint pain and back pain. Skin: Negative for rash. Neurological: Negative for focal weakness, seizures, loss of consciousness, weakness and headaches. Endo/Heme/Allergies: Negative for abnormal bleeding.     Psychiatric/Behavioral: Negative for memory loss.      PHYSICAL EXAMINATION       General appearance - alert, well appearing, and in no distress  Mental status - affect appropriate to mood  Eyes - sclera anicteric, moist mucous membranes  Neck - supple  Lymphatics - not assessed  Chest - clear to auscultation, no wheezes, rales or rhonchi  Heart - normal rate, regular rhythm, normal S1, S2, 1/6 IBETH   Abdomen - soft, nontender, nondistended  Back exam - full range of motion, no tenderness  Neurological - cranial nerves II through XII grossly intact, no focal deficit  Musculoskeletal - no muscular tenderness noted, normal strength  Extremities - peripheral pulses normal, no pedal edema  Skin - normal coloration  no rashes       LABORATORY DATA      Lab Results   Component Value Date/Time    WBC 9.7 02/02/2019 03:24 AM    Hemoglobin (POC) 12.6 02/26/2018 01:50 AM    HGB 9.0 (L) 02/02/2019 03:24 AM    Hematocrit (POC) 37 02/26/2018 01:50 AM    HCT 28.3 (L) 02/02/2019 03:24 AM    PLATELET 113 02/87/3352 03:24 AM    MCV 98.6 02/02/2019 03:24 AM      Lab Results   Component Value Date/Time    Sodium 141 02/02/2019 03:24 AM    Potassium 4.0 02/02/2019 03:24 AM    Chloride 107 02/02/2019 03:24 AM    CO2 25 02/02/2019 03:24 AM    Anion gap 9 02/02/2019 03:24 AM    Glucose 127 (H) 02/02/2019 03:24 AM    BUN 18 02/02/2019 03:24 AM    Creatinine 1.13 (H) 02/02/2019 03:24 AM    BUN/Creatinine ratio 16 02/02/2019 03:24 AM    GFR est AA 57 (L) 02/02/2019 03:24 AM    GFR est non-AA 47 (L) 02/02/2019 03:24 AM    Calcium 8.5 02/02/2019 03:24 AM    Bilirubin, total 0.5 01/27/2019 02:27 AM    Alk.  phosphatase 80 01/27/2019 02:27 AM    Protein, total 8.0 01/27/2019 02:27 AM    Albumin 3.6 01/27/2019 02:27 AM    Globulin 4.4 (H) 01/27/2019 02:27 AM    A-G Ratio 0.8 (L) 01/27/2019 02:27 AM    ALT (SGPT) 19 01/27/2019 02:27 AM        Laura De Dios, ACNP, AACC  [facility]  [address] , [address] [place] [address],[address]  [address]  (O) [phone] (Fax) [phone]

## 2022-06-23 ENCOUNTER — ANCILLARY PROCEDURE (OUTPATIENT)
Dept: CARDIOLOGY CLINIC | Age: 77
End: 2022-06-23
Payer: MEDICARE

## 2022-06-23 ENCOUNTER — OFFICE VISIT (OUTPATIENT)
Dept: CARDIOLOGY CLINIC | Age: 77
End: 2022-06-23
Payer: MEDICARE

## 2022-06-23 VITALS
SYSTOLIC BLOOD PRESSURE: 150 MMHG | DIASTOLIC BLOOD PRESSURE: 80 MMHG | BODY MASS INDEX: 29.53 KG/M2 | WEIGHT: 173 LBS | HEIGHT: 64 IN

## 2022-06-23 VITALS
WEIGHT: 169 LBS | SYSTOLIC BLOOD PRESSURE: 119 MMHG | HEIGHT: 64 IN | OXYGEN SATURATION: 97 % | DIASTOLIC BLOOD PRESSURE: 77 MMHG | BODY MASS INDEX: 28.85 KG/M2 | RESPIRATION RATE: 14 BRPM | HEART RATE: 70 BPM

## 2022-06-23 DIAGNOSIS — E78.5 DYSLIPIDEMIA, GOAL LDL BELOW 70: ICD-10-CM

## 2022-06-23 DIAGNOSIS — I25.10 CORONARY ARTERY DISEASE INVOLVING NATIVE HEART WITHOUT ANGINA PECTORIS, UNSPECIFIED VESSEL OR LESION TYPE: ICD-10-CM

## 2022-06-23 DIAGNOSIS — I25.10 CORONARY ARTERY DISEASE INVOLVING NATIVE CORONARY ARTERY OF NATIVE HEART WITHOUT ANGINA PECTORIS: ICD-10-CM

## 2022-06-23 DIAGNOSIS — E78.5 DYSLIPIDEMIA: ICD-10-CM

## 2022-06-23 DIAGNOSIS — I21.4 NSTEMI (NON-ST ELEVATED MYOCARDIAL INFARCTION) (HCC): ICD-10-CM

## 2022-06-23 DIAGNOSIS — I50.21 CHF NYHA CLASS II, ACUTE, SYSTOLIC (HCC): ICD-10-CM

## 2022-06-23 DIAGNOSIS — I50.32 CHRONIC DIASTOLIC CONGESTIVE HEART FAILURE (HCC): ICD-10-CM

## 2022-06-23 DIAGNOSIS — E87.6 HYPOKALEMIA: ICD-10-CM

## 2022-06-23 DIAGNOSIS — I73.9 PAD (PERIPHERAL ARTERY DISEASE) (HCC): ICD-10-CM

## 2022-06-23 DIAGNOSIS — I10 HTN (HYPERTENSION), BENIGN: Primary | ICD-10-CM

## 2022-06-23 DIAGNOSIS — I50.22 CHRONIC SYSTOLIC CONGESTIVE HEART FAILURE (HCC): ICD-10-CM

## 2022-06-23 LAB
ECHO AO ASC DIAM: 4.1 CM
ECHO AO ASCENDING AORTA INDEX: 2.23 CM/M2
ECHO AO ROOT DIAM: 2.8 CM
ECHO AO ROOT INDEX: 1.52 CM/M2
ECHO AV AREA PEAK VELOCITY: 1.8 CM2
ECHO AV AREA VTI: 2 CM2
ECHO AV AREA/BSA PEAK VELOCITY: 1 CM2/M2
ECHO AV AREA/BSA VTI: 1.1 CM2/M2
ECHO AV MEAN GRADIENT: 9 MMHG
ECHO AV MEAN VELOCITY: 1.4 M/S
ECHO AV PEAK GRADIENT: 16 MMHG
ECHO AV PEAK VELOCITY: 2 M/S
ECHO AV VTI: 39.4 CM
ECHO EST RA PRESSURE: 3 MMHG
ECHO LA DIAMETER INDEX: 2.61 CM/M2
ECHO LA DIAMETER: 4.8 CM
ECHO LA TO AORTIC ROOT RATIO: 1.71
ECHO LA VOL 2C: 79 ML (ref 22–52)
ECHO LA VOL 4C: 73 ML (ref 22–52)
ECHO LA VOL BP: 77 ML (ref 22–52)
ECHO LA VOL/BSA BIPLANE: 42 ML/M2 (ref 16–34)
ECHO LA VOLUME AREA LENGTH: 79 ML
ECHO LA VOLUME INDEX A2C: 43 ML/M2 (ref 16–34)
ECHO LA VOLUME INDEX A4C: 40 ML/M2 (ref 16–34)
ECHO LA VOLUME INDEX AREA LENGTH: 43 ML/M2 (ref 16–34)
ECHO LV E' LATERAL VELOCITY: 11 CM/S
ECHO LV E' SEPTAL VELOCITY: 8 CM/S
ECHO LV FRACTIONAL SHORTENING: 24 % (ref 28–44)
ECHO LV INTERNAL DIMENSION DIASTOLE INDEX: 2.66 CM/M2
ECHO LV INTERNAL DIMENSION DIASTOLIC: 4.9 CM (ref 3.9–5.3)
ECHO LV INTERNAL DIMENSION SYSTOLIC INDEX: 2.01 CM/M2
ECHO LV INTERNAL DIMENSION SYSTOLIC: 3.7 CM
ECHO LV ISOVOLUMETRIC RELAXATION TIME (IVRT): 59 MS
ECHO LV IVSD: 1.4 CM (ref 0.6–0.9)
ECHO LV MASS 2D: 297.5 G (ref 67–162)
ECHO LV MASS INDEX 2D: 161.7 G/M2 (ref 43–95)
ECHO LV POSTERIOR WALL DIASTOLIC: 1.5 CM (ref 0.6–0.9)
ECHO LV RELATIVE WALL THICKNESS RATIO: 0.61
ECHO LVOT AREA: 4.5 CM2
ECHO LVOT AV VTI INDEX: 0.42
ECHO LVOT DIAM: 2.4 CM
ECHO LVOT MEAN GRADIENT: 1 MMHG
ECHO LVOT PEAK GRADIENT: 2 MMHG
ECHO LVOT PEAK GRADIENT: 2 MMHG
ECHO LVOT PEAK VELOCITY: 0.7 M/S
ECHO LVOT PEAK VELOCITY: 0.8 M/S
ECHO LVOT STROKE VOLUME INDEX: 40.8 ML/M2
ECHO LVOT SV: 75.1 ML
ECHO LVOT VTI: 16.6 CM
ECHO MV "A" WAVE DURATION: 148.4 MSEC
ECHO MV A VELOCITY: 1.01 M/S
ECHO MV E DECELERATION TIME (DT): 139.2 MS
ECHO MV E VELOCITY: 0.53 M/S
ECHO MV E/A RATIO: 0.52
ECHO MV E/E' LATERAL: 4.82
ECHO MV E/E' RATIO (AVERAGED): 5.72
ECHO MV E/E' SEPTAL: 6.63
ECHO PVEIN A DURATION: 97.1 MS
ECHO PVEIN A VELOCITY: 0.3 M/S
ECHO RIGHT VENTRICULAR SYSTOLIC PRESSURE (RVSP): 31 MMHG
ECHO RV FREE WALL PEAK S': 20 CM/S
ECHO RV TAPSE: 2.6 CM (ref 1.7–?)
ECHO TV REGURGITANT MAX VELOCITY: 2.66 M/S
ECHO TV REGURGITANT PEAK GRADIENT: 28 MMHG

## 2022-06-23 PROCEDURE — G8432 DEP SCR NOT DOC, RNG: HCPCS | Performed by: NURSE PRACTITIONER

## 2022-06-23 PROCEDURE — 1123F ACP DISCUSS/DSCN MKR DOCD: CPT | Performed by: NURSE PRACTITIONER

## 2022-06-23 PROCEDURE — 93010 ELECTROCARDIOGRAM REPORT: CPT | Performed by: NURSE PRACTITIONER

## 2022-06-23 PROCEDURE — 99214 OFFICE O/P EST MOD 30 MIN: CPT | Performed by: NURSE PRACTITIONER

## 2022-06-23 PROCEDURE — G8754 DIAS BP LESS 90: HCPCS | Performed by: NURSE PRACTITIONER

## 2022-06-23 PROCEDURE — G8417 CALC BMI ABV UP PARAM F/U: HCPCS | Performed by: NURSE PRACTITIONER

## 2022-06-23 PROCEDURE — G8427 DOCREV CUR MEDS BY ELIG CLIN: HCPCS | Performed by: NURSE PRACTITIONER

## 2022-06-23 PROCEDURE — G8752 SYS BP LESS 140: HCPCS | Performed by: NURSE PRACTITIONER

## 2022-06-23 PROCEDURE — G8400 PT W/DXA NO RESULTS DOC: HCPCS | Performed by: NURSE PRACTITIONER

## 2022-06-23 PROCEDURE — 93005 ELECTROCARDIOGRAM TRACING: CPT | Performed by: NURSE PRACTITIONER

## 2022-06-23 PROCEDURE — 1090F PRES/ABSN URINE INCON ASSESS: CPT | Performed by: NURSE PRACTITIONER

## 2022-06-23 PROCEDURE — 93306 TTE W/DOPPLER COMPLETE: CPT | Performed by: INTERNAL MEDICINE

## 2022-06-23 PROCEDURE — G0463 HOSPITAL OUTPT CLINIC VISIT: HCPCS | Performed by: NURSE PRACTITIONER

## 2022-06-23 PROCEDURE — 3017F COLORECTAL CA SCREEN DOC REV: CPT | Performed by: NURSE PRACTITIONER

## 2022-06-23 PROCEDURE — 1101F PT FALLS ASSESS-DOCD LE1/YR: CPT | Performed by: NURSE PRACTITIONER

## 2022-06-23 PROCEDURE — G8536 NO DOC ELDER MAL SCRN: HCPCS | Performed by: NURSE PRACTITIONER

## 2022-06-23 RX ORDER — POTASSIUM CHLORIDE 20 MEQ/1
10 TABLET, EXTENDED RELEASE ORAL DAILY
Qty: 30 TABLET | Refills: 0
Start: 2022-06-23

## 2022-06-23 RX ORDER — FUROSEMIDE 40 MG/1
20 TABLET ORAL DAILY
Qty: 30 TABLET | Refills: 0
Start: 2022-06-23

## 2022-06-23 RX ORDER — METOPROLOL SUCCINATE 25 MG/1
25 TABLET, EXTENDED RELEASE ORAL
Qty: 90 TABLET | Refills: 3 | Status: SHIPPED | OUTPATIENT
Start: 2022-06-23

## 2022-06-23 RX ORDER — MELATONIN 2.5MG/10ML
2 LIQUID (ML) ORAL DAILY
Qty: 90 CAPSULE | Refills: 0
Start: 2022-06-23

## 2022-06-23 RX ORDER — NITROGLYCERIN 0.4 MG/1
0.4 TABLET SUBLINGUAL
Qty: 25 EACH | Refills: 1 | Status: SHIPPED | OUTPATIENT
Start: 2022-06-23

## 2022-06-23 NOTE — PATIENT INSTRUCTIONS
Please increase your fish oil to TWO 1200mg capsules daily     Please reduce your furosemide to 20mg (1/2 of 40mg tablet) daily     Please reduce your Klor-Con to 10meq (1/2 of 20meq tablet) daily     Let me know in a few weeks if you are tolerating the lower dose of furosemide and Klor Con - not having any swelling or increased shortness of breath. If you are doing well, I'll send in updated Rxs for those medications. You have been given a prescription for Nitroglycerin to take ONLY AS NEEDED for chest pain. You can take one tablet under your tongue for chest pain every 5 minutes up to a total of 3 tablets. If your chest pain is not gone after the 3rd tab, call 9-11 and go to the nearest emergency room.     Please ask Dr. aDvy Duggan if it's ok for you to stop your clopidogrel

## 2022-07-19 RX ORDER — CLOPIDOGREL BISULFATE 75 MG/1
TABLET ORAL
Qty: 90 TABLET | Refills: 0 | Status: SHIPPED | OUTPATIENT
Start: 2022-07-19 | End: 2022-10-14

## 2022-10-05 ENCOUNTER — TELEPHONE (OUTPATIENT)
Dept: CARDIOLOGY CLINIC | Age: 77
End: 2022-10-05

## 2022-10-05 RX ORDER — ROSUVASTATIN CALCIUM 40 MG/1
40 TABLET, COATED ORAL
Qty: 90 TABLET | Refills: 1 | Status: SHIPPED | OUTPATIENT
Start: 2022-10-05 | End: 2022-11-02

## 2022-10-05 NOTE — TELEPHONE ENCOUNTER
Called pt and there was no answer, left message letting her know per Ana Maria Humphries  that her statin was refilled but that she must have labs drawn, order still active for lab

## 2022-10-05 NOTE — TELEPHONE ENCOUNTER
Refilled statin, please ask her to have fasting labs drawn to check cholesterol    Future Appointments   Date Time Provider Lorin Pillai   12/23/2022  9:00 AM MD BILL Hinojosa AMB   6/26/2023  9:00 AM Laurie Person NP [facility] normal...

## 2022-10-14 ENCOUNTER — TELEPHONE (OUTPATIENT)
Dept: CARDIOLOGY CLINIC | Age: 77
End: 2022-10-14

## 2022-10-14 RX ORDER — CLOPIDOGREL BISULFATE 75 MG/1
75 TABLET ORAL DAILY
Qty: 90 TABLET | Refills: 3 | Status: SHIPPED | OUTPATIENT
Start: 2022-10-14

## 2022-10-14 NOTE — TELEPHONE ENCOUNTER
Refilled plavix for another month but please ask her if she spoke to Dr. Dimitri Carey about her plavix    My last note says \"advised her that she could stop her clopidogrel at this time if it was ok with Dr. Dimitri Carey in reference to her PAD\"

## 2022-10-14 NOTE — TELEPHONE ENCOUNTER
Called and left a message asking pt to follow up with dr Barber Lawton in regards to stopping  plavix

## 2022-10-19 LAB — HBA1C MFR BLD HPLC: 6.6 %

## 2022-10-27 ENCOUNTER — TELEPHONE (OUTPATIENT)
Dept: CARDIOLOGY CLINIC | Age: 77
End: 2022-10-27

## 2022-10-27 NOTE — TELEPHONE ENCOUNTER
Please call patient and let her know I got her labs. LDL 95 on rosuvastatin, LDL had been 111 on atorvastatin but needs to be less than 70. I'm not sure the difference in the LDL levels between the two statins warrants continuing rosuvastatin for the high copay she had reported. Please ask if she would be interested in trying a PCSK9 inhibitor which would require one subcutaneous injection twice/week to bring down her LDL. If so we can apply to her insurance company for coverage and see what happens. If not, she can decide if she wants to continue atorvastatin or rosuvastatin since the change in LDL was not significant. Let me know what she decides.      Future Appointments   Date Time Provider Lorin Pillai   12/23/2022  9:00 AM MD BILL Ferraro   6/26/2023  9:00 AM Kate Person NP [facility]

## 2022-10-31 NOTE — TELEPHONE ENCOUNTER
Devaughn Cortez, ZACH 4 days ago     KD  Please call patient and let her know I got her labs. LDL 95 on rosuvastatin, LDL had been 111 on atorvastatin but needs to be less than 70. I'm not sure the difference in the LDL levels between the two statins warrants continuing rosuvastatin for the high copay she had reported. Please ask if she would be interested in trying a PCSK9 inhibitor which would require one subcutaneous injection twice/week to bring down her LDL. If so we can apply to her insurance company for coverage and see what happens. If not, she can decide if she wants to continue atorvastatin or rosuvastatin since the change in LDL was not significant. Let me know what she decides.           Spoke with pt and verified 2 pt identifiers, let pt know above information, she does not want to do the PCSK9 inhibitor, she would like to finish the bottle of rosuvastatin and then she would like to go back to atorvastatin due to cost.

## 2022-11-02 DIAGNOSIS — E78.5 DYSLIPIDEMIA, GOAL LDL BELOW 70: Primary | ICD-10-CM

## 2022-11-02 RX ORDER — ATORVASTATIN CALCIUM 80 MG/1
TABLET, FILM COATED ORAL
Qty: 90 TABLET | Refills: 3 | Status: SHIPPED | OUTPATIENT
Start: 2022-11-02

## 2022-11-02 NOTE — TELEPHONE ENCOUNTER
Ok, please send Rx for atorvastatin 80mg nightly to her pharmacy and when she runs out of rosuvastatin she can go back on atorvastatin 80mg nightly.

## 2022-11-02 NOTE — TELEPHONE ENCOUNTER
Prescription sent per VO per NP    Future Appointments   Date Time Provider Lorin Pillai   12/23/2022  9:00 AM MD BILL Lopez AMB   6/26/2023  9:00 AM Josias Person NP [facility]

## 2023-01-13 ENCOUNTER — OFFICE VISIT (OUTPATIENT)
Dept: CARDIOLOGY CLINIC | Age: 78
End: 2023-01-13
Payer: MEDICARE

## 2023-01-13 VITALS
WEIGHT: 169 LBS | HEIGHT: 64 IN | DIASTOLIC BLOOD PRESSURE: 96 MMHG | SYSTOLIC BLOOD PRESSURE: 160 MMHG | BODY MASS INDEX: 28.85 KG/M2 | RESPIRATION RATE: 16 BRPM | HEART RATE: 95 BPM | OXYGEN SATURATION: 95 %

## 2023-01-13 DIAGNOSIS — I25.10 CORONARY ARTERY DISEASE INVOLVING NATIVE CORONARY ARTERY OF NATIVE HEART WITHOUT ANGINA PECTORIS: ICD-10-CM

## 2023-01-13 DIAGNOSIS — E87.6 HYPOKALEMIA: Primary | ICD-10-CM

## 2023-01-13 DIAGNOSIS — E78.5 DYSLIPIDEMIA, GOAL LDL BELOW 70: ICD-10-CM

## 2023-01-13 DIAGNOSIS — I21.4 NSTEMI (NON-ST ELEVATED MYOCARDIAL INFARCTION) (HCC): ICD-10-CM

## 2023-01-13 DIAGNOSIS — E11.59 TYPE 2 DIABETES MELLITUS WITH OTHER CIRCULATORY COMPLICATION, WITHOUT LONG-TERM CURRENT USE OF INSULIN (HCC): ICD-10-CM

## 2023-01-13 DIAGNOSIS — I44.7 LBBB (LEFT BUNDLE BRANCH BLOCK): ICD-10-CM

## 2023-01-13 DIAGNOSIS — I65.23 BILATERAL CAROTID ARTERY STENOSIS: ICD-10-CM

## 2023-01-13 DIAGNOSIS — E11.21 TYPE 2 DIABETES WITH NEPHROPATHY (HCC): ICD-10-CM

## 2023-01-13 DIAGNOSIS — I10 HYPERTENSION, UNSPECIFIED TYPE: ICD-10-CM

## 2023-01-13 DIAGNOSIS — I50.21 ACUTE SYSTOLIC HEART FAILURE (HCC): ICD-10-CM

## 2023-01-13 NOTE — PROGRESS NOTES
Shadi Dang MD, MS, [address] [facility]            HISTORY OF PRESENT ILLNESS:    Lore Chou is a 69 y.o. female here for FU. 1.  NSTEMI/CAD - troponin peaked at 2.4, cardiac cath revealed significant RCA disease with collaterals and other non-obstructive CAD, no PCI needed, managing CAD medically  -continue ASA 81mg daily, Toprol XL, statin  -gave her Rx for NTG SL tabs PRN chest pain and instructed on use   3. Dyslipidemia -  on atorvastatin 80mg daily, LDL goal < 70, now on rosuvastatin 40mg daily, will check lipids in 1 month, rosuvastatin has a large copay for her so will need to decide whether the cost of rosuvastatin is justified by the change in her LDL, advised her to increase her fish oil to 2 caps daily   -could not get zetia in the past   4. HTN - well controlled on home readings, continue losartan and Toprol XL   5.  DM Type 2 - management per PCP  6. LBBB - since 2/18   7. Hypokalemia - reducing lasix dose as below so I advised patient to reduce KCL dose to 10meq daily   8. PAD -followed by vascular surgery/Dr. Steve Peterson, stable at last visit, will see him again in 2023  -Dr. Steve Peterson would like patient to continue plavix  R CEA    9.   Chronic diastolic heart failure - LVEF 15-20% by initial TTE 2/18, then EF was 35-40%, today EF is normal on TTE  -NYHA Class I-II   -continue Toprol XL 25mg daily, losartan 25mg daily, advised her to reduce lasix dose to 20mg daily   -advised her to call the office if she develops more dyspnea or more swelling on lower dose of lasix   -did not like entresto and will not retrial  -declined ICD, found the idea extremely stressful and even caused tears discussing it, DNR in place  -now her EF is normal so ICD is not indicated   -split FU between Kole SERRANO and myself     Fam Hx: + early CAD, father passed from MI at age 62, mother passed at age 80 after MI with CHF, two brothers with CABG (age unknown)  Soc Hx: no tobacco use, no etoh use     Echo 6/22 - EF normal   Echo 6/21 - EF 35-40%, mod AV sclerosis, mild to mod MR, ascend Ao 4 cm  Echo 1/19 - (prelim) LVEF 35-40%  Cardiac Cath 2/18 - Normal LM and LAD, 30% proximal LCX, RCA proximal 80% then mid 100%, fills well distally via left to right collaterals. LVEF 20%. Echo 2/18 - LV mildly dilated. LVEF 15-20%, severe diffuse hypokinesis, wall thickness was mildly increased, mild to mod MR, mild TR    A1C 6.5. . Discussed PCSK9 given CAD, carotid disease, LDL not at goal 0- she is not receptive to this at this time.       SUMMARY:   Problem List  Date Reviewed: 1/13/2023            Codes Class Noted    Bilateral carotid artery stenosis ICD-10-CM: I65.23  ICD-9-CM: 433.10, 433.30  2/1/2019        Internal carotid artery stenosis, right ICD-10-CM: I65.21  ICD-9-CM: 433.10  2/1/2019        Syncope and collapse ICD-10-CM: R55  ICD-9-CM: 780.2  1/26/2019        Type 2 diabetes with nephropathy (HCC) ICD-10-CM: E11.21  ICD-9-CM: 250.40, 583.81  5/30/2018        CHF NYHA class II, acute, systolic (HCC) PEX-05-IZ: I50.21  ICD-9-CM: 428.21, 428.0  5/30/2018        Coronary artery disease involving native coronary artery of native heart without angina pectoris ICD-10-CM: I25.10  ICD-9-CM: 414.01  4/17/2018        Dyslipidemia, goal LDL below 70 ICD-10-CM: E78.5  ICD-9-CM: 272.4  4/17/2018        LBBB (left bundle branch block) ICD-10-CM: I44.7  ICD-9-CM: 426.3  4/17/2018        Diabetes mellitus ([facility] [address] ICD-10-CM: E11.9  ICD-9-CM: 250.00  4/17/2018        Hypokalemia ICD-10-CM: E87.6  ICD-9-CM: 276.8  3/1/2018        Pre-diabetes ICD-10-CM: R73.03  ICD-9-CM: 790.29  3/1/2018        HTN (hypertension) ICD-10-CM: I10  ICD-9-CM: 401.9  3/1/2018        Hypercholesteremia ICD-10-CM: E78.00  ICD-9-CM: 272.0  3/1/2018        Acute systolic heart failure (HCC) ICD-10-CM: I50.21  ICD-9-CM: 428.21  2/27/2018        Acute metabolic encephalopathy [facility]-88-CS: G93.41  ICD-9-CM: 348.31  2/26/2018        NSTEMI (non-ST elevated myocardial infarction) [facility]) ICD-10-CM: I21.4  ICD-9-CM: 410.70  2/26/2018        Acute respiratory failure with hypoxia [facility]) ICD-10-CM: J96.01  ICD-9-CM: 518.81  2/26/2018           Current Outpatient Medications on File Prior to Visit   Medication Sig    potassium chloride (Klor-Con M20) 20 mEq tablet Take 0.5 Tablets by mouth daily. atorvastatin (LIPITOR) 80 mg tablet Take one tablet by mouth nightly    clopidogreL (PLAVIX) 75 mg tab Take 1 Tablet by mouth daily. losartan (COZAAR) 25 mg tablet TAKE 1 TABLET BY MOUTH EVERY DAY    metoprolol succinate (TOPROL-XL) 25 mg XL tablet Take 1 Tablet by mouth nightly. Omega-3-DHA-EPA-Fish Oil (Fish OiL) 1,200 (144-216) mg cap Take 2 Capsules by mouth daily. furosemide (LASIX) 40 mg tablet Take 0.5 Tablets by mouth daily. aspirin delayed-release 81 mg tablet Take 1 Tablet by mouth daily. multivitamin (ONE A DAY) tablet Take 1 Tab by mouth daily. nitroglycerin (NITROSTAT) 0.4 mg SL tablet 1 Tablet by SubLINGual route every five (5) minutes as needed for Chest Pain for up to 3 doses. No current facility-administered medications on file prior to visit. CARDIOLOGY STUDIES TO DATE:  No results found for this visit on 01/13/23.      06/23/22    ECHO ADULT COMPLETE 06/23/2022 6/23/2022    Interpretation Summary    Left Ventricle: Normal left ventricular systolic function with a visually estimated EF of 55 - 60%. Left ventricle size is normal. Moderately increased wall thickness. Normal wall motion. Grade I diastolic dysfunction with normal LAP. Aortic Valve: Valve structure is normal. Mildly calcified left, right and noncoronary cusps. Mitral Valve: Mild regurgitation. Tricuspid Valve: Mild regurgitation. Left Atrium: Left atrium is moderately dilated. Right Atrium: Right atrium is moderately dilated. Aorta: Normal sized aortic root. Mildly dilated ascending aorta.     Signed by: Ketty Lucero MD on 6/23/2022  4:36 PM CARDIAC ROS:   negative    Past Medical History:   Diagnosis Date    CAD (coronary artery disease) 02/26/2018    NSTEMI    Congestive heart failure ([facility] [address] 02/26/2018    Diabetes ([facility] [address] 02/26/2018    pre-DM    Hypertension        Family History   Problem Relation Age of Onset    Heart Disease Mother     Heart Disease Father 62        MI    Diabetes Sister     Heart Disease Brother 71        CABG    Cancer Maternal Grandmother     Pulmonary Embolism Paternal Grandmother     Heart Disease Paternal Grandfather     Heart Disease Brother 59        CABG    Cancer Brother     Liver Disease Brother         HEPATITIS    No Known Problems Daughter     Other Daughter         KIDNEY ISSUES, UNKNOWN NAME    No Known Problems Daughter     Anesth Problems Neg Hx        Social History     Socioeconomic History    Marital status: SINGLE     Spouse name: Not on file    Number of children: Not on file    Years of education: Not on file    Highest education level: Not on file   Occupational History    Not on file   Tobacco Use    Smoking status: Never    Smokeless tobacco: Never   Substance and Sexual Activity    Alcohol use: No    Drug use: No    Sexual activity: Not on file   Other Topics Concern    Not on file   Social History Narrative    Not on file     Social Determinants of Health     Financial Resource Strain: Not on file   Food Insecurity: Not on file   Transportation Needs: Not on file   Physical Activity: Not on file   Stress: Not on file   Social Connections: Not on file   Intimate Partner Violence: Not on file   Housing Stability: Not on file        GENERAL ROS:  A comprehensive review of systems was negative except for that written in the HPI.     Visit Vitals  BP (!) 160/96   Pulse 95   Resp 16   Ht 5' 4\" (1.626 m)   Wt 76.7 kg (169 lb)   SpO2 95%   BMI 29.01 kg/m²     Vitals:    01/13/23 1337   BP: (!) 160/96   Pulse: 95   Resp: 16   SpO2: 95%   Weight: 76.7 kg (169 lb)   Height: 5' 4\" (1.626 m)        Wt Readings from Last 3 Encounters:   01/13/23 76.7 kg (169 lb)   06/23/22 76.7 kg (169 lb)   06/23/22 78.5 kg (173 lb)            BP Readings from Last 3 Encounters:   01/13/23 (!) 160/96   06/23/22 119/77   06/23/22 (!) 150/80       PHYSICAL EXAM  General appearance: alert, cooperative, no distress, appears stated age  Neck: supple, symmetrical, trachea midline, no adenopathy, thyroid: not enlarged, symmetric, no tenderness/mass/nodules, no carotid bruit, and no JVD  Lungs: clear to auscultation bilaterally  Heart: regular rate and rhythm, S1, S2 normal, no murmur, click, rub or gallop  Extremities: extremities normal, atraumatic, no cyanosis or edema    Lab Results   Component Value Date/Time    Cholesterol, total 286 (H) 02/26/2018 01:27 AM    HDL Cholesterol 76 02/26/2018 01:27 AM    LDL, calculated 185.4 (H) 02/26/2018 01:27 AM    Triglyceride 123 02/26/2018 01:27 AM    CHOL/HDL Ratio 3.8 02/26/2018 01:27 AM       Lab Results   Component Value Date/Time    WBC 9.7 02/02/2019 03:24 AM    Hemoglobin (POC) 12.6 02/26/2018 01:50 AM    HGB 9.0 (L) 02/02/2019 03:24 AM    Hematocrit (POC) 37 02/26/2018 01:50 AM    HCT 28.3 (L) 02/02/2019 03:24 AM    PLATELET 838 18/09/4771 03:24 AM    MCV 98.6 02/02/2019 03:24 AM        Lab Results   Component Value Date/Time    Cholesterol, total 286 (H) 02/26/2018 01:27 AM    HDL Cholesterol 76 02/26/2018 01:27 AM    LDL, calculated 185.4 (H) 02/26/2018 01:27 AM    Triglyceride 123 02/26/2018 01:27 AM    CHOL/HDL Ratio 3.8 02/26/2018 01:27 AM        ASSESSMENT  Diagnoses and all orders for this visit:    1. Hypokalemia    2. Hypertension, unspecified type    3. Acute systolic heart failure ([address]    4. NSTEMI (non-ST elevated myocardial infarction) ([address]    5. LBBB (left bundle branch block)    6. Type 2 diabetes with nephropathy ([facility] [address]    7. Coronary artery disease involving native coronary artery of native heart without angina pectoris    8.  Type 2 diabetes mellitus with other circulatory complication, without long-term current use of insulin ([facility] [address]    9. Dyslipidemia, goal LDL below 70    10. Bilateral carotid artery stenosis       Encounter Diagnoses   Name Primary? Hypokalemia Yes    Hypertension, unspecified type     Acute systolic heart failure (HCC)     NSTEMI (non-ST elevated myocardial infarction) (HCC)     LBBB (left bundle branch block)     Type 2 diabetes with nephropathy ([facility] [address]     Coronary artery disease involving native coronary artery of native heart without angina pectoris     Type 2 diabetes mellitus with other circulatory complication, without long-term current use of insulin (HCC)     Dyslipidemia, goal LDL below 70     Bilateral carotid artery stenosis      No orders of the defined types were placed in this encounter. Tamia Mclean MD  1/13/2023        [address] [place] [address]   [address]  [place] [phone] (F)    [address] [place] [address] Nw  [phone] (P)  [phone] (F)    ATTENTION:   This medical record was transcribed using an electronic medical records/speech recognition system. Although proofread, it may and can contain electronic, spelling and other errors. Corrections may be executed at a later time. Please feel free to contact us for any clarifications as needed.

## 2023-01-13 NOTE — PROGRESS NOTES
Per Dr. Duc Strickland- follow up in 6 months with NP [facility] and then one year with Dr. Duc Strickland.

## 2023-04-25 LAB — HBA1C MFR BLD HPLC: 6.5 %

## 2023-05-03 ENCOUNTER — DOCUMENTATION ONLY (OUTPATIENT)
Dept: CARDIOLOGY CLINIC | Age: 78
End: 2023-05-03

## 2023-05-03 ENCOUNTER — TELEPHONE (OUTPATIENT)
Dept: CARDIOLOGY CLINIC | Age: 78
End: 2023-05-03

## 2023-05-16 RX ORDER — ASPIRIN 81 MG/1
TABLET, COATED ORAL
Qty: 90 TABLET | Refills: 3 | Status: SHIPPED | OUTPATIENT
Start: 2023-05-16

## 2023-05-16 NOTE — TELEPHONE ENCOUNTER
Requested Prescriptions     Signed Prescriptions Disp Refills    ASPIRIN LOW DOSE 81 MG EC tablet 90 tablet 3     Sig: TAKE 1 TABLET BY MOUTH EVERY DAY     Authorizing Provider: Smiley Becerril     Ordering User: Trudy SETWART

## 2023-06-27 ENCOUNTER — OFFICE VISIT (OUTPATIENT)
Age: 78
End: 2023-06-27
Payer: MEDICARE

## 2023-06-27 VITALS
OXYGEN SATURATION: 96 % | SYSTOLIC BLOOD PRESSURE: 138 MMHG | DIASTOLIC BLOOD PRESSURE: 90 MMHG | BODY MASS INDEX: 27.66 KG/M2 | HEIGHT: 64 IN | HEART RATE: 85 BPM | WEIGHT: 162 LBS | RESPIRATION RATE: 16 BRPM

## 2023-06-27 DIAGNOSIS — E78.2 MIXED HYPERLIPIDEMIA: ICD-10-CM

## 2023-06-27 DIAGNOSIS — I44.7 LEFT BUNDLE-BRANCH BLOCK, UNSPECIFIED: ICD-10-CM

## 2023-06-27 DIAGNOSIS — I25.10 ATHEROSCLEROSIS OF NATIVE CORONARY ARTERY OF NATIVE HEART WITHOUT ANGINA PECTORIS: Primary | ICD-10-CM

## 2023-06-27 DIAGNOSIS — I10 ESSENTIAL (PRIMARY) HYPERTENSION: ICD-10-CM

## 2023-06-27 DIAGNOSIS — I34.0 NONRHEUMATIC MITRAL VALVE REGURGITATION: ICD-10-CM

## 2023-06-27 DIAGNOSIS — I50.32 CHRONIC DIASTOLIC (CONGESTIVE) HEART FAILURE (HCC): ICD-10-CM

## 2023-06-27 PROCEDURE — 3075F SYST BP GE 130 - 139MM HG: CPT | Performed by: NURSE PRACTITIONER

## 2023-06-27 PROCEDURE — 99214 OFFICE O/P EST MOD 30 MIN: CPT | Performed by: NURSE PRACTITIONER

## 2023-06-27 PROCEDURE — G8400 PT W/DXA NO RESULTS DOC: HCPCS | Performed by: NURSE PRACTITIONER

## 2023-06-27 PROCEDURE — 1036F TOBACCO NON-USER: CPT | Performed by: NURSE PRACTITIONER

## 2023-06-27 PROCEDURE — G8419 CALC BMI OUT NRM PARAM NOF/U: HCPCS | Performed by: NURSE PRACTITIONER

## 2023-06-27 PROCEDURE — 1090F PRES/ABSN URINE INCON ASSESS: CPT | Performed by: NURSE PRACTITIONER

## 2023-06-27 PROCEDURE — 1123F ACP DISCUSS/DSCN MKR DOCD: CPT | Performed by: NURSE PRACTITIONER

## 2023-06-27 PROCEDURE — G8428 CUR MEDS NOT DOCUMENT: HCPCS | Performed by: NURSE PRACTITIONER

## 2023-06-27 PROCEDURE — 93005 ELECTROCARDIOGRAM TRACING: CPT | Performed by: NURSE PRACTITIONER

## 2023-06-27 PROCEDURE — 3080F DIAST BP >= 90 MM HG: CPT | Performed by: NURSE PRACTITIONER

## 2023-06-27 PROCEDURE — 93010 ELECTROCARDIOGRAM REPORT: CPT | Performed by: NURSE PRACTITIONER

## 2023-06-27 RX ORDER — EZETIMIBE 10 MG/1
10 TABLET ORAL DAILY
Qty: 30 TABLET | Refills: 3 | Status: SHIPPED | OUTPATIENT
Start: 2023-06-27

## 2023-08-03 RX ORDER — LOSARTAN POTASSIUM 25 MG/1
TABLET ORAL
Qty: 90 TABLET | Refills: 3 | Status: SHIPPED | OUTPATIENT
Start: 2023-08-03

## 2023-08-03 NOTE — TELEPHONE ENCOUNTER
Per verbal order from Dr. Ascencion Jensen  Last appt: 6/27/2023     Future Appointments   Date Time Provider [address]   1/19/2024  8:00 AM [facility] 1 ALVARO WARD   1/19/2024  9:00 AM MD ALVARO Hathaway       Requested Prescriptions     Signed Prescriptions Disp Refills    losartan (COZAAR) 25 MG tablet 90 tablet 3     Sig: TAKE 1 TABLET BY MOUTH EVERY DAY     Authorizing Provider: Cristofer Smith     Ordering User: Joanna Garcia

## 2023-08-18 RX ORDER — FUROSEMIDE 40 MG/1
TABLET ORAL
Qty: 90 TABLET | Refills: 3 | Status: SHIPPED | OUTPATIENT
Start: 2023-08-18

## 2023-08-18 NOTE — TELEPHONE ENCOUNTER
Neal Alaniz N.P.-  Last appt: 6/27/2023     Future Appointments   Date Time Provider [address]   1/19/2024  8:00 AM [facility] 1 ALVARO WARD   1/19/2024  9:00 AM MD ALVARO Cox       Requested Prescriptions     Signed Prescriptions Disp Refills    furosemide (LASIX) 40 MG tablet 90 tablet 3     Sig: TAKE 1 TABLET BY MOUTH EVERY DAY     Authorizing Provider: Karla Tate     Ordering User: Michelle STEWART

## 2023-08-23 RX ORDER — METOPROLOL SUCCINATE 25 MG/1
TABLET, EXTENDED RELEASE ORAL NIGHTLY
Qty: 90 TABLET | Refills: 1 | Status: SHIPPED | OUTPATIENT
Start: 2023-08-23

## 2023-08-23 NOTE — TELEPHONE ENCOUNTER
Per verbal order from Dr. Sangita Roberson  Last appt: 6/27/2023     Future Appointments   Date Time Provider [address]   1/19/2024  8:00 AM [facility] 1 ALVARO WARD   1/19/2024  9:00 AM MD ALVARO Copeland       Requested Prescriptions     Signed Prescriptions Disp Refills    metoprolol succinate (TOPROL XL) 25 MG extended release tablet 90 tablet 1     Sig: TAKE 1 TABLET BY MOUTH NIGHTLY     Authorizing Provider: You Alvarado     Ordering User: Aryan Villagran

## 2023-09-20 RX ORDER — EZETIMIBE 10 MG/1
10 TABLET ORAL DAILY
Qty: 90 TABLET | Refills: 1 | Status: SHIPPED | OUTPATIENT
Start: 2023-09-20

## 2023-09-20 NOTE — TELEPHONE ENCOUNTER
Requested Prescriptions     Signed Prescriptions Disp Refills    ezetimibe (ZETIA) 10 MG tablet 90 tablet 1     Sig: TAKE 1 TABLET BY MOUTH EVERY DAY     Authorizing Provider: Shaquille Lassiter     Ordering User: Dee FRY per NP     Future Appointments   Date Time Provider [address]   1/19/2024  8:00 AM [facility] 1 ALVARO WARD   1/19/2024  9:00 AM MD ALVARO Pryor AMB

## 2023-10-26 LAB — HBA1C MFR BLD HPLC: 6.8 %

## 2023-11-29 RX ORDER — CLOPIDOGREL BISULFATE 75 MG/1
75 TABLET ORAL DAILY
Qty: 90 TABLET | Refills: 1 | Status: SHIPPED | OUTPATIENT
Start: 2023-11-29

## 2023-11-29 NOTE — TELEPHONE ENCOUNTER
Requested Prescriptions     Signed Prescriptions Disp Refills    clopidogrel (PLAVIX) 75 MG tablet 90 tablet 1     Sig: TAKE 1 TABLET BY MOUTH EVERY DAY     Authorizing Provider: Leonela Dodge     Ordering User: Luiz Astorga     Per Verbal order per LUIS DANIEL Farah    Future Appointments   Date Time Provider [address] [place] [address]   1/19/2024  8:00 AM [facility] ECHO 1 ALVARO WARD   1/19/2024  9:00 AM MD ALVARO Weaver AMB

## 2023-11-30 ENCOUNTER — TELEPHONE (OUTPATIENT)
Age: 78
End: 2023-11-30

## 2023-11-30 NOTE — TELEPHONE ENCOUNTER
[facility] from 10/25/23 that were ordered by PCP were brought to the office. CMP abnormals:  Glucose 125  Creatinine 1.18  GFR 48   Sodium 131  Chloride 93   Alkaline phosphatase 128    Lipid:     Total Chol-179  Triglycerides- 124  HDL 70  VLDL 22  LDL 87    A1C-6.8    CBC all within normal limits

## 2024-01-19 ENCOUNTER — ANCILLARY PROCEDURE (OUTPATIENT)
Age: 79
End: 2024-01-19
Payer: MEDICARE

## 2024-01-19 ENCOUNTER — OFFICE VISIT (OUTPATIENT)
Age: 79
End: 2024-01-19
Payer: MEDICARE

## 2024-01-19 VITALS
HEART RATE: 79 BPM | OXYGEN SATURATION: 97 % | SYSTOLIC BLOOD PRESSURE: 162 MMHG | HEIGHT: 64 IN | WEIGHT: 157 LBS | DIASTOLIC BLOOD PRESSURE: 90 MMHG | BODY MASS INDEX: 26.8 KG/M2

## 2024-01-19 VITALS
WEIGHT: 157 LBS | SYSTOLIC BLOOD PRESSURE: 138 MMHG | HEIGHT: 64 IN | BODY MASS INDEX: 26.8 KG/M2 | DIASTOLIC BLOOD PRESSURE: 90 MMHG

## 2024-01-19 DIAGNOSIS — I44.7 LEFT BUNDLE-BRANCH BLOCK, UNSPECIFIED: ICD-10-CM

## 2024-01-19 DIAGNOSIS — I10 ESSENTIAL (PRIMARY) HYPERTENSION: ICD-10-CM

## 2024-01-19 DIAGNOSIS — I34.0 NONRHEUMATIC MITRAL VALVE REGURGITATION: ICD-10-CM

## 2024-01-19 DIAGNOSIS — I50.21 ACUTE SYSTOLIC (CONGESTIVE) HEART FAILURE (HCC): ICD-10-CM

## 2024-01-19 DIAGNOSIS — I25.10 ATHEROSCLEROSIS OF NATIVE CORONARY ARTERY OF NATIVE HEART WITHOUT ANGINA PECTORIS: ICD-10-CM

## 2024-01-19 DIAGNOSIS — E11.69 TYPE 2 DIABETES MELLITUS WITH OTHER SPECIFIED COMPLICATION, WITHOUT LONG-TERM CURRENT USE OF INSULIN (HCC): ICD-10-CM

## 2024-01-19 DIAGNOSIS — E78.5 HYPERLIPIDEMIA, UNSPECIFIED HYPERLIPIDEMIA TYPE: Primary | ICD-10-CM

## 2024-01-19 DIAGNOSIS — I50.32 CHRONIC DIASTOLIC (CONGESTIVE) HEART FAILURE (HCC): ICD-10-CM

## 2024-01-19 DIAGNOSIS — I35.0 AORTIC STENOSIS, MILD: ICD-10-CM

## 2024-01-19 LAB
ECHO AO ASC DIAM: 4 CM
ECHO AO ASCENDING AORTA INDEX: 2.27 CM/M2
ECHO AO ROOT DIAM: 3.2 CM
ECHO AO ROOT INDEX: 1.82 CM/M2
ECHO AV AREA PEAK VELOCITY: 1.5 CM2
ECHO AV AREA VTI: 1.5 CM2
ECHO AV AREA/BSA PEAK VELOCITY: 0.9 CM2/M2
ECHO AV AREA/BSA VTI: 0.9 CM2/M2
ECHO AV MEAN GRADIENT: 8 MMHG
ECHO AV MEAN VELOCITY: 1.4 M/S
ECHO AV PEAK GRADIENT: 15 MMHG
ECHO AV PEAK VELOCITY: 1.9 M/S
ECHO AV VELOCITY RATIO: 0.32
ECHO AV VTI: 42.9 CM
ECHO BSA: 1.79 M2
ECHO EST RA PRESSURE: 3 MMHG
ECHO LA DIAMETER INDEX: 2.95 CM/M2
ECHO LA DIAMETER: 5.2 CM
ECHO LA TO AORTIC ROOT RATIO: 1.63
ECHO LA VOL A-L A2C: 62 ML (ref 22–52)
ECHO LA VOL A-L A4C: 60 ML (ref 22–52)
ECHO LA VOL BP: 64 ML (ref 22–52)
ECHO LA VOL MOD A2C: 59 ML (ref 22–52)
ECHO LA VOL MOD A4C: 59 ML (ref 22–52)
ECHO LA VOL/BSA BIPLANE: 36 ML/M2 (ref 16–34)
ECHO LA VOLUME AREA LENGTH: 66 ML
ECHO LA VOLUME INDEX A-L A2C: 35 ML/M2 (ref 16–34)
ECHO LA VOLUME INDEX A-L A4C: 34 ML/M2 (ref 16–34)
ECHO LA VOLUME INDEX AREA LENGTH: 38 ML/M2 (ref 16–34)
ECHO LA VOLUME INDEX MOD A2C: 34 ML/M2 (ref 16–34)
ECHO LA VOLUME INDEX MOD A4C: 34 ML/M2 (ref 16–34)
ECHO LV E' LATERAL VELOCITY: 10 CM/S
ECHO LV E' SEPTAL VELOCITY: 6 CM/S
ECHO LV EDV A2C: 85 ML
ECHO LV EDV A4C: 84 ML
ECHO LV EDV BP: 86 ML (ref 56–104)
ECHO LV EDV INDEX A4C: 48 ML/M2
ECHO LV EDV INDEX BP: 49 ML/M2
ECHO LV EDV NDEX A2C: 48 ML/M2
ECHO LV EJECTION FRACTION A2C: 52 %
ECHO LV EJECTION FRACTION A4C: 49 %
ECHO LV EJECTION FRACTION BIPLANE: 51 % (ref 55–100)
ECHO LV ESV A2C: 41 ML
ECHO LV ESV A4C: 42 ML
ECHO LV ESV BP: 42 ML (ref 19–49)
ECHO LV ESV INDEX A2C: 23 ML/M2
ECHO LV ESV INDEX A4C: 24 ML/M2
ECHO LV ESV INDEX BP: 24 ML/M2
ECHO LV FRACTIONAL SHORTENING: 27 % (ref 28–44)
ECHO LV INTERNAL DIMENSION DIASTOLE INDEX: 3.13 CM/M2
ECHO LV INTERNAL DIMENSION DIASTOLIC: 5.5 CM (ref 3.9–5.3)
ECHO LV INTERNAL DIMENSION SYSTOLIC INDEX: 2.27 CM/M2
ECHO LV INTERNAL DIMENSION SYSTOLIC: 4 CM
ECHO LV IVSD: 1.3 CM (ref 0.6–0.9)
ECHO LV MASS 2D: 304.3 G (ref 67–162)
ECHO LV MASS INDEX 2D: 172.9 G/M2 (ref 43–95)
ECHO LV POSTERIOR WALL DIASTOLIC: 1.3 CM (ref 0.6–0.9)
ECHO LV RELATIVE WALL THICKNESS RATIO: 0.47
ECHO LVOT AREA: 4.5 CM2
ECHO LVOT AV VTI INDEX: 0.35
ECHO LVOT DIAM: 2.4 CM
ECHO LVOT MEAN GRADIENT: 1 MMHG
ECHO LVOT PEAK GRADIENT: 2 MMHG
ECHO LVOT PEAK VELOCITY: 0.6 M/S
ECHO LVOT STROKE VOLUME INDEX: 39.1 ML/M2
ECHO LVOT SV: 68.7 ML
ECHO LVOT VTI: 15.2 CM
ECHO MV A VELOCITY: 0.73 M/S
ECHO MV E DECELERATION TIME (DT): 211.2 MS
ECHO MV E VELOCITY: 0.51 M/S
ECHO MV E/A RATIO: 0.7
ECHO MV E/E' LATERAL: 5.1
ECHO MV E/E' RATIO (AVERAGED): 6.8
ECHO RIGHT VENTRICULAR SYSTOLIC PRESSURE (RVSP): 36 MMHG
ECHO RV BASAL DIMENSION: 3.2 CM
ECHO RV FREE WALL PEAK S': 15 CM/S
ECHO RV TAPSE: 2 CM (ref 1.7–?)
ECHO TV REGURGITANT MAX VELOCITY: 2.87 M/S
ECHO TV REGURGITANT PEAK GRADIENT: 33 MMHG

## 2024-01-19 PROCEDURE — G8400 PT W/DXA NO RESULTS DOC: HCPCS | Performed by: INTERNAL MEDICINE

## 2024-01-19 PROCEDURE — 1090F PRES/ABSN URINE INCON ASSESS: CPT | Performed by: INTERNAL MEDICINE

## 2024-01-19 PROCEDURE — G8427 DOCREV CUR MEDS BY ELIG CLIN: HCPCS | Performed by: INTERNAL MEDICINE

## 2024-01-19 PROCEDURE — 1123F ACP DISCUSS/DSCN MKR DOCD: CPT | Performed by: INTERNAL MEDICINE

## 2024-01-19 PROCEDURE — 99214 OFFICE O/P EST MOD 30 MIN: CPT | Performed by: INTERNAL MEDICINE

## 2024-01-19 PROCEDURE — 93306 TTE W/DOPPLER COMPLETE: CPT | Performed by: INTERNAL MEDICINE

## 2024-01-19 PROCEDURE — 3080F DIAST BP >= 90 MM HG: CPT | Performed by: INTERNAL MEDICINE

## 2024-01-19 PROCEDURE — 3077F SYST BP >= 140 MM HG: CPT | Performed by: INTERNAL MEDICINE

## 2024-01-19 PROCEDURE — G8419 CALC BMI OUT NRM PARAM NOF/U: HCPCS | Performed by: INTERNAL MEDICINE

## 2024-01-19 PROCEDURE — 1036F TOBACCO NON-USER: CPT | Performed by: INTERNAL MEDICINE

## 2024-01-19 PROCEDURE — G8484 FLU IMMUNIZE NO ADMIN: HCPCS | Performed by: INTERNAL MEDICINE

## 2024-01-19 RX ORDER — LOSARTAN POTASSIUM 50 MG/1
50 TABLET ORAL DAILY
Qty: 90 TABLET | Refills: 3 | Status: SHIPPED | OUTPATIENT
Start: 2024-01-19

## 2024-01-19 NOTE — PROGRESS NOTES
Per Dr Bazzi- follow up 6 months.  
Left atrium is moderately dilated.    Right Atrium: Right atrium is moderately dilated.    Aorta: Normal sized aortic root. Mildly dilated ascending aorta.    Signed by: Rae Bazzi MD on 6/23/2022  4:35 PM, Signed by: Unknown Provider Result on 6/23/2022 12:00 AM          Future Appointments   Date Time Provider Department Center   7/19/2024  9:00 AM Rae Bazzi MD [facility]       Rae Bazzi MD    [facility]  [facility]    [id number] [place] [address], [address]    [place] [address]    Ph: [phone]

## 2024-02-22 RX ORDER — METOPROLOL SUCCINATE 25 MG/1
25 TABLET, EXTENDED RELEASE ORAL NIGHTLY
Qty: 90 TABLET | Refills: 3 | Status: SHIPPED | OUTPATIENT
Start: 2024-02-22

## 2024-02-22 NOTE — TELEPHONE ENCOUNTER
Per verbal order from Dr. Sharron Payan  Last appt: 1/19/2024     Future Appointments   Date Time Provider Department Center   7/19/2024  9:00 AM Sharron Payan, MD [facility] AMB       Requested Prescriptions     Signed Prescriptions Disp Refills    metoprolol succinate (TOPROL XL) 25 MG extended release tablet 90 tablet 3     Sig: TAKE 1 TABLET BY MOUTH EVERY DAY AT NIGHT     Authorizing Provider: SHARRON PAYAN     Ordering User: ROSSY STEWART

## 2024-03-18 RX ORDER — EZETIMIBE 10 MG/1
10 TABLET ORAL DAILY
Qty: 90 TABLET | Refills: 3 | Status: SHIPPED | OUTPATIENT
Start: 2024-03-18

## 2024-03-18 NOTE — TELEPHONE ENCOUNTER
Per verbal order from Dr. Sharron Payan  Last appt: 1/19/2024     Future Appointments   Date Time Provider Department Center   7/19/2024  9:00 AM Sharron Payan, MD [facility]       Requested Prescriptions     Signed Prescriptions Disp Refills    ezetimibe (ZETIA) 10 MG tablet 90 tablet 3     Sig: TAKE 1 TABLET BY MOUTH EVERY DAY     Authorizing Provider: SHARRON PAYAN     Ordering User: ROSSY STEWART

## 2024-04-15 RX ORDER — ASPIRIN 81 MG/1
TABLET, COATED ORAL
Qty: 90 TABLET | Refills: 3 | Status: SHIPPED | OUTPATIENT
Start: 2024-04-15

## 2024-04-15 RX ORDER — CLOPIDOGREL BISULFATE 75 MG/1
75 TABLET ORAL DAILY
Qty: 90 TABLET | Refills: 1 | Status: SHIPPED | OUTPATIENT
Start: 2024-04-15

## 2024-04-15 NOTE — TELEPHONE ENCOUNTER
Requested Prescriptions     Signed Prescriptions Disp Refills    clopidogrel (PLAVIX) 75 MG tablet 90 tablet 1     Sig: TAKE 1 TABLET BY MOUTH EVERY DAY     Authorizing Provider: XOCHILT CAVANAUGH     Ordering User: LEXI RIBEIRO    ASPIRIN LOW DOSE 81 MG EC tablet 90 tablet 3     Sig: TAKE 1 TABLET BY MOUTH EVERY DAY     Authorizing Provider: XOCHILT CAVANAUGH     Ordering User: LEXI RIBEIRO     VO per NP     Future Appointments   Date Time Provider Department Center   7/19/2024  9:00 AM Rae Bazzi MD [facility]

## 2024-05-08 ENCOUNTER — CLINICAL DOCUMENTATION (OUTPATIENT)
Age: 79
End: 2024-05-08

## 2024-05-08 NOTE — PROGRESS NOTES
Labs drawn by PCP office receieved via fax; MD reviewed, signed and sent to scanning (see media section).

## 2024-07-08 RX ORDER — POTASSIUM CHLORIDE 1500 MG/1
TABLET, EXTENDED RELEASE ORAL
Qty: 45 TABLET | Refills: 1 | Status: SHIPPED | OUTPATIENT
Start: 2024-07-08

## 2024-07-08 NOTE — TELEPHONE ENCOUNTER
Per verbal order from Dr. Sharron Payan  Last appt: 1/19/2024     Future Appointments   Date Time Provider Department Center   8/22/2024  9:00 AM Sharron Payan, MD [facility]       Requested Prescriptions     Signed Prescriptions Disp Refills    KLOR-CON M20 20 MEQ extended release tablet 45 tablet 1     Sig: TAKE 1/2 (HALF) A TABLET BY MOUTH EVERY DAY     Authorizing Provider: SAHRRON PAYAN     Ordering User: ROSSY STEWART potassium level 4.5 on 4/25/24 via [facility] labs scanned in media (normal range 3.5-5.2)

## 2024-08-22 ENCOUNTER — OFFICE VISIT (OUTPATIENT)
Age: 79
End: 2024-08-22
Payer: MEDICARE

## 2024-08-22 VITALS
SYSTOLIC BLOOD PRESSURE: 178 MMHG | WEIGHT: 162.8 LBS | DIASTOLIC BLOOD PRESSURE: 86 MMHG | HEART RATE: 84 BPM | HEIGHT: 64 IN | BODY MASS INDEX: 27.79 KG/M2 | OXYGEN SATURATION: 96 %

## 2024-08-22 DIAGNOSIS — I50.22 CHRONIC SYSTOLIC HEART FAILURE (HCC): ICD-10-CM

## 2024-08-22 DIAGNOSIS — I10 ESSENTIAL (PRIMARY) HYPERTENSION: Primary | ICD-10-CM

## 2024-08-22 DIAGNOSIS — I35.0 AORTIC STENOSIS, MILD: ICD-10-CM

## 2024-08-22 DIAGNOSIS — I25.10 CORONARY ARTERY DISEASE INVOLVING NATIVE CORONARY ARTERY OF NATIVE HEART WITHOUT ANGINA PECTORIS: ICD-10-CM

## 2024-08-22 DIAGNOSIS — E78.5 HYPERLIPIDEMIA, UNSPECIFIED HYPERLIPIDEMIA TYPE: ICD-10-CM

## 2024-08-22 DIAGNOSIS — E11.69 TYPE 2 DIABETES MELLITUS WITH OTHER SPECIFIED COMPLICATION, WITHOUT LONG-TERM CURRENT USE OF INSULIN (HCC): ICD-10-CM

## 2024-08-22 DIAGNOSIS — I44.7 LEFT BUNDLE-BRANCH BLOCK, UNSPECIFIED: ICD-10-CM

## 2024-08-22 PROCEDURE — 99214 OFFICE O/P EST MOD 30 MIN: CPT | Performed by: INTERNAL MEDICINE

## 2024-08-22 PROCEDURE — G8427 DOCREV CUR MEDS BY ELIG CLIN: HCPCS | Performed by: INTERNAL MEDICINE

## 2024-08-22 PROCEDURE — 3077F SYST BP >= 140 MM HG: CPT | Performed by: INTERNAL MEDICINE

## 2024-08-22 PROCEDURE — G8419 CALC BMI OUT NRM PARAM NOF/U: HCPCS | Performed by: INTERNAL MEDICINE

## 2024-08-22 PROCEDURE — 1123F ACP DISCUSS/DSCN MKR DOCD: CPT | Performed by: INTERNAL MEDICINE

## 2024-08-22 PROCEDURE — 1036F TOBACCO NON-USER: CPT | Performed by: INTERNAL MEDICINE

## 2024-08-22 PROCEDURE — 1090F PRES/ABSN URINE INCON ASSESS: CPT | Performed by: INTERNAL MEDICINE

## 2024-08-22 PROCEDURE — G8400 PT W/DXA NO RESULTS DOC: HCPCS | Performed by: INTERNAL MEDICINE

## 2024-08-22 PROCEDURE — 3079F DIAST BP 80-89 MM HG: CPT | Performed by: INTERNAL MEDICINE

## 2024-08-22 RX ORDER — FUROSEMIDE 40 MG/1
20 TABLET ORAL DAILY
Qty: 45 TABLET | Refills: 3 | Status: SHIPPED | OUTPATIENT
Start: 2024-08-22

## 2024-08-22 ASSESSMENT — PATIENT HEALTH QUESTIONNAIRE - PHQ9
1. LITTLE INTEREST OR PLEASURE IN DOING THINGS: NOT AT ALL
SUM OF ALL RESPONSES TO PHQ QUESTIONS 1-9: 0
SUM OF ALL RESPONSES TO PHQ9 QUESTIONS 1 & 2: 0
2. FEELING DOWN, DEPRESSED OR HOPELESS: NOT AT ALL

## 2024-08-22 NOTE — TELEPHONE ENCOUNTER
Per verbal order from Dr. Sharron Payan  Last appt: 1/19/2024     Future Appointments   Date Time Provider Department Center   11/19/2024 10:20 AM Rosa Guido APRN - NP [facility]   2/6/2025 10:40 AM Sharron Payan, MD [facility]       Requested Prescriptions     Signed Prescriptions Disp Refills    furosemide (LASIX) 40 MG tablet 45 tablet 3     Sig: Take 0.5 tablets by mouth daily     Authorizing Provider: SHARRON PAYAN     Ordering User: ROSSY STEWART

## 2024-08-22 NOTE — PROGRESS NOTES
Patient: Lizz Paul  : 1945    Primary Cardiologist: Rae Bazzi MD  EP Cardiologist:  PCP: Jose Mei MD    Today's Date: 2024      ASSESSMENT AND PLAN:         ICD-10-CM    1. Essential (primary) hypertension  I10       2. Hyperlipidemia, unspecified hyperlipidemia type  E78.5       3. Left bundle-branch block, unspecified  I44.7       4. Aortic stenosis, mild  I35.0       5. Type 2 diabetes mellitus with other specified complication, without long-term current use of insulin (HCC)  E11.69           HISTORY OF PRESENT ILLNESS:     History of Present Illness:  Lizz Paul is a 79 y.o. female here for FU.      1.  NSTEMI/CAD -  - troponin peaked at 2.4, cardiac cath revealed significant RCA disease with collaterals and other non-obstructive CAD, no PCI needed, managing CAD medically   -continue ASA 81mg daily, clopidogrel, Toprol XL, statin + zetia   -gave her Rx for NTG SL tabs PRN chest pain and instructed on use     2.  Chronic systolic heart failure  EF was <20%  Echo 2023 45%  BP not well controlled  Stop losartan start entresto  FU Idalmis CARY in 3 months for BP check, increase entresto if needed   - LVEF 15-20% by initial TTE , then EF was 35-40%, EF is low normal on TTE   -NYHA Class I-II    -did not like entresto and will retrial now   -declined ICD, found the idea extremely stressful and even caused tears discussing it, DNR in place   -now her EF is low normal so ICD is not indicated    -split FU between Idalmis CARY and myself     3.  Dyslipidemia -  on atorvastatin 80mg daily, LDL goal < 70, now on rosuvastatin 40mg daily, will check lipids in 1 month, rosuvastatin has a large copay for her so will need to decide whether the cost of rosuvastatin is justified by the change  in her LDL, advised her to increase her fish oil to 2 caps daily    -could not get zetia in the past - is taking now, but cost is >$100  -Discussed PCSK9 given CAD, carotid disease,

## 2024-11-08 RX ORDER — CLOPIDOGREL BISULFATE 75 MG/1
75 TABLET ORAL DAILY
Qty: 90 TABLET | Refills: 1 | Status: SHIPPED | OUTPATIENT
Start: 2024-11-08

## 2024-11-08 NOTE — TELEPHONE ENCOUNTER
Per verbal order from Dr. Sharron Payan  Last appt: 8/22/2024     Future Appointments   Date Time Provider Department Center   11/20/2024 10:20 AM Rosa Guido APRN - NP [facility]   2/6/2025 10:40 AM Sharron Payna, MD [facility]       Requested Prescriptions     Signed Prescriptions Disp Refills    clopidogrel (PLAVIX) 75 MG tablet 90 tablet 1     Sig: TAKE 1 TABLET BY MOUTH EVERY DAY     Authorizing Provider: SHARRON PAYAN     Ordering User: ROSSY STEWART

## 2024-11-20 ENCOUNTER — OFFICE VISIT (OUTPATIENT)
Age: 79
End: 2024-11-20
Payer: MEDICARE

## 2024-11-20 VITALS
HEIGHT: 64 IN | OXYGEN SATURATION: 99 % | SYSTOLIC BLOOD PRESSURE: 132 MMHG | HEART RATE: 56 BPM | WEIGHT: 164 LBS | DIASTOLIC BLOOD PRESSURE: 80 MMHG | BODY MASS INDEX: 28 KG/M2

## 2024-11-20 DIAGNOSIS — I50.22 HEART FAILURE WITH MID-RANGE EJECTION FRACTION (HFMEF) (HCC): ICD-10-CM

## 2024-11-20 DIAGNOSIS — I35.0 NONRHEUMATIC AORTIC VALVE STENOSIS: ICD-10-CM

## 2024-11-20 DIAGNOSIS — I65.23 BILATERAL CAROTID ARTERY STENOSIS: ICD-10-CM

## 2024-11-20 DIAGNOSIS — I10 ESSENTIAL (PRIMARY) HYPERTENSION: ICD-10-CM

## 2024-11-20 DIAGNOSIS — E78.2 MIXED HYPERLIPIDEMIA: ICD-10-CM

## 2024-11-20 DIAGNOSIS — I25.10 CORONARY ARTERY DISEASE INVOLVING NATIVE CORONARY ARTERY OF NATIVE HEART WITHOUT ANGINA PECTORIS: Primary | ICD-10-CM

## 2024-11-20 PROCEDURE — 1126F AMNT PAIN NOTED NONE PRSNT: CPT | Performed by: NURSE PRACTITIONER

## 2024-11-20 PROCEDURE — 3075F SYST BP GE 130 - 139MM HG: CPT | Performed by: NURSE PRACTITIONER

## 2024-11-20 PROCEDURE — G8427 DOCREV CUR MEDS BY ELIG CLIN: HCPCS | Performed by: NURSE PRACTITIONER

## 2024-11-20 PROCEDURE — G8484 FLU IMMUNIZE NO ADMIN: HCPCS | Performed by: NURSE PRACTITIONER

## 2024-11-20 PROCEDURE — 1036F TOBACCO NON-USER: CPT | Performed by: NURSE PRACTITIONER

## 2024-11-20 PROCEDURE — 1159F MED LIST DOCD IN RCRD: CPT | Performed by: NURSE PRACTITIONER

## 2024-11-20 PROCEDURE — 1090F PRES/ABSN URINE INCON ASSESS: CPT | Performed by: NURSE PRACTITIONER

## 2024-11-20 PROCEDURE — 1123F ACP DISCUSS/DSCN MKR DOCD: CPT | Performed by: NURSE PRACTITIONER

## 2024-11-20 PROCEDURE — 3079F DIAST BP 80-89 MM HG: CPT | Performed by: NURSE PRACTITIONER

## 2024-11-20 PROCEDURE — G8400 PT W/DXA NO RESULTS DOC: HCPCS | Performed by: NURSE PRACTITIONER

## 2024-11-20 PROCEDURE — 99214 OFFICE O/P EST MOD 30 MIN: CPT | Performed by: NURSE PRACTITIONER

## 2024-11-20 PROCEDURE — G8419 CALC BMI OUT NRM PARAM NOF/U: HCPCS | Performed by: NURSE PRACTITIONER

## 2024-11-20 RX ORDER — METOPROLOL SUCCINATE 25 MG/1
25 TABLET, EXTENDED RELEASE ORAL EVERY EVENING
Qty: 90 TABLET | Refills: 1 | Status: SHIPPED | OUTPATIENT
Start: 2024-11-20

## 2024-11-20 RX ORDER — LOSARTAN POTASSIUM 50 MG/1
50 TABLET ORAL DAILY
Qty: 90 TABLET | Refills: 1 | Status: SHIPPED | OUTPATIENT
Start: 2024-11-20

## 2024-11-20 ASSESSMENT — PATIENT HEALTH QUESTIONNAIRE - PHQ9
2. FEELING DOWN, DEPRESSED OR HOPELESS: NOT AT ALL
1. LITTLE INTEREST OR PLEASURE IN DOING THINGS: NOT AT ALL
SUM OF ALL RESPONSES TO PHQ QUESTIONS 1-9: 0
SUM OF ALL RESPONSES TO PHQ9 QUESTIONS 1 & 2: 0
SUM OF ALL RESPONSES TO PHQ QUESTIONS 1-9: 0

## 2024-11-20 NOTE — PROGRESS NOTES
Patient: Lizz Paul  : 1945    Primary Cardiologist: Rosa Guido, APRN - NP  EP Cardiologist:  PCP: Jose Mei MD    Today's Date: 2024    ASSESSMENT AND PLAN:       ICD-10-CM    1. Coronary artery disease involving native coronary artery of native heart without angina pectoris  I25.10 CANCELED: CBC      2. Mixed hyperlipidemia  E78.2 Lipid Panel      3. Essential (primary) hypertension  I10 Comprehensive Metabolic Panel      4. Heart failure with mid-range ejection fraction (HFmEF) ([facility])  I50.22          1.  Hx of NSTEMI in 2018/CAD - troponin peaked at 2.4, cardiac cath revealed significant RCA disease with collaterals and other non-obstructive CAD, no PCI needed, managing CAD medically   -continue ASA 81mg daily, Toprol XL, statin + zetia   -Has Rx for NTG SL tabs PRN chest pain      2.  HFmrEF  LVEF 15-20% by initial TTE   Echo 2023 showed EF 45%, echo  showed EF 45-50%  Cannot afford entresto so will begin losartan 50mg daily after she runs out of entresto  Continue Toprol XL   Likely not able to afford jardiance, may consider spironolactone at next visit   Declined ICD in the past, found the idea extremely stressful and even caused tears discussing it  Did not discuss DNR status today  She will follow up with Dr. Bazzi in 2025     3.  Dyslipidemia - on atorvastatin 80mg daily and zetia, LDL goal < 70  Unable to afford rosuvastatin  Previously discussed PCSK9 given CAD, carotid disease, LDL not at goal  - she is not receptive to this at this time.    Will check fasting lipids prior to next visit      4.  HTN   BP well controlled, continue toprol XL and transition entresto to losartan as above      5.  DM Type 2 - management per PCP     6.  LBBB - since       7.  Hypokalemia - continue KCL 10meq daily, will check CMP prior to next visit      8.  PAD/carotid disease  - has annual visits with vascular surgery, hx of right CEA, + left carotid

## 2024-11-20 NOTE — PATIENT INSTRUCTIONS
Please have fasting labs drawn a few days prior to your follow up appointment with Dr. Bazzi    When you run out of entresto you may stop it and begin taking losartan 50mg daily the following day

## 2024-12-05 ENCOUNTER — CLINICAL DOCUMENTATION (OUTPATIENT)
Age: 79
End: 2024-12-05

## 2024-12-06 DIAGNOSIS — E78.5 HYPERLIPIDEMIA, UNSPECIFIED: ICD-10-CM

## 2024-12-06 RX ORDER — ATORVASTATIN CALCIUM 80 MG/1
80 TABLET, FILM COATED ORAL NIGHTLY
Qty: 90 TABLET | Refills: 1 | Status: SHIPPED | OUTPATIENT
Start: 2024-12-06

## 2024-12-16 ENCOUNTER — TELEPHONE (OUTPATIENT)
Age: 79
End: 2024-12-16

## 2024-12-16 NOTE — TELEPHONE ENCOUNTER
Spoke with patient after ID x2 to convey results msg below per NP Idalmis.      Patient would like to finish up her existing zetia supply but is agreeable to switch.  Nexlizet prescription sent to pharmacy\.

## 2024-12-16 NOTE — TELEPHONE ENCOUNTER
Please call patient and let her know I got her lipid results from [facility]  Her LDL is 98 and still too high for her hx of CAD  LDL goal is <70  I would recommend she stop zetia and begin nexlizet to lower her cholesterol  Continue taking atorvastatin  If she is agreeable please send Rx for nexlizet one tab daily to her pharmacy    Future Appointments   Date Time Provider Department Center   1/30/2025 10:00 AM [facility] 4 [facility] W[facility]   2/6/2025 10:40 AM Rae Bazzi MD [facility]

## 2024-12-30 RX ORDER — POTASSIUM CHLORIDE 1500 MG/1
TABLET, EXTENDED RELEASE ORAL
Qty: 45 TABLET | Refills: 1 | Status: SHIPPED | OUTPATIENT
Start: 2024-12-30

## 2025-01-03 RX ORDER — ASPIRIN 81 MG/1
TABLET, COATED ORAL
Qty: 90 TABLET | Refills: 3 | Status: SHIPPED | OUTPATIENT
Start: 2025-01-03

## 2025-01-03 NOTE — TELEPHONE ENCOUNTER
Per verbal order from Dr. Sharron Payan  Last appt: 11/20/2024     Future Appointments   Date Time Provider Department Center   1/30/2025 10:00 AM [facility] 1 [facility]   2/6/2025 10:40 AM Sharron Payan, MD [facility]       Requested Prescriptions     Signed Prescriptions Disp Refills    ASPIRIN LOW DOSE 81 MG EC tablet 90 tablet 3     Sig: TAKE 1 TABLET BY MOUTH EVERY DAY     Authorizing Provider: SHARRON PAYAN     Ordering User: ROSSY STEWART

## 2025-01-30 ENCOUNTER — ANCILLARY PROCEDURE (OUTPATIENT)
Age: 80
End: 2025-01-30
Payer: MEDICARE

## 2025-01-30 VITALS
HEART RATE: 56 BPM | DIASTOLIC BLOOD PRESSURE: 86 MMHG | SYSTOLIC BLOOD PRESSURE: 174 MMHG | BODY MASS INDEX: 27.9 KG/M2 | WEIGHT: 163.4 LBS | HEIGHT: 64 IN

## 2025-01-30 DIAGNOSIS — I35.0 NONRHEUMATIC AORTIC VALVE STENOSIS: ICD-10-CM

## 2025-01-30 PROCEDURE — 93306 TTE W/DOPPLER COMPLETE: CPT | Performed by: INTERNAL MEDICINE

## 2025-02-04 LAB
ECHO AO ASC DIAM: 4 CM
ECHO AO ASCENDING AORTA INDEX: 2.22 CM/M2
ECHO AO ROOT DIAM: 3.1 CM
ECHO AO ROOT INDEX: 1.72 CM/M2
ECHO AV AREA PEAK VELOCITY: 1.3 CM2
ECHO AV AREA VTI: 1.3 CM2
ECHO AV AREA/BSA PEAK VELOCITY: 0.7 CM2/M2
ECHO AV AREA/BSA VTI: 0.7 CM2/M2
ECHO AV MEAN GRADIENT: 9 MMHG
ECHO AV MEAN VELOCITY: 1.5 M/S
ECHO AV PEAK GRADIENT: 18 MMHG
ECHO AV PEAK VELOCITY: 2.2 M/S
ECHO AV VELOCITY RATIO: 0.32
ECHO AV VTI: 44.6 CM
ECHO BSA: 1.83 M2
ECHO EST RA PRESSURE: 3 MMHG
ECHO LA DIAMETER INDEX: 2.61 CM/M2
ECHO LA DIAMETER: 4.7 CM
ECHO LA TO AORTIC ROOT RATIO: 1.52
ECHO LA VOL A-L A2C: 56 ML (ref 22–52)
ECHO LA VOL A-L A4C: 67 ML (ref 22–52)
ECHO LA VOL BP: 59 ML (ref 22–52)
ECHO LA VOL MOD A2C: 54 ML (ref 22–52)
ECHO LA VOL MOD A4C: 64 ML (ref 22–52)
ECHO LA VOL/BSA BIPLANE: 33 ML/M2 (ref 16–34)
ECHO LA VOLUME AREA LENGTH: 62 ML
ECHO LA VOLUME INDEX A-L A2C: 31 ML/M2 (ref 16–34)
ECHO LA VOLUME INDEX A-L A4C: 37 ML/M2 (ref 16–34)
ECHO LA VOLUME INDEX AREA LENGTH: 34 ML/M2 (ref 16–34)
ECHO LA VOLUME INDEX MOD A2C: 30 ML/M2 (ref 16–34)
ECHO LA VOLUME INDEX MOD A4C: 36 ML/M2 (ref 16–34)
ECHO LV E' LATERAL VELOCITY: 10.75 CM/S
ECHO LV E' SEPTAL VELOCITY: 6.19 CM/S
ECHO LV EDV A2C: 89 ML
ECHO LV EDV A4C: 91 ML
ECHO LV EDV BP: 90 ML (ref 56–104)
ECHO LV EDV INDEX A4C: 51 ML/M2
ECHO LV EDV INDEX BP: 50 ML/M2
ECHO LV EDV NDEX A2C: 49 ML/M2
ECHO LV EJECTION FRACTION A2C: 38 %
ECHO LV EJECTION FRACTION A4C: 41 %
ECHO LV EJECTION FRACTION BIPLANE: 40 % (ref 55–100)
ECHO LV ESV A2C: 56 ML
ECHO LV ESV A4C: 53 ML
ECHO LV ESV BP: 54 ML (ref 19–49)
ECHO LV ESV INDEX A2C: 31 ML/M2
ECHO LV ESV INDEX A4C: 29 ML/M2
ECHO LV ESV INDEX BP: 30 ML/M2
ECHO LV FRACTIONAL SHORTENING: 23 % (ref 28–44)
ECHO LV INTERNAL DIMENSION DIASTOLE INDEX: 3.17 CM/M2
ECHO LV INTERNAL DIMENSION DIASTOLIC: 5.7 CM (ref 3.9–5.3)
ECHO LV INTERNAL DIMENSION SYSTOLIC INDEX: 2.44 CM/M2
ECHO LV INTERNAL DIMENSION SYSTOLIC: 4.4 CM
ECHO LV IVSD: 1.1 CM (ref 0.6–0.9)
ECHO LV MASS 2D: 211.7 G (ref 67–162)
ECHO LV MASS INDEX 2D: 117.6 G/M2 (ref 43–95)
ECHO LV POSTERIOR WALL DIASTOLIC: 0.8 CM (ref 0.6–0.9)
ECHO LV RELATIVE WALL THICKNESS RATIO: 0.28
ECHO LVOT AREA: 4.2 CM2
ECHO LVOT AV VTI INDEX: 0.32
ECHO LVOT DIAM: 2.3 CM
ECHO LVOT MEAN GRADIENT: 1 MMHG
ECHO LVOT PEAK GRADIENT: 2 MMHG
ECHO LVOT PEAK VELOCITY: 0.7 M/S
ECHO LVOT STROKE VOLUME INDEX: 33.2 ML/M2
ECHO LVOT SV: 59.8 ML
ECHO LVOT VTI: 14.4 CM
ECHO MV A VELOCITY: 0.81 M/S
ECHO MV E DECELERATION TIME (DT): 230.8 MS
ECHO MV E VELOCITY: 0.6 M/S
ECHO MV E/A RATIO: 0.74
ECHO MV E/E' LATERAL: 5.58
ECHO MV E/E' RATIO (AVERAGED): 7.64
ECHO MV E/E' SEPTAL: 9.69
ECHO MV REGURGITANT PEAK GRADIENT: 121 MMHG
ECHO MV REGURGITANT PEAK VELOCITY: 5.5 M/S
ECHO PULMONARY ARTERY END DIASTOLIC PRESSURE: 5 MMHG
ECHO PV MAX VELOCITY: 1.3 M/S
ECHO PV PEAK GRADIENT: 7 MMHG
ECHO PV REGURGITANT MAX VELOCITY: 1.1 M/S
ECHO RIGHT VENTRICULAR SYSTOLIC PRESSURE (RVSP): 30 MMHG
ECHO RV BASAL DIMENSION: 3.5 CM
ECHO RV TAPSE: 2.1 CM (ref 1.7–?)
ECHO RVOT PEAK GRADIENT: 1 MMHG
ECHO RVOT PEAK VELOCITY: 0.5 M/S
ECHO TV REGURGITANT MAX VELOCITY: 2.58 M/S
ECHO TV REGURGITANT PEAK GRADIENT: 27 MMHG

## 2025-02-06 ENCOUNTER — OFFICE VISIT (OUTPATIENT)
Age: 80
End: 2025-02-06
Payer: MEDICARE

## 2025-02-06 VITALS
HEIGHT: 64 IN | OXYGEN SATURATION: 99 % | HEART RATE: 88 BPM | WEIGHT: 165.6 LBS | BODY MASS INDEX: 28.27 KG/M2 | SYSTOLIC BLOOD PRESSURE: 166 MMHG | DIASTOLIC BLOOD PRESSURE: 82 MMHG

## 2025-02-06 DIAGNOSIS — I10 ESSENTIAL (PRIMARY) HYPERTENSION: ICD-10-CM

## 2025-02-06 DIAGNOSIS — I44.7 LEFT BUNDLE-BRANCH BLOCK, UNSPECIFIED: ICD-10-CM

## 2025-02-06 DIAGNOSIS — I35.0 NONRHEUMATIC AORTIC VALVE STENOSIS: Primary | ICD-10-CM

## 2025-02-06 DIAGNOSIS — I65.23 BILATERAL CAROTID ARTERY STENOSIS: ICD-10-CM

## 2025-02-06 DIAGNOSIS — E78.2 MIXED HYPERLIPIDEMIA: ICD-10-CM

## 2025-02-06 DIAGNOSIS — I25.10 CORONARY ARTERY DISEASE INVOLVING NATIVE CORONARY ARTERY OF NATIVE HEART WITHOUT ANGINA PECTORIS: ICD-10-CM

## 2025-02-06 PROCEDURE — 3077F SYST BP >= 140 MM HG: CPT | Performed by: INTERNAL MEDICINE

## 2025-02-06 PROCEDURE — 93005 ELECTROCARDIOGRAM TRACING: CPT | Performed by: INTERNAL MEDICINE

## 2025-02-06 PROCEDURE — 93010 ELECTROCARDIOGRAM REPORT: CPT | Performed by: INTERNAL MEDICINE

## 2025-02-06 PROCEDURE — 1126F AMNT PAIN NOTED NONE PRSNT: CPT | Performed by: INTERNAL MEDICINE

## 2025-02-06 PROCEDURE — 1036F TOBACCO NON-USER: CPT | Performed by: INTERNAL MEDICINE

## 2025-02-06 PROCEDURE — 99214 OFFICE O/P EST MOD 30 MIN: CPT | Performed by: INTERNAL MEDICINE

## 2025-02-06 PROCEDURE — G8419 CALC BMI OUT NRM PARAM NOF/U: HCPCS | Performed by: INTERNAL MEDICINE

## 2025-02-06 PROCEDURE — 3080F DIAST BP >= 90 MM HG: CPT | Performed by: INTERNAL MEDICINE

## 2025-02-06 PROCEDURE — 1159F MED LIST DOCD IN RCRD: CPT | Performed by: INTERNAL MEDICINE

## 2025-02-06 PROCEDURE — 1090F PRES/ABSN URINE INCON ASSESS: CPT | Performed by: INTERNAL MEDICINE

## 2025-02-06 PROCEDURE — 1123F ACP DISCUSS/DSCN MKR DOCD: CPT | Performed by: INTERNAL MEDICINE

## 2025-02-06 PROCEDURE — G8400 PT W/DXA NO RESULTS DOC: HCPCS | Performed by: INTERNAL MEDICINE

## 2025-02-06 PROCEDURE — G8427 DOCREV CUR MEDS BY ELIG CLIN: HCPCS | Performed by: INTERNAL MEDICINE

## 2025-02-06 ASSESSMENT — PATIENT HEALTH QUESTIONNAIRE - PHQ9
1. LITTLE INTEREST OR PLEASURE IN DOING THINGS: NOT AT ALL
SUM OF ALL RESPONSES TO PHQ QUESTIONS 1-9: 0
SUM OF ALL RESPONSES TO PHQ9 QUESTIONS 1 & 2: 0
SUM OF ALL RESPONSES TO PHQ QUESTIONS 1-9: 0
2. FEELING DOWN, DEPRESSED OR HOPELESS: NOT AT ALL
SUM OF ALL RESPONSES TO PHQ QUESTIONS 1-9: 0
SUM OF ALL RESPONSES TO PHQ QUESTIONS 1-9: 0

## 2025-02-06 NOTE — PROGRESS NOTES
Patient: Lizz Paul  : 1945    Primary Cardiologist: Rae Bazzi MD  EP Cardiologist:  PCP: Jose Mei MD    Today's Date: 2025    Last saw Idalmis CARY  Her LDL is 98 and still too high for her hx of CAD  LDL goal is <70  I would recommend she stop zetia and begin nexlizet to lower her cholesterol  Continue taking atorvastatin  Unable to afford nexlizet    ASSESSMENT AND PLAN:       ICD-10-CM    1. Nonrheumatic aortic valve stenosis  I35.0 EKG 12 Lead      2. Coronary artery disease involving native coronary artery of native heart without angina pectoris  I25.10 EKG 12 Lead      3. Mixed hyperlipidemia  E78.2 EKG 12 Lead      4. Essential (primary) hypertension  I10 EKG 12 Lead      5. Bilateral carotid artery stenosis  I65.23 EKG 12 Lead      6. Left bundle-branch block, unspecified  I44.7 EKG 12 Lead         1.  Hx of NSTEMI in 2018/CAD - troponin peaked at 2.4, cardiac cath revealed significant RCA disease with collaterals and other non-obstructive CAD, no PCI needed, managing CAD medically   -continue ASA 81mg daily, Toprol XL, statin + zetia   -Has Rx for NTG SL tabs PRN chest pain      2.  HFmrEF  LVEF 15-20% by initial TTE   Echo 2023 showed EF 45%, echo  showed EF 45-50%  Cannot afford entresto so will begin losartan 50mg daily after she runs out of entresto  Continue Toprol XL   Likely not able to afford jardiance, may consider spironolactone at next visit   Declined ICD in the past, found the idea extremely stressful and even caused tears discussing it  Did not discuss DNR status today      3.  Dyslipidemia - on atorvastatin 80mg daily and zetia, LDL goal < 70  Unable to afford rosuvastatin  Previously discussed PCSK9 given CAD, carotid disease, LDL not at goal  - she is not receptive to this at this time.    Bempoic acid too expensive      4.  HTN   BP well controlled, continue toprol XL and transition entresto to losartan as above      5.  DM Type 2 -

## 2025-02-13 RX ORDER — EZETIMIBE 10 MG/1
10 TABLET ORAL DAILY
Qty: 90 TABLET | Refills: 3 | Status: SHIPPED | OUTPATIENT
Start: 2025-02-13

## 2025-05-05 ENCOUNTER — TELEPHONE (OUTPATIENT)
Age: 80
End: 2025-05-05

## 2025-05-05 RX ORDER — CLOPIDOGREL BISULFATE 75 MG/1
75 TABLET ORAL DAILY
Qty: 90 TABLET | Refills: 2 | Status: SHIPPED | OUTPATIENT
Start: 2025-05-05

## 2025-05-05 NOTE — TELEPHONE ENCOUNTER
Per verbal order from Dr. Rae Payan  Last appt: 2/6/2025     Future Appointments   Date Time Provider Department Center   2/9/2026 10:00 AM [facility]   2/9/2026 11:20 AM Chon Kaur MD [facility]       Requested Prescriptions     Signed Prescriptions Disp Refills    clopidogrel (PLAVIX) 75 MG tablet 90 tablet 2     Sig: TAKE 1 TABLET BY MOUTH EVERY DAY     Authorizing Provider: RAE PAYAN     Ordering User: ROSSY STEWART

## 2025-05-12 NOTE — TELEPHONE ENCOUNTER
Spoke with patient after ID x2  Patient was to be scheduled for echo/fup with Dr. Bazzi for Feb 2026, during check out PSR selected Dr. Patterson and did not indicate reason for [place] visit. Contacted patient to clarify if she was wanting to switch providers. Patient did not ask to switch providers and was scheduled incorrectly.  Patient feb 2026 fup RS back with Rose Mary.

## 2025-05-13 DIAGNOSIS — E78.5 HYPERLIPIDEMIA, UNSPECIFIED: ICD-10-CM

## 2025-05-13 RX ORDER — ATORVASTATIN CALCIUM 80 MG/1
80 TABLET, FILM COATED ORAL NIGHTLY
Qty: 90 TABLET | Refills: 3 | Status: SHIPPED | OUTPATIENT
Start: 2025-05-13

## 2025-05-13 NOTE — TELEPHONE ENCOUNTER
Per verbal order from Dr. Sharron Payan  Last appt: 2/6/2025     Future Appointments   Date Time Provider Department Center   2/9/2026 10:00 AM [facility]   2/12/2026  9:00 AM Sharron Payan, MD [facility]       Requested Prescriptions     Signed Prescriptions Disp Refills    atorvastatin (LIPITOR) 80 MG tablet 90 tablet 3     Sig: TAKE 1 TABLET BY MOUTH EVERY DAY AT NIGHT     Authorizing Provider: SHARRON PAYAN     Ordering User: ROSSY STEWART

## 2025-06-25 NOTE — TELEPHONE ENCOUNTER
Patient called wanting prescription for nitroGLYCERIN (NITROSTAT) 0.4 MG SL tablet to be sent over to [facility] on [address] as they stated the RX  in     Pt ph# [phone]  [facility]# [phone]  [facility] fax# [phone]

## 2025-06-26 RX ORDER — POTASSIUM CHLORIDE 1500 MG/1
TABLET, EXTENDED RELEASE ORAL
Qty: 45 TABLET | Refills: 1 | Status: SHIPPED | OUTPATIENT
Start: 2025-06-26

## 2025-06-26 RX ORDER — LOSARTAN POTASSIUM 50 MG/1
50 TABLET ORAL DAILY
Qty: 90 TABLET | Refills: 3 | Status: SHIPPED | OUTPATIENT
Start: 2025-06-26

## 2025-06-26 RX ORDER — NITROGLYCERIN 0.4 MG/1
0.4 TABLET SUBLINGUAL EVERY 5 MIN PRN
Qty: 25 TABLET | Refills: 3 | Status: SHIPPED | OUTPATIENT
Start: 2025-06-26

## 2025-06-26 NOTE — TELEPHONE ENCOUNTER
Per verbal order from Dr. Sharron Payan  Last appt: 2/6/2025     Future Appointments   Date Time Provider Department Center   2/9/2026 10:00 AM [facility]   2/12/2026  9:00 AM Sharron Payan, MD [facility]       Requested Prescriptions     Signed Prescriptions Disp Refills    potassium chloride (KLOR-CON M) 20 MEQ extended release tablet 45 tablet 1     Sig: TAKE 1/2 (HALF) A TABLET BY MOUTH EVERY DAY     Authorizing Provider: SHARRON PAYAN     Ordering User: ROSSY STEWART

## 2025-06-26 NOTE — TELEPHONE ENCOUNTER
Per verbal order from Dr. Sharron Payan  Last appt: 2/6/2025     Future Appointments   Date Time Provider Department Center   2/9/2026 10:00 AM [facility]   2/12/2026  9:00 AM Sharron Payan, MD [facility]       Requested Prescriptions     Signed Prescriptions Disp Refills    losartan (COZAAR) 50 MG tablet 90 tablet 3     Sig: TAKE 1 TABLET BY MOUTH EVERY DAY     Authorizing Provider: SHARRON PAYAN     Ordering User: ROSSY STEWART    nitroGLYCERIN (NITROSTAT) 0.4 MG SL tablet 25 tablet 3     Sig: Place 1 tablet under the tongue every 5 minutes as needed for Chest pain up to max of 3 total doses. If no relief after 1 dose, call 911.     Authorizing Provider: SHARRON PAYAN     Ordering User: ROSSY STEWART

## 2025-07-31 ENCOUNTER — TELEPHONE (OUTPATIENT)
Age: 80
End: 2025-07-31

## 2025-07-31 RX ORDER — METOPROLOL SUCCINATE 25 MG/1
25 TABLET, EXTENDED RELEASE ORAL EVERY EVENING
Qty: 90 TABLET | Refills: 2 | Status: SHIPPED | OUTPATIENT
Start: 2025-07-31

## 2025-07-31 NOTE — TELEPHONE ENCOUNTER
Patient called to get a refill on metoprolol succinate 25mg, patient completely out?    Pharmacy # [phone]

## 2025-08-06 RX ORDER — METOPROLOL SUCCINATE 25 MG/1
25 TABLET, EXTENDED RELEASE ORAL EVERY EVENING
Qty: 90 TABLET | Refills: 1 | OUTPATIENT
Start: 2025-08-06

## (undated) DEVICE — SOLUTION IRRIG 1000ML H2O STRL BLT

## (undated) DEVICE — GOWN,SIRUS,NONRNF,SETINSLV,2XL,18/CS: Brand: MEDLINE

## (undated) DEVICE — NEEDLE HYPO 25GA L1.5IN BVL ORIENTED ECLIPSE

## (undated) DEVICE — SUTURE VCRL SZ 3-0 L27IN ABSRB UD L26MM SH 1/2 CIR J416H

## (undated) DEVICE — Z DISCONTINUED USE 2425483 (LOW STOCK PER MEDLINE) TAPE UMB L18IN DIA1/8IN WHT COT NONABSORBABLE W/O NDL FOR

## (undated) DEVICE — Device

## (undated) DEVICE — CAROTID ARTERY SHUNT KIT,RADIOPAQUE LINE, STRAIGHT: Brand: ARGYLE

## (undated) DEVICE — MAGNETIC DRAPE: Brand: DEVON

## (undated) DEVICE — MASTISOL ADHESIVE LIQ 2/3ML

## (undated) DEVICE — HANDLE LT SNAP ON ULT DURABLE LENS FOR TRUMPF ALC DISPOSABLE

## (undated) DEVICE — GAUZE SPONGES,12 PLY: Brand: CURITY

## (undated) DEVICE — SUT PROL 6-0 18IN BV1 DA BLU --

## (undated) DEVICE — STERILE POLYISOPRENE POWDER-FREE SURGICAL GLOVES WITH EMOLLIENT COATING: Brand: PROTEXIS

## (undated) DEVICE — 3M™ MEDIPORE™ H SOFT CLOTH SURGICAL TAPE 2864, 4 INCH X 10 YARD (10CM X 9,14M), 12 ROLLS/CASE: Brand: 3M™ MEDIPORE™

## (undated) DEVICE — SPONGE HEMOSTAT CELLULS 4X8IN -- SURGICEL

## (undated) DEVICE — APPLICATOR BNDG 1MM ADH PREMIERPRO EXOFIN

## (undated) DEVICE — DEVON™ KNEE AND BODY STRAP 60" X 3" (1.5 M X 7.6 CM): Brand: DEVON

## (undated) DEVICE — I.V. DRAIN SPONGES: Brand: DERMACEA

## (undated) DEVICE — VASCULAR-RICHMOND-LF: Brand: MEDLINE INDUSTRIES, INC.

## (undated) DEVICE — SUTURE MCRYL SZ 4-0 L27IN ABSRB UD L19MM PS-2 1/2 CIR PRIM Y426H

## (undated) DEVICE — SUTURE PROL SZ 5-0 L30IN NONABSORBABLE BLU L13MM RB-2 1/2 8710H

## (undated) DEVICE — 1200 GUARD II KIT W/5MM TUBE W/O VAC TUBE: Brand: GUARDIAN

## (undated) DEVICE — WIPE 400300 MEROCEL 20PK INSTRUMENT: Brand: MEROCEL®

## (undated) DEVICE — SUT PROL 6-0 24IN BV1 DA BLU --

## (undated) DEVICE — CATH URETH INTMIT ROB 16FR FUN -- CONVERT TO ITEM 179520

## (undated) DEVICE — REM POLYHESIVE ADULT PATIENT RETURN ELECTRODE: Brand: VALLEYLAB

## (undated) DEVICE — DRAIN SURG W7MMXL20CM SIL FULL PERF HUBLESS FLAT RADPQ STRP

## (undated) DEVICE — AGENT HEMSTAT W4XL4IN OXIDIZED REGENERATED CELOS ABSRB SFT

## (undated) DEVICE — TRAY PREP DRY W/ PREM GLV 2 APPL 6 SPNG 2 UNDPD 1 OVERWRAP

## (undated) DEVICE — KENDALL SCD EXPRESS SLEEVES, KNEE LENGTH, MEDIUM: Brand: KENDALL SCD

## (undated) DEVICE — SUT ETHLN 2-0 18IN FS BLK --

## (undated) DEVICE — SOLUTION IV 500ML 0.9% SOD CHL FLX CONT

## (undated) DEVICE — INFECTION CONTROL KIT SYS